# Patient Record
Sex: FEMALE | Race: BLACK OR AFRICAN AMERICAN | Employment: UNEMPLOYED | URBAN - METROPOLITAN AREA
[De-identification: names, ages, dates, MRNs, and addresses within clinical notes are randomized per-mention and may not be internally consistent; named-entity substitution may affect disease eponyms.]

---

## 2017-10-29 ENCOUNTER — HOSPITAL ENCOUNTER (EMERGENCY)
Facility: CLINIC | Age: 37
Discharge: HOME | End: 2017-10-29
Attending: EMERGENCY MEDICINE | Admitting: EMERGENCY MEDICINE
Payer: COMMERCIAL

## 2017-10-29 VITALS
OXYGEN SATURATION: 99 % | RESPIRATION RATE: 16 BRPM | SYSTOLIC BLOOD PRESSURE: 119 MMHG | TEMPERATURE: 97.6 F | DIASTOLIC BLOOD PRESSURE: 81 MMHG

## 2017-10-29 DIAGNOSIS — B34.9 ACUTE VIRAL SYNDROME: ICD-10-CM

## 2017-10-29 DIAGNOSIS — R10.9 ABDOMINAL CRAMPING: ICD-10-CM

## 2017-10-29 DIAGNOSIS — R11.0 NAUSEA: ICD-10-CM

## 2017-10-29 LAB
ALBUMIN UR-MCNC: NEGATIVE MG/DL
ANION GAP SERPL CALCULATED.3IONS-SCNC: 6 MMOL/L (ref 3–14)
APPEARANCE UR: CLEAR
BACTERIA #/AREA URNS HPF: ABNORMAL /HPF
BASOPHILS # BLD AUTO: 0 10E9/L (ref 0–0.2)
BASOPHILS NFR BLD AUTO: 0.3 %
BILIRUB UR QL STRIP: NEGATIVE
BUN SERPL-MCNC: 6 MG/DL (ref 7–30)
CALCIUM SERPL-MCNC: 8.6 MG/DL (ref 8.5–10.1)
CHLORIDE SERPL-SCNC: 107 MMOL/L (ref 94–109)
CO2 SERPL-SCNC: 26 MMOL/L (ref 20–32)
COLOR UR AUTO: YELLOW
CREAT SERPL-MCNC: 0.69 MG/DL (ref 0.52–1.04)
DEPRECATED S PYO AG THROAT QL EIA: NORMAL
DIFFERENTIAL METHOD BLD: NORMAL
EOSINOPHIL # BLD AUTO: 0.1 10E9/L (ref 0–0.7)
EOSINOPHIL NFR BLD AUTO: 1 %
ERYTHROCYTE [DISTWIDTH] IN BLOOD BY AUTOMATED COUNT: 13.2 % (ref 10–15)
GFR SERPL CREATININE-BSD FRML MDRD: >90 ML/MIN/1.7M2
GLUCOSE SERPL-MCNC: 91 MG/DL (ref 70–99)
GLUCOSE UR STRIP-MCNC: NEGATIVE MG/DL
HCG UR QL: NEGATIVE
HCT VFR BLD AUTO: 37.3 % (ref 35–47)
HGB BLD-MCNC: 12.1 G/DL (ref 11.7–15.7)
HGB UR QL STRIP: NEGATIVE
IMM GRANULOCYTES # BLD: 0 10E9/L (ref 0–0.4)
IMM GRANULOCYTES NFR BLD: 0.3 %
KETONES UR STRIP-MCNC: NEGATIVE MG/DL
LEUKOCYTE ESTERASE UR QL STRIP: NEGATIVE
LYMPHOCYTES # BLD AUTO: 2.2 10E9/L (ref 0.8–5.3)
LYMPHOCYTES NFR BLD AUTO: 32.1 %
MCH RBC QN AUTO: 28.6 PG (ref 26.5–33)
MCHC RBC AUTO-ENTMCNC: 32.4 G/DL (ref 31.5–36.5)
MCV RBC AUTO: 88 FL (ref 78–100)
MONOCYTES # BLD AUTO: 0.6 10E9/L (ref 0–1.3)
MONOCYTES NFR BLD AUTO: 8.2 %
MUCOUS THREADS #/AREA URNS LPF: PRESENT /LPF
NEUTROPHILS # BLD AUTO: 4 10E9/L (ref 1.6–8.3)
NEUTROPHILS NFR BLD AUTO: 58.1 %
NITRATE UR QL: NEGATIVE
NRBC # BLD AUTO: 0 10*3/UL
NRBC BLD AUTO-RTO: 0 /100
PH UR STRIP: 6 PH (ref 5–7)
PLATELET # BLD AUTO: 442 10E9/L (ref 150–450)
POTASSIUM SERPL-SCNC: 3.7 MMOL/L (ref 3.4–5.3)
RBC # BLD AUTO: 4.23 10E12/L (ref 3.8–5.2)
RBC #/AREA URNS AUTO: 1 /HPF (ref 0–2)
SODIUM SERPL-SCNC: 139 MMOL/L (ref 133–144)
SOURCE: ABNORMAL
SP GR UR STRIP: 1.01 (ref 1–1.03)
SPECIMEN SOURCE: NORMAL
SQUAMOUS #/AREA URNS AUTO: 1 /HPF (ref 0–1)
UROBILINOGEN UR STRIP-MCNC: 0 MG/DL (ref 0–2)
WBC # BLD AUTO: 6.9 10E9/L (ref 4–11)
WBC #/AREA URNS AUTO: <1 /HPF (ref 0–2)

## 2017-10-29 PROCEDURE — 87880 STREP A ASSAY W/OPTIC: CPT | Performed by: EMERGENCY MEDICINE

## 2017-10-29 PROCEDURE — 81025 URINE PREGNANCY TEST: CPT | Performed by: EMERGENCY MEDICINE

## 2017-10-29 PROCEDURE — 80048 BASIC METABOLIC PNL TOTAL CA: CPT | Performed by: EMERGENCY MEDICINE

## 2017-10-29 PROCEDURE — 81001 URINALYSIS AUTO W/SCOPE: CPT | Performed by: EMERGENCY MEDICINE

## 2017-10-29 PROCEDURE — 87081 CULTURE SCREEN ONLY: CPT | Performed by: EMERGENCY MEDICINE

## 2017-10-29 PROCEDURE — 85025 COMPLETE CBC W/AUTO DIFF WBC: CPT | Performed by: EMERGENCY MEDICINE

## 2017-10-29 PROCEDURE — 99283 EMERGENCY DEPT VISIT LOW MDM: CPT

## 2017-10-29 PROCEDURE — 25000132 ZZH RX MED GY IP 250 OP 250 PS 637: Performed by: EMERGENCY MEDICINE

## 2017-10-29 RX ORDER — ACETAMINOPHEN 500 MG
1000 TABLET ORAL ONCE
Status: COMPLETED | OUTPATIENT
Start: 2017-10-29 | End: 2017-10-29

## 2017-10-29 RX ORDER — ONDANSETRON 4 MG/1
4 TABLET, ORALLY DISINTEGRATING ORAL EVERY 8 HOURS PRN
Qty: 10 TABLET | Refills: 0 | Status: SHIPPED | OUTPATIENT
Start: 2017-10-29 | End: 2017-11-01

## 2017-10-29 RX ADMIN — ACETAMINOPHEN 1000 MG: 500 TABLET, FILM COATED ORAL at 11:59

## 2017-10-29 ASSESSMENT — ENCOUNTER SYMPTOMS
DYSURIA: 0
SORE THROAT: 1
CHILLS: 0
COUGH: 1
DIARRHEA: 0
HEMATURIA: 0
FREQUENCY: 0
ABDOMINAL PAIN: 1
FATIGUE: 0

## 2017-10-29 NOTE — DISCHARGE INSTRUCTIONS
Discharge Instructions  Abdominal Pain    Abdominal pain (belly pain) can be caused by many things. Your evaluation today does not show the exact cause for your pain. Your provider today has decided that it is unlikely your pain is due to a life threatening problem, or a problem requiring surgery or hospital admission. Sometimes those problems cannot be found right away, so it is very important that you follow up as directed.  Sometimes only the changes which occur over time allow the cause of your pain to be found.    Generally, every Emergency Department visit should have a follow-up clinic visit with either a primary or a specialty clinic/provider. Please follow-up as instructed by your emergency provider today. With abdominal pain, we often recommend very close follow-up, such as the following day.    ADULTS:  Return to the Emergency Department right away if:      You get an oral temperature above 102oF or as directed by your provider.    You have blood in your stools. This may be bright red or appear as black, tarry stools.      You keep vomiting (throwing up) or cannot drink liquids.    You see blood when you vomit.     You cannot have a bowel movement or you cannot pass gas.    Your stomach gets bloated or bigger.    Your skin or the whites of your eyes look yellow.    You faint.    You have bloody, frequent or painful urination (peeing).    You have new symptoms or anything that worries you.    CHILDREN:  Return to the Emergency Department right away if your child has any of the above-listed symptoms or the following:      Pushes your hand away or screams/cries when his/her belly is touched.    You notice your child is very fussy or weak.    Your child is very tired and is too tired to eat or drink.    Your child is dehydrated.  Signs of dehydration can be:  o Significant change in the amount of wet diapers/urine.  o Your infant or child starts to have dry mouth and lips, or no saliva (spit) or  tears.    PREGNANT WOMEN:  Return to the Emergency Department right away if you have any of the above-listed symptoms or the following:      You have bleeding, leaking fluid or passing tissue from the vagina.    You have worse pain or cramping, or pain in your shoulder or back.    You have vomiting that will not stop.    You have a temperature of 100oF or more.    Your baby is not moving as much as usual.    You faint.    You get a bad headache with or without eye problems and abdominal pain.    You have a seizure.    You have unusual discharge from your vagina and abdominal pain.    Abdominal pain is pretty common during pregnancy.  Your pain may or may not be related to your pregnancy. You should follow-up closely with your OB provider so they can evaluate you and your baby.  Until you follow-up with your regular provider, do the following:       Avoid sex and do not put anything in your vagina.    Drink clear fluids.    Only take medications approved by your provider.    MORE INFORMATION:    Appendicitis:  A possible cause of abdominal pain in any person who still has their appendix is acute appendicitis. Appendicitis is often hard to diagnose.  Testing does not always rule out early appendicitis or other causes of abdominal pain. Close follow-up with your provider and re-evaluations may be needed to figure out the reason for your abdominal pain.    Follow-up:  It is very important that you make an appointment with your clinic and go to the appointment.  If you do not follow-up with your primary provider, it may result in missing an important development which could result in permanent injury or disability and/or lasting pain.  If there is any problem keeping your appointment, call your provider or return to the Emergency Department.    Medications:  Take your medications as directed by your provider today.  Before using over-the-counter medications, ask your provider and make sure to take the medications as  "directed.  If you have any questions about medications, ask your provider.    Diet:  Resume your normal diet as much as possible, but do not eat fried, fatty or spicy foods while you have pain.  Do not drink alcohol or have caffeine.  Do not smoke tobacco.    Probiotics: If you have been given an antibiotic, you may want to also take a probiotic pill or eat yogurt with live cultures. Probiotics have \"good bacteria\" to help your intestines stay healthy. Studies have shown that probiotics help prevent diarrhea (loose stools) and other intestine problems (including C. diff infection) when you take antibiotics. You can buy these without a prescription in the pharmacy section of the store.     If you were given a prescription for medicine here today, be sure to read all of the information (including the package insert) that comes with your prescription.  This will include important information about the medicine, its side effects, and any warnings that you need to know about.  The pharmacist who fills the prescription can provide more information and answer questions you may have about the medicine.  If you have questions or concerns that the pharmacist cannot address, please call or return to the Emergency Department.       Remember that you can always come back to the Emergency Department if you are not able to see your regular provider in the amount of time listed above, if you get any new symptoms, or if there is anything that worries you.    Discharge Instructions  Sore Throat  You were seen today for a sore throat.  Most (>80%) sore throats are caused by a virus. Antibiotics do not help with viral infections, but you can fight off the virus on your own.  In this case, your sore throat would be treated with medications for your pain and fever.    Strep throat is a kind of sore throat caused by Group A streptococcus bacteria.  This type of sore throat is treated with antibiotics.  If you had a rapid test done today " for strep throat and it did not show infection, we may do a culture. If the culture shows you have strep throat, we will call you and get you a prescription for antibiotics.  Generally, every Emergency Department visit should have a follow-up clinic visit with either a primary or a specialty clinic/provider. Please follow-up as instructed by your emergency provider today.  Return to the Emergency Department if:    If you have difficulty breathing.    If you are drooling because you are unable to swallow.    You become dehydrated due to difficulty drinking. Signs of dehydration include weakness, dry mouth, and urinating less than 3 times per day.    If you develop swelling of the neck or tongue.    If you develop a high fever with either severe or unusual headache or stiff neck.    Treatment:      Pain relief -- Non-prescription pain medications, such as Tylenol  (acetaminophen) or Motrin , Advil  (ibuprofen) are usually recommended for pain.  Do not use a medicine that you are allergic to, or if your provider has told you not to use it.    Soft or liquid diet. Concentrate on liquids to keep yourself hydrated. Cold liquids (popsicles, ice cream, etc.) may feel good on your throat.  If you were given a prescription for medicine here today, be sure to read all of the information (including the package insert) that comes with your prescription.  This will include important information about the medicine, its side effects, and any warnings that you need to know about.  The pharmacist who fills the prescription can provide more information and answer questions you may have about the medicine.  If you have questions or concerns that the pharmacist cannot address, please call or return to the Emergency Department.   Remember that you can always come back to the Emergency Department if you are not able to see your regular provider in the amount of time listed above, if you get any new symptoms, or if there is anything that  worries you.

## 2017-10-29 NOTE — ED AVS SNAPSHOT
[facility]    [address] [address]    Phone:  [phone]    Fax:  [phone]                                       Simran Fish   MRN: [id number]    Department:  [facility]   Date of Visit:  10/29/2017           Patient Information     Date Of Birth          1980        Your diagnoses for this visit were:     Acute viral syndrome     Abdominal cramping     Nausea        You were seen by Kaylee Young MD.      Follow-up Information     Follow up with [facility], [facility].    Specialty:  Internal Medicine    Why:  3-5 days as needed    Contact information:    [address] [place] [address]  [phone]          Follow up with [facility].    Specialty:  EMERGENCY MEDICINE    Why:  As needed, If symptoms worsen    Contact information:    [address] [place] [address]  [phone]        Discharge Instructions       Discharge Instructions  Abdominal Pain    Abdominal pain (belly pain) can be caused by many things. Your evaluation today does not show the exact cause for your pain. Your provider today has decided that it is unlikely your pain is due to a life threatening problem, or a problem requiring surgery or hospital admission. Sometimes those problems cannot be found right away, so it is very important that you follow up as directed.  Sometimes only the changes which occur over time allow the cause of your pain to be found.    Generally, every Emergency Department visit should have a follow-up clinic visit with either a primary or a specialty clinic/provider. Please follow-up as instructed by your emergency provider today. With abdominal pain, we often recommend very close follow-up, such as the following day.    ADULTS:  Return to the Emergency Department right away if:      You get an oral temperature above 102oF or as  directed by your provider.    You have blood in your stools. This may be bright red or appear as black, tarry stools.      You keep vomiting (throwing up) or cannot drink liquids.    You see blood when you vomit.     You cannot have a bowel movement or you cannot pass gas.    Your stomach gets bloated or bigger.    Your skin or the whites of your eyes look yellow.    You faint.    You have bloody, frequent or painful urination (peeing).    You have new symptoms or anything that worries you.    CHILDREN:  Return to the Emergency Department right away if your child has any of the above-listed symptoms or the following:      Pushes your hand away or screams/cries when his/her belly is touched.    You notice your child is very fussy or weak.    Your child is very tired and is too tired to eat or drink.    Your child is dehydrated.  Signs of dehydration can be:  o Significant change in the amount of wet diapers/urine.  o Your infant or child starts to have dry mouth and lips, or no saliva (spit) or tears.    PREGNANT WOMEN:  Return to the Emergency Department right away if you have any of the above-listed symptoms or the following:      You have bleeding, leaking fluid or passing tissue from the vagina.    You have worse pain or cramping, or pain in your shoulder or back.    You have vomiting that will not stop.    You have a temperature of 100oF or more.    Your baby is not moving as much as usual.    You faint.    You get a bad headache with or without eye problems and abdominal pain.    You have a seizure.    You have unusual discharge from your vagina and abdominal pain.    Abdominal pain is pretty common during pregnancy.  Your pain may or may not be related to your pregnancy. You should follow-up closely with your OB provider so they can evaluate you and your baby.  Until you follow-up with your regular provider, do the following:       Avoid sex and do not put anything in your vagina.    Drink clear  "fluids.    Only take medications approved by your provider.    MORE INFORMATION:    Appendicitis:  A possible cause of abdominal pain in any person who still has their appendix is acute appendicitis. Appendicitis is often hard to diagnose.  Testing does not always rule out early appendicitis or other causes of abdominal pain. Close follow-up with your provider and re-evaluations may be needed to figure out the reason for your abdominal pain.    Follow-up:  It is very important that you make an appointment with your clinic and go to the appointment.  If you do not follow-up with your primary provider, it may result in missing an important development which could result in permanent injury or disability and/or lasting pain.  If there is any problem keeping your appointment, call your provider or return to the Emergency Department.    Medications:  Take your medications as directed by your provider today.  Before using over-the-counter medications, ask your provider and make sure to take the medications as directed.  If you have any questions about medications, ask your provider.    Diet:  Resume your normal diet as much as possible, but do not eat fried, fatty or spicy foods while you have pain.  Do not drink alcohol or have caffeine.  Do not smoke tobacco.    Probiotics: If you have been given an antibiotic, you may want to also take a probiotic pill or eat yogurt with live cultures. Probiotics have \"good bacteria\" to help your intestines stay healthy. Studies have shown that probiotics help prevent diarrhea (loose stools) and other intestine problems (including C. diff infection) when you take antibiotics. You can buy these without a prescription in the pharmacy section of the store.     If you were given a prescription for medicine here today, be sure to read all of the information (including the package insert) that comes with your prescription.  This will include important information about the medicine, its side " effects, and any warnings that you need to know about.  The pharmacist who fills the prescription can provide more information and answer questions you may have about the medicine.  If you have questions or concerns that the pharmacist cannot address, please call or return to the Emergency Department.       Remember that you can always come back to the Emergency Department if you are not able to see your regular provider in the amount of time listed above, if you get any new symptoms, or if there is anything that worries you.    Discharge Instructions  Sore Throat  You were seen today for a sore throat.  Most (>80%) sore throats are caused by a virus. Antibiotics do not help with viral infections, but you can fight off the virus on your own.  In this case, your sore throat would be treated with medications for your pain and fever.    Strep throat is a kind of sore throat caused by Group A streptococcus bacteria.  This type of sore throat is treated with antibiotics.  If you had a rapid test done today for strep throat and it did not show infection, we may do a culture. If the culture shows you have strep throat, we will call you and get you a prescription for antibiotics.  Generally, every Emergency Department visit should have a follow-up clinic visit with either a primary or a specialty clinic/provider. Please follow-up as instructed by your emergency provider today.  Return to the Emergency Department if:    If you have difficulty breathing.    If you are drooling because you are unable to swallow.    You become dehydrated due to difficulty drinking. Signs of dehydration include weakness, dry mouth, and urinating less than 3 times per day.    If you develop swelling of the neck or tongue.    If you develop a high fever with either severe or unusual headache or stiff neck.    Treatment:      Pain relief -- Non-prescription pain medications, such as Tylenol  (acetaminophen) or Motrin , Advil  (ibuprofen) are  usually recommended for pain.  Do not use a medicine that you are allergic to, or if your provider has told you not to use it.    Soft or liquid diet. Concentrate on liquids to keep yourself hydrated. Cold liquids (popsicles, ice cream, etc.) may feel good on your throat.  If you were given a prescription for medicine here today, be sure to read all of the information (including the package insert) that comes with your prescription.  This will include important information about the medicine, its side effects, and any warnings that you need to know about.  The pharmacist who fills the prescription can provide more information and answer questions you may have about the medicine.  If you have questions or concerns that the pharmacist cannot address, please call or return to the Emergency Department.   Remember that you can always come back to the Emergency Department if you are not able to see your regular provider in the amount of time listed above, if you get any new symptoms, or if there is anything that worries you.      24 Hour Appointment Hotline       To make an appointment at any [facility], call [phone] ([phone]). If you don't have a family doctor or clinic, we will help you find one. [facility] clinics are conveniently located to serve the needs of you and your family.             Review of your medicines      START taking        Dose / Directions Last dose taken    ondansetron 4 MG ODT tab   Commonly known as:  ZOFRAN ODT   Dose:  4 mg   Quantity:  10 tablet        Take 1 tablet (4 mg) by mouth every 8 hours as needed for nausea   Refills:  0          Our records show that you are taking the medicines listed below. If these are incorrect, please call your family doctor or clinic.        Dose / Directions Last dose taken    albuterol 108 (90 BASE) MCG/ACT Inhaler   Commonly known as:  PROAIR HFA   Dose:  1-2 puff   Quantity:  1 Inhaler        Inhale 1-2 puffs into the lungs every 4 hours as  needed for shortness of breath / dyspnea   Refills:  11        budesonide-formoterol 160-4.5 MCG/ACT Inhaler   Commonly known as:  SYMBICORT   Dose:  2 puff   Quantity:  1 Inhaler        Inhale 2 puffs into the lungs 2 times daily   Refills:  8        EXCEDRIN PO        Take by mouth as needed   Refills:  0        SUMAtriptan 100 MG tablet   Commonly known as:  IMITREX   Dose:  100 mg   Quantity:  9 tablet        Take 1 tablet (100 mg) by mouth at onset of headache for migraine May repeat in 2 hours if needed: max 2/day; average number of headaches monthly 2   Refills:  1                Prescriptions were sent or printed at these locations (1 Prescription)                   Other Prescriptions                Printed at Department/Unit printer (1 of 1)         ondansetron (ZOFRAN ODT) 4 MG ODT tab                Procedures and tests performed during your visit     Basic metabolic panel    Beta strep group A culture    CBC with platelets differential    HCG qualitative urine    Peripheral IV: Standard    Rapid strep screen    UA with Microscopic      Orders Needing Specimen Collection     None      Pending Results     Date and Time Order Name Status Description    10/29/2017 1141 Beta strep group A culture In process             Pending Culture Results     Date and Time Order Name Status Description    10/29/2017 1141 Beta strep group A culture In process             Pending Results Instructions     If you had any lab results that were not finalized at the time of your Discharge, you can call the ED Lab Result RN at [phone]. You will be contacted by this team for any positive Lab results or changes in treatment. The nurses are available 7 days a week from 10A to 6:30P.  You can leave a message 24 hours per day and they will return your call.        Test Results From Your Hospital Stay        10/29/2017 12:30 PM      Component Results     Component Value Ref Range & Units Status    Color Urine Yellow  Final     Appearance Urine Clear  Final    Glucose Urine Negative NEG^Negative mg/dL Final    Bilirubin Urine Negative NEG^Negative Final    Ketones Urine Negative NEG^Negative mg/dL Final    Specific Gravity Urine 1.015 1.003 - 1.035 Final    Blood Urine Negative NEG^Negative Final    pH Urine 6.0 5.0 - 7.0 pH Final    Protein Albumin Urine Negative NEG^Negative mg/dL Final    Urobilinogen mg/dL 0.0 0.0 - 2.0 mg/dL Final    Nitrite Urine Negative NEG^Negative Final    Leukocyte Esterase Urine Negative NEG^Negative Final    Source Midstream Urine  Final    WBC Urine <1 0 - 2 /HPF Final    RBC Urine 1 0 - 2 /HPF Final    Bacteria Urine Few (A) NEG^Negative /HPF Final    Squamous Epithelial /HPF Urine 1 0 - 1 /HPF Final    Mucous Urine Present (A) NEG^Negative /LPF Final         10/29/2017 12:28 PM      Component Results     Component Value Ref Range & Units Status    HCG Qual Urine Negative NEG^Negative Final    This test is for screening purposes.  Results should be interpreted along with   the clinical picture.  Confirmation testing is available if warranted by   ordering [id number], HCG Quantitative Pregnancy.           10/29/2017 12:21 PM      Component Results     Component    Specimen Description    Throat    Rapid Strep A Screen    NEGATIVE: No Group A streptococcal antigen detected by immunoassay, await culture report.         10/29/2017 12:12 PM      Component Results     Component Value Ref Range & Units Status    WBC 6.9 4.0 - 11.0 10e9/L Final    RBC Count 4.23 3.8 - 5.2 10e12/L Final    Hemoglobin 12.1 11.7 - 15.7 g/dL Final    Hematocrit 37.3 35.0 - 47.0 % Final    MCV 88 78 - 100 fl Final    MCH 28.6 26.5 - 33.0 pg Final    MCHC 32.4 31.5 - 36.5 g/dL Final    RDW 13.2 10.0 - 15.0 % Final    Platelet Count 442 150 - 450 10e9/L Final    Diff Method Automated Method  Final    % Neutrophils 58.1 % Final    % Lymphocytes 32.1 % Final    % Monocytes 8.2 % Final    % Eosinophils 1.0 % Final    % Basophils 0.3 % Final    %  Immature Granulocytes 0.3 % Final    Nucleated RBCs 0 0 /100 Final    Absolute Neutrophil 4.0 1.6 - 8.3 10e9/L Final    Absolute Lymphocytes 2.2 0.8 - 5.3 10e9/L Final    Absolute Monocytes 0.6 0.0 - 1.3 10e9/L Final    Absolute Eosinophils 0.1 0.0 - 0.7 10e9/L Final    Absolute Basophils 0.0 0.0 - 0.2 10e9/L Final    Abs Immature Granulocytes 0.0 0 - 0.4 10e9/L Final    Absolute Nucleated RBC 0.0  Final         10/29/2017 12:29 PM      Component Results     Component Value Ref Range & Units Status    Sodium 139 133 - 144 mmol/L Final    Potassium 3.7 3.4 - 5.3 mmol/L Final    Chloride 107 94 - 109 mmol/L Final    Carbon Dioxide 26 20 - 32 mmol/L Final    Anion Gap 6 3 - 14 mmol/L Final    Glucose 91 70 - 99 mg/dL Final    Urea Nitrogen 6 (L) 7 - 30 mg/dL Final    Creatinine 0.69 0.52 - 1.04 mg/dL Final    GFR Estimate >90 >60 mL/min/1.7m2 Final    Non  GFR Calc    GFR Estimate If Black >90 >60 mL/min/1.7m2 Final    African American GFR Calc    Calcium 8.6 8.5 - 10.1 mg/dL Final         10/29/2017 12:22 PM                Clinical Quality Measure: Blood Pressure Screening     Your blood pressure was checked while you were in the emergency department today. The last reading we obtained was  BP: (!) 138/95 . Please read the guidelines below about what these numbers mean and what you should do about them.  If your systolic blood pressure (the top number) is less than 120 and your diastolic blood pressure (the bottom number) is less than 80, then your blood pressure is normal. There is nothing more that you need to do about it.  If your systolic blood pressure (the top number) is 120-139 or your diastolic blood pressure (the bottom number) is 80-89, your blood pressure may be higher than it should be. You should have your blood pressure rechecked within a year by a primary care provider.  If your systolic blood pressure (the top number) is 140 or greater or your diastolic blood pressure (the bottom  "number) is 90 or greater, you may have high blood pressure. High blood pressure is treatable, but if left untreated over time it can put you at risk for heart attack, stroke, or kidney failure. You should have your blood pressure rechecked by a primary care provider within the next 4 weeks.  If your provider in the emergency department today gave you specific instructions to follow-up with your doctor or provider even sooner than that, you should follow that instruction and not wait for up to 4 weeks for your follow-up visit.        Thank you for choosing [facility]       Thank you for choosing [facility] for your care. Our goal is always to provide you with excellent care. Hearing back from our patients is one way we can continue to improve our services. Please take a few minutes to complete the written survey that you may receive in the mail after you visit with us. Thank you!        [employer] Information     [employer] lets you send messages to your doctor, view your test results, renew your prescriptions, schedule appointments and more. To sign up, go to [email or web address] . Click on \"Log in\" on the left side of the screen, which will take you to the Welcome page. Then click on \"Sign up Now\" on the right side of the page.     You will be asked to enter the access code listed below, as well as some personal information. Please follow the directions to create your username and password.     Your access code is: [email or web address]  Expires: 2018 12:48 PM     Your access code will  in 90 days. If you need help or a new code, please call your [facility] clinic or [phone].        Care EveryWhere ID     This is your Care EveryWhere ID. This could be used by other organizations to access your [facility] medical records  [id number]        Equal Access to Services     ELENA ACEVEDO : courtney Hernández qaybta kaalmada adeegyada, waxay idiin hayaan adeeg kharash la'aan ah. So juan m " [phone].    ATENCIÓN: Si habla español, tiene a quintanilla disposición servicios gratuitos de asistencia lingüística. Llame al [phone].    We comply with applicable federal civil rights laws and [place] laws. We do not discriminate on the basis of race, color, national origin, age, disability, sex, sexual orientation, or gender identity.            After Visit Summary       This is your record. Keep this with you and show to your community pharmacist(s) and doctor(s) at your next visit.

## 2017-10-29 NOTE — ED PROVIDER NOTES
History     Chief Complaint:  Pharyngitis and Abdominal Pain        HPI   Simran Fish is a 37 year old female who presents with pharyngitis and abdominal pain. The patient states that for the last week she has been experiencing intermittent lower abdominal pain which waxes and wanes in severity. She states that it feels similar to period cramps, however, she had normal period on 10/13. She also complains of a sore throat which has present for the last two days and accompanied by congestion and a cough. Patient denies fever, chills, diarrhea, ear pain, urinary symptoms, or known ill contacts. Patient denies all other complaints.     Allergies:  Asa [Aspirin]  Codeine      Medications:    Symbicort  Albuterol Inhaler  Imitrex    Past Medical History:    Abnormal Pap Smear (ASCUS)  Asthma  Heart Murmur  HSV 2 Infection  Migraines  Umbilical Hernia  Adjustment Disorder with Depressed Mood    Past Surgical History:    Marion Teeth   Herniorrhaphy Umbilical    Family History:    Diabetes  Alcohol/Drug     Social History:  Presents alone   Tobacco use: never  Alcohol use: No  PCP: [facility]    Marital Status:  Single      Review of Systems   Constitutional: Negative for chills and fatigue.   HENT: Positive for congestion and sore throat. Negative for ear pain.    Respiratory: Positive for cough.    Gastrointestinal: Positive for abdominal pain. Negative for diarrhea.   Genitourinary: Negative for dysuria, frequency, hematuria and urgency.   All other systems reviewed and are negative.    Physical Exam     Patient Vitals for the past 24 hrs:   BP Temp Temp src Heart Rate Resp SpO2   10/29/17 1253 - - - - - 99 %   10/29/17 1252 119/81 - - - - -   10/29/17 1129 (!) 138/95 97.6  F (36.4  C) Temporal 95 16 99 %      Physical Exam  General: Adult female sitting upright.  Eyes: PERRL, Conjunctive within normal limits  ENT: Moist mucous membranes, oropharynx clear.   CV: Normal S1S2, no murmur, rub or gallop.  Regular rate and rhythm  Resp: Clear to auscultation bilaterally, no wheezes, rales or rhonchi. Normal respiratory effort.  GI: Abdomen is soft, nontender and nondistended. No palpable masses. No rebound or guarding. No CVA tenderness to percussion.  MSK: No edema. Nontender. Normal active range of motion.  Skin: Warm and dry. No rashes or lesions or ecchymoses on visible skin.  Neuro: Alert and oriented. Responds appropriately to all questions and commands. No focal findings appreciated. Normal muscle tone.  Psych: Normal mood and affect. Pleasant.     Emergency Department Course   Laboratory:  CBC: WNL (WBC 6.9, HGB 12.1, )    BMP: BUN 6 (L) o/w  WNL (Creatinine 0.69)   Rapid Strep Screen: Negative   Beta Strep Culture: Pending    UA with micro: Bacteria Few (A), Mucous Present (A) o/w negative   HCG: Negative     Interventions:  1159: Tylenol 1,000 mg PO    Emergency Department Course:  Past medical records, nursing notes, and vitals reviewed.  1139: I performed an exam of the patient and obtained history, as documented above.  IV inserted and blood drawn.   Above interventions provided.   Patient reassessed. Denies any new concerns. Appears comfortable.  I personally reviewed the laboratory results with the Patient and answered all related questions prior to discharge.  1246: I rechecked the patient. Findings and plan explained to the Patient. Patient discharged home with instructions regarding supportive care, medications, and reasons to return. The importance of close follow-up was reviewed.        Impression & Plan    Medical Decision Making:  Simran Fish is a 37 year old female who presents to the ED for concerns of intermittent abdominal cramping for the last week with occasional nausea and vomiting as well as sore throat for two days. It is unlikely that these symptoms are linked, though I suppose they could be part of a viral syndrome. She has no associated diarrhea. She has no abdominal  tenderness making acute surgical pathology such as appendicitis, ovarian torsion, etc unlikely. She has not had any vaginal bleeding or ongoing vaginal discharge to suggest PID or other vaginal pathology. Urine pregnancy is negative and ectopic is not likely . Given lack of tenderness on examination, it seems unlikely that she would benefit from CT imaging, this could in fact cause her more harm than good due to the radiation. The throat was mildly erythematous and rapid strep was obtained, this was negative. I suspect viral process. She is recommended to follow up in 3-5 with ongoing symptoms for reassessment. Return to the ED with worsening symptoms. Supportive outpatient care seem reasonable. Ibuprofen/Tylenol as needed. All questions were answered prior to discharge.     Diagnosis:    ICD-10-CM   1. Acute viral syndrome B34.9   2. Abdominal cramping R10.9   3. Nausea R11.0       Disposition:  Discharged to home with plan as outlined.     Discharge Medications:  Discharge Medication List as of 10/29/2017 12:48 PM      START taking these medications    Details   ondansetron (ZOFRAN ODT) 4 MG ODT tab Take 1 tablet (4 mg) by mouth every 8 hours as needed for nausea, Disp-10 tablet, R-0, Local Print             10/29/2017   [facility] EMERGENCY DEPARTMENT  I, Griselda Trevino, juan serving as a scribe at 11:39 AM on 10/29/2017 to document services personally performed by Kaylee Young MD based on my observations and the provider's statements to me.         Kaylee Young MD  10/30/17 0936

## 2017-10-29 NOTE — ED AVS SNAPSHOT
[facility] Emergency Department    [address]    [facility] [address]    Phone:  [phone]    Fax:  [phone]                                       Simran Fish   MRN: [id number]    Department:  [facility] Emergency Department   Date of Visit:  10/29/2017           After Visit Summary Signature Page     I have received my discharge instructions, and my questions have been answered. I have discussed any challenges I see with this plan with the nurse or doctor.    ..........................................................................................................................................  Patient/Patient Representative Signature      ..........................................................................................................................................  Patient Representative Print Name and Relationship to Patient    ..................................................               ................................................  Date                                            Time    ..........................................................................................................................................  Reviewed by Signature/Title    ...................................................              ..............................................  Date                                                            Time

## 2017-10-31 LAB
BACTERIA SPEC CULT: NORMAL
Lab: NORMAL
SPECIMEN SOURCE: NORMAL

## 2019-04-26 ENCOUNTER — HOSPITAL ENCOUNTER (EMERGENCY)
Facility: CLINIC | Age: 39
Discharge: HOME | End: 2019-04-26
Attending: EMERGENCY MEDICINE | Admitting: EMERGENCY MEDICINE
Payer: MEDICAID

## 2019-04-26 VITALS
HEART RATE: 93 BPM | WEIGHT: 200 LBS | SYSTOLIC BLOOD PRESSURE: 128 MMHG | RESPIRATION RATE: 16 BRPM | BODY MASS INDEX: 34.33 KG/M2 | OXYGEN SATURATION: 99 % | DIASTOLIC BLOOD PRESSURE: 73 MMHG | TEMPERATURE: 98.4 F

## 2019-04-26 DIAGNOSIS — K08.89 PAIN, DENTAL: ICD-10-CM

## 2019-04-26 PROCEDURE — 99283 EMERGENCY DEPT VISIT LOW MDM: CPT

## 2019-04-26 RX ORDER — OXYCODONE HYDROCHLORIDE 5 MG/1
5 TABLET ORAL EVERY 6 HOURS PRN
Qty: 10 TABLET | Refills: 0 | Status: ON HOLD | OUTPATIENT
Start: 2019-04-26 | End: 2019-06-01

## 2019-04-26 RX ORDER — IBUPROFEN 600 MG/1
600 TABLET, FILM COATED ORAL ONCE
Status: DISCONTINUED | OUTPATIENT
Start: 2019-04-26 | End: 2019-04-26

## 2019-04-26 ASSESSMENT — ENCOUNTER SYMPTOMS: FEVER: 0

## 2019-04-26 NOTE — ED AVS SNAPSHOT
[facility] Emergency Department  [address]  [facility] [address]  Phone:  [phone]  Fax:  [phone]                                    Simran Fish   MRN: [id number]    Department:  [facility] Emergency Department   Date of Visit:  4/26/2019           After Visit Summary Signature Page    I have received my discharge instructions, and my questions have been answered. I have discussed any challenges I see with this plan with the nurse or doctor.    ..........................................................................................................................................  Patient/Patient Representative Signature      ..........................................................................................................................................  Patient Representative Print Name and Relationship to Patient    ..................................................               ................................................  Date                                   Time    ..........................................................................................................................................  Reviewed by Signature/Title    ...................................................              ..............................................  Date                                               Time          22EPIC Rev 08/18

## 2019-04-27 NOTE — ED TRIAGE NOTES
Pt in with C/O L lower tooth pain. No swelling noted on exam. Pt reports she has been using oragel and tylenol to no improvement. Pt reports seen at  earlier today and placed on penicillin, and had a dental block. Pain is worse now

## 2019-04-27 NOTE — ED PROVIDER NOTES
History     Chief Complaint:  Dental Pain    HPI   Simran Fish is a 32-weeks pregnant 38 year old female who presents to the emergency department for evaluation of dental pain. The patient reports she today has developed lower left-sided tooth pain. She used Oragel and Tylenol at home with no improvement earlier today. She indicates she presented to  with these symptoms, given a dental block, and discharged with Penicillin. She states the pain after returning home has since worsened, prompting her presentation to the ED. The patient denies any fever.    Allergies:  Asa [Aspirin]  Codeine     Medications:    Albuterol  Excedrin  Symbicort     Past Medical History:    Asthma  Heart murmur  Migraines  HSV    Past Surgical History:    [place] teeth removal  Herniorrhaphy umbilical    Family History:    Diabetes  Alcohol/drug use    Social History:  Presents with daughter.  Never smoker.  Negative for alcohol use.  Marital Status:  Single [1]     Review of Systems   Constitutional: Negative for fever.   HENT: Positive for dental problem.    All other systems reviewed and are negative.    Physical Exam     Patient Vitals for the past 24 hrs:   BP Temp Temp src Pulse Resp SpO2 Weight   04/26/19 1954 128/73 98.4  F (36.9  C) Temporal 93 16 99 % 90.7 kg (200 lb)     Physical Exam  Constitutional: Vital signs reviewed as above.   HEENT:               Head: No external signs of trauma. No lesions noted.              Eyes: PEERL, EOMI B/L, no pain or limitation of superior gaze              Nose: Noncongested, no exudates. No rhinorrhea. No FB noted              Mouth/Throat:                           Mucous membranes are moist and normal.                           No Oropharyngeal exudate. No oropharyngeal erythema noted.                          No tonsilar swelling noted.                           No uvular deviation noted.                          No swelling noted on the floor of the mouth                           There is a notable dental decay on the 2nd from the rear left lower molar. No abscess noted.  Neck: FROM. Neck is supple  Cardiovascular: Normal rate, regular rhythm and normal heart sounds.  No murmur heard. Equal B/L peripheral pulses.  Pulmonary/Chest: Effort normal and breath sounds normal. No respiratory distress. Patient has no wheezes. Patient has no rales.   Gastrointestinal: Soft. There is no tenderness.   Musculoskeletal/Extremities: No edema noted. Normal tone.  Neurological: Patient is alert and oriented to person, place, and time.   Skin: Skin is warm and dry. There is no diaphoresis noted.   Psychiatric: The patient appears calm.    Emergency Department Course     Emergency Department Course:  Nursing notes and vitals reviewed. 2016 I performed an exam of the patient as documented above. I discussed the results of her workup thus far.     Findings and plan explained to the Patient. Patient discharged home with instructions regarding supportive care, medications, and reasons to return. The importance of close follow-up was reviewed. The patient was prescribed Roxicodone.    Impression & Plan      Medical Decision Making:  This 30-year-old female patient presents the ED due to left jaw pain.  Please see the HPI and exam for specifics.  Patient remained well in the ED.  I offered dental block but the patient states that when she got at urgent care did not seem to work.  She has been taking Tylenol today and notes that she has, over the course of today, taken 9 x 500 mg tablets for a total of 4500 mg today.  Ideally she should be less than 3000 mg in a 24-hour period and certainly less than 4000.  I have encouraged her to not take any more Tylenol today.  I will elect to prescribe a small course of oxycodone for her for home.  She notes that she will follow with her dentist on Monday.  Anticipatory guidance given prior to discharge.    Diagnosis:    ICD-10-CM   1. Pain, dental K08.89        Disposition:  discharged to home    Discharge Medications:     Medication List      Started    oxyCODONE 5 MG tablet  Commonly known as:  ROXICODONE  5 mg, Oral, EVERY 6 HOURS PRN          I, Papito Rodriguez, am serving as a scribe on 4/26/2019 at 8:14 PM to personally document services performed by Rickie Guillen DO based on my observations and the provider's statements to me.     Papito Rodriguez  4/26/2019   [facility] EMERGENCY DEPARTMENT       Rickie Guillen DO  04/26/19 2396

## 2019-06-01 ENCOUNTER — HOSPITAL ENCOUNTER (OUTPATIENT)
Facility: CLINIC | Age: 39
Discharge: HOME | End: 2019-06-02
Attending: OBSTETRICS & GYNECOLOGY | Admitting: OBSTETRICS & GYNECOLOGY
Payer: COMMERCIAL

## 2019-06-01 ENCOUNTER — APPOINTMENT (OUTPATIENT)
Dept: ULTRASOUND IMAGING | Facility: CLINIC | Age: 39
End: 2019-06-01
Attending: OBSTETRICS & GYNECOLOGY
Payer: COMMERCIAL

## 2019-06-01 VITALS — RESPIRATION RATE: 16 BRPM | TEMPERATURE: 98.4 F | SYSTOLIC BLOOD PRESSURE: 119 MMHG | DIASTOLIC BLOOD PRESSURE: 77 MMHG

## 2019-06-01 PROBLEM — Z36.89 ENCOUNTER FOR TRIAGE IN PREGNANT PATIENT: Status: ACTIVE | Noted: 2019-06-01

## 2019-06-01 PROCEDURE — 59025 FETAL NON-STRESS TEST: CPT | Mod: XU

## 2019-06-01 PROCEDURE — 99201 ZZC OFFICE/OUTPT VISIT, NEW, LEVEL I: CPT | Mod: 25 | Performed by: OBSTETRICS & GYNECOLOGY

## 2019-06-01 PROCEDURE — 59025 FETAL NON-STRESS TEST: CPT | Mod: 26 | Performed by: OBSTETRICS & GYNECOLOGY

## 2019-06-01 PROCEDURE — G0463 HOSPITAL OUTPT CLINIC VISIT: HCPCS | Mod: 25

## 2019-06-01 PROCEDURE — 76819 FETAL BIOPHYS PROFIL W/O NST: CPT

## 2019-06-02 PROCEDURE — G0463 HOSPITAL OUTPT CLINIC VISIT: HCPCS | Mod: 25

## 2019-06-02 NOTE — DISCHARGE INSTRUCTIONS
Discharge Instruction for Undelivered Patients      You were seen for: Fetal Assessment  We Consulted: Dr. Johnson  You had (Test or Medicine): Non stress stress, biophysical profile, uterine and fetal monitoring.     Diet:   Drink 8 to 12 glasses of liquids (milk, juice, water) every day.  You may eat meals and snacks.     Activity:  Count fetal kicks everyday (see handout)  Call your doctor or nurse midwife if your baby is moving less than usual.     Call your provider if you notice:  Swelling in your face or increased swelling in your hands or legs.  Headaches that are not relieved by Tylenol (acetaminophen).  Changes in your vision (blurring: seeing spots or stars.)  Nausea (sick to your stomach) and vomiting (throwing up).   Weight gain of 5 pounds or more per week.  Heartburn that doesn't go away.  Signs of bladder infection: pain when you urinate (use the toilet), need to go more often and more urgently.  The bag of santana (rupture of membranes) breaks, or you notice leaking in your underwear.  Bright red blood in your underwear.  Abdominal (lower belly) or stomach pain.  Second (plus) baby: Contractions (tightening) less than 10 minutes apart and getting stronger.      Follow-up:  Pt instructed to go immediately to [facility] for induction of labor.

## 2019-06-02 NOTE — PROVIDER NOTIFICATION
06/02/19 0128   Provider Notification   Provider Name/Title Dr. Johnson   Method of Notification At Bedside   Request Evaluate in Person   Notification Reason Other (Comment)  (Risks of leaving AMA)     MD at bedside to further discuss risks of leaving AMA. Pt states she understands the associated risks with leaving and states she does wish to proceed with signing AMA paperwork. Pt states she will drive immediately to [facility].

## 2019-06-02 NOTE — PLAN OF CARE
Data: Patient presented to [facility]: 2019  9:19 PM.  Reason for maternal/fetal assessment is decreased fetal movement. Patient reports decreased movement starting at 1900 this evening. Fetus became active again at approximately 2100.  Patient is a .  Prenatal record reviewed. Pregnancy  has been complicated by  has been complicated by mild cholestasis.  Gestational Age 37w2d. VSS. Fetal movement present. Patient denies uterine contractions, leaking of vaginal fluid/rupture of membranes, vaginal bleeding, abdominal pain, pelvic pressure, nausea, vomiting, headache, visual disturbances, epigastric or URQ pain. Support person is present.   Action: Verbal consent for EFM. Triage assessment completed. Bill of rights reviewed.  Response: Patient verbalized agreement with plan. Will contact Dr Theresa Johnson with update and further orders.

## 2019-06-02 NOTE — PROVIDER NOTIFICATION
06/02/19 0103   Provider Notification   Provider Name/Title Dr. Johnson   Method of Notification Phone   Request Evaluate - Remote   Notification Reason Status Update     RN updated Pt with MD recommendation to admit and induce due to BPP results and decreased fetal movement. Pt states she would strongly prefer to go to [facility] where she is scheduled for induction at 0700. Pt has a car and could drive herself there immediately. MD states that if Pt chooses to leave the hospital it would be AMA. Will discuss MD recommendations with Pt and update MD once Pt has decided where she will continue her care.

## 2019-06-02 NOTE — PLAN OF CARE
Data: Patient assessed in the [facility] for decreased fetal movement.  Cervical exam not examined.  Membranes intact.  Contractions occasional.  Action:  Presumed adequate fetal oxygenation documented (see flow record) however BPP results of 2/8. Discharge instructions reviewed. Patient is leaving AMA and is instructed to proceed immediately to [facility] for further evaluation.  Response: Patient verbalized understanding of education and risks of leaving AMA. Discharged at 0131.

## 2019-06-02 NOTE — PROGRESS NOTES
Obstetrics Triage Note    HPI:  Simran Fish is a 38 year old  female at 37w2d, pregnancy complicated by cholestasis of pregnancy, here with complaints of decreased fetal movement.    She has IOL scheduled tomorrow at [facility] with an arrival time of 0700.  Chart reviewed via Care Everywhere. Bile acids 2019 were 12. On 2019 bile acids were 2.    She states that other than whole body pruritis that is worse at night, she has otherwise been feeling well. She takes Benadryl PO with improvement. She denies contractions, leakage of fluid, back pain, SOB, abdominal pain, vaginal bleeding.     She is here with her three children.     ROS:  Negative except as mentioned in HPI.    PMH:  Past Medical History:   Diagnosis Date     Abnormal Pap smear      ASCUS on Pap smear 7/16/10,  10/19/11    neg HPV,  Pos HPV     Asthma      heart murmur      Heart murmur      hsv 2015     HSV-2 infection 2015     Migraines        PSHx:  Past Surgical History:   Procedure Laterality Date     C NONSPECIFIC PROCEDURE  3/08    wisdom teeth     HERNIORRHAPHY UMBILICAL  2014    Procedure: HERNIORRHAPHY UMBILICAL;  Surgeon: Luz Kinney MD;  Location:  OR       Medications:    No current facility-administered medications on file prior to encounter.   Current Outpatient Medications on File Prior to Encounter:  albuterol (ALBUTEROL) 108 (90 BASE) MCG/ACT inhaler Inhale 1-2 puffs into the lungs every 4 hours as needed for shortness of breath / dyspnea   Aspirin-Acetaminophen-Caffeine (EXCEDRIN PO) Take by mouth as needed   budesonide-formoterol (SYMBICORT) 160-4.5 MCG/ACT inhaler Inhale 2 puffs into the lungs 2 times daily   SUMAtriptan (IMITREX) 100 MG tablet Take 1 tablet (100 mg) by mouth at onset of headache for migraine May repeat in 2 hours if needed: max 2/day; average number of headaches monthly 2        Allergies:     Allergies   Allergen Reactions     Asa [Aspirin] Hives      Codeine Hives       Physical Exam:   Vitals:    19 2133   BP: 119/77   Resp: 16   Temp: 98.4  F (36.9  C)   TempSrc: Oral      Gen: resting comfortably, in NAD  Cx: Not assessed    NST:  FHT: , mod reid, + accels, no decels  [place]: Q 8-13 min, not felt by patient     BPP:  HISTORY: Decreased fetal movement.     COMPARISON: None.     FINDINGS:      Fetal breathing movements:  0 out of 2.  Gross body movement:               0 out of 2.  Fetal tone:        0 out of 2.  Amniotic fluid volume:                2 out of 2.     Presentation: Cephalic.   Fetal heart rate: 133 bpm. Regular rhythm.   Placenta: Anterior.  LAM: 6.6 cm. Less than 5th percentile  Umbilical artery S/D ratio: Not measured.                                                                      IMPRESSION: Total biophysical profile score is 2 out of 8.  Oligohydramnios.    Labs/Imaging:  No results found for this or any previous visit (from the past 24 hour(s)).    Assessment/Plan: Simran Fish is a 38 year old female  at 37w2d, here for decreased fetal movement  -Reactive NST, BPP resulted 2/8, off for fetal breathing, fetal tone and gross body movement. LAM 6.6.   - Dispo: Medical advice to stay for IOL here at [facility]. Patient declines. States she wants to drive to [facility] where she is scheduled for induction this AM. Her  and three kids are with her at this time. Discussed poor results of her BPP and potential for fetal compromise or demise while not being monitored or in transit. Patient wishes to leave [facility] at this time. Simran has called [facility] who also recommended she follow medical advice while here at [facility].   I have had her sign paperwork that she chooses to leave against medical advice.   I also called [facility] labor and delivery to give them report on the patient. They do plan to accept her for admission upon her arrival.    Theresa Johnson DO  OB/GYN     2019 10:30 PM

## 2019-06-02 NOTE — PROVIDER NOTIFICATION
06/02/19 0122   Provider Notification   Provider Name/Title Dr. Johnson   Method of Notification Phone   Request Evaluate - Remote   Notification Reason Status Update     MD updated that Pt has decided to leave AMA. Pt called the nurse triage line and spoke with her doctor at [facility] who [facility]. MD states she will come further discuss risks of leaving AMA.

## 2019-06-02 NOTE — PROVIDER NOTIFICATION
06/01/19 2213   Provider Notification   Provider Name/Title Dr. Johnson   Method of Notification Phone   Request Evaluate - Remote   Notification Reason Patient Arrived     MD notified of Pt arrival and all complaints. Fetus now active and NST is reactive. Pt valarie occasionally, not feeling contractions. Pt is scheduled to be induced tomorrow at [place] due to mild cholestasis. For this reason MD ordered BPP. MD states she will come discuss plan of care with Pt.

## 2019-06-02 NOTE — PROVIDER NOTIFICATION
06/02/19 0048   Provider Notification   Provider Name/Title Dr. Johnson   Method of Notification Phone   Request Evaluate - Remote   Notification Reason Lab/Diagnostic Study;Status Update     Dr. Johnson updated with results of BPP, scored 2/8. Pt scored 2/2 for amniotic fluid volume. Juice was given to Pt without increased movement. FHR category 1. Pt states fetal movement is again decreased. MD states to admit Pt for induction, perform SVE, and call back with results to obtain orders. Will update Pt with plan of care.

## 2021-04-22 ENCOUNTER — HOSPITAL ENCOUNTER (EMERGENCY)
Facility: CLINIC | Age: 41
Discharge: LEFT BEFORE DISCHARGE | End: 2021-04-22
Admitting: EMERGENCY MEDICINE
Payer: COMMERCIAL

## 2021-04-22 VITALS
DIASTOLIC BLOOD PRESSURE: 96 MMHG | OXYGEN SATURATION: 98 % | HEART RATE: 103 BPM | SYSTOLIC BLOOD PRESSURE: 158 MMHG | RESPIRATION RATE: 18 BRPM | BODY MASS INDEX: 31.89 KG/M2 | WEIGHT: 180 LBS | TEMPERATURE: 97.8 F | HEIGHT: 63 IN

## 2021-04-22 LAB
FLUAV RNA RESP QL NAA+PROBE: NEGATIVE
FLUBV RNA RESP QL NAA+PROBE: NEGATIVE
LABORATORY COMMENT REPORT: NORMAL
RSV RNA SPEC QL NAA+PROBE: NORMAL
SARS-COV-2 RNA RESP QL NAA+PROBE: NEGATIVE
SPECIMEN SOURCE: NORMAL

## 2021-04-22 PROCEDURE — 999N000104 HC STATISTIC NO CHARGE

## 2021-04-22 PROCEDURE — 87636 SARSCOV2 & INF A&B AMP PRB: CPT | Performed by: EMERGENCY MEDICINE

## 2021-04-22 PROCEDURE — C9803 HOPD COVID-19 SPEC COLLECT: HCPCS

## 2021-04-22 ASSESSMENT — MIFFLIN-ST. JEOR: SCORE: 1455.6

## 2021-04-23 NOTE — ED TRIAGE NOTES
Here for concern of sob, congestion, coughing, nausea, chills, and sore throat. Is currently 12 weeks pregnant. Stated that her child was expose to Covid at  on 4/9/2021. ABCs intact.

## 2021-07-19 ENCOUNTER — HOSPITAL ENCOUNTER (EMERGENCY)
Facility: CLINIC | Age: 41
Discharge: HOME | End: 2021-07-19
Payer: COMMERCIAL

## 2021-07-19 VITALS
OXYGEN SATURATION: 99 % | HEART RATE: 92 BPM | TEMPERATURE: 98.1 F | RESPIRATION RATE: 20 BRPM | SYSTOLIC BLOOD PRESSURE: 129 MMHG | DIASTOLIC BLOOD PRESSURE: 81 MMHG
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
## 2021-12-28 NOTE — LETTER
To Whom it may concern:      Simran Fish was seen in our Emergency Department today, 10/29/17.  I expect her condition to improve over the next two days.  She may return to work when improved.    Sincerely,        Isabella PURDY RN         Nutrition Services Progress Note    Physician: Vicente Barbosa MD  Diagnosis: Breast Cancer  Treatment:  [facility]    Food and Nutrition Related History:  Met with Ericka on C4D1 of treatment. She has been doing pretty well. Weight up.    Dr. Barbosa said she can start detoxifying one week after treatment ends in February.     Anthropometric Measurements:  Estimated body mass index is 20.69 kg/m² as calculated from the following:    Height as of 10/26/21: 5' 1.22\" (1.555 m).    Weight as of this encounter: 50 kg (110 lb 4.8 oz).    Wt Readings from Last 5 Encounters:   12/28/21 50 kg (110 lb 4.8 oz)   12/07/21 48.1 kg (106 lb)   11/16/21 47.7 kg (105 lb 3.2 oz)   11/01/21 46.4 kg (102 lb 4.7 oz)   10/26/21 47.6 kg (105 lb)     Usual Weight:  105 lbs  Weight Change:  stable    Biochemical Data, Medical Tests, and Procedures:  --    Nutrition-Focused Physical Findings:  Overall appearance: Thin but well nourished    Comparative Standards:  Estimated energy needs -  Total energy estimated needs (CS-1.1.1):   Calculation Weight: 48 kg  Calorie needs: 1822-2135 (30 - 35 kcal/kg)  Protein needs: 58-72 g (1.2-1.5 g/kg)    Nutrition Diagnosis:  Increased nutrient needs  related to malignancy as evidenced by increased daily protein, calorie and micronutrient needs to prevent further weight loss and mitigate side effects of cancer treatment.      Intervention:  Modified diet:    Small, frequent meals/snacks  64 oz caffeine free fluids  Previously Provided:   Dirty Dozen/Clean 15 2021  Breast Cancer Nutrition Packet  Phytonutrients  Potassium Content of Foods    Monitoring and Evaluation:  Amount of food:  > 75% EEE with stable weight/gain (meeting)

## 2022-01-16 ENCOUNTER — HOSPITAL ENCOUNTER (EMERGENCY)
Facility: CLINIC | Age: 42
Discharge: HOME | End: 2022-01-16
Attending: EMERGENCY MEDICINE | Admitting: EMERGENCY MEDICINE
Payer: COMMERCIAL

## 2022-01-16 VITALS
TEMPERATURE: 98.2 F | SYSTOLIC BLOOD PRESSURE: 135 MMHG | OXYGEN SATURATION: 96 % | HEART RATE: 74 BPM | RESPIRATION RATE: 20 BRPM | DIASTOLIC BLOOD PRESSURE: 91 MMHG

## 2022-01-16 DIAGNOSIS — Z20.822 SUSPECTED 2019 NOVEL CORONAVIRUS INFECTION: ICD-10-CM

## 2022-01-16 DIAGNOSIS — R19.7 DIARRHEA, UNSPECIFIED TYPE: ICD-10-CM

## 2022-01-16 PROCEDURE — 87636 SARSCOV2 & INF A&B AMP PRB: CPT | Performed by: EMERGENCY MEDICINE

## 2022-01-16 PROCEDURE — C9803 HOPD COVID-19 SPEC COLLECT: HCPCS

## 2022-01-16 PROCEDURE — 99283 EMERGENCY DEPT VISIT LOW MDM: CPT

## 2022-01-16 ASSESSMENT — ENCOUNTER SYMPTOMS
FEVER: 0
DIARRHEA: 1
COUGH: 0

## 2022-01-17 ENCOUNTER — TELEPHONE (OUTPATIENT)
Dept: NURSING | Facility: CLINIC | Age: 42
End: 2022-01-17
Payer: COMMERCIAL

## 2022-01-17 LAB
FLUAV RNA SPEC QL NAA+PROBE: NEGATIVE
FLUBV RNA RESP QL NAA+PROBE: NEGATIVE
SARS-COV-2 RNA RESP QL NAA+PROBE: POSITIVE

## 2022-01-17 NOTE — ED TRIAGE NOTES
Pt states headache, myalgias and diarrhea today. Pt states recent COVID exposure and possible flu exposure. Pt has not been vaccinated for flu or COVID. ABCs intact GCS 15

## 2022-01-17 NOTE — DISCHARGE INSTRUCTIONS
Discharge Instructions  COVID-19    COVID-19 is the disease caused by a new coronavirus. The virus spreads from person-to-person primarily by droplets when an infected person coughs or sneezes and the droplets are then breathed in by another person.    Symptoms of COVID-19  Many people have no symptoms or mild symptoms.  Symptoms usually appear within a few days, but up to 14-days, after contact with a person with COVID-19.    A mild COVID-19 illness is like a cold and can have fever, cough, sneezing, sore throat, tiredness, headache, and muscle pain.    A moderate COVID-19 illness might include shortness of breath or pneumonia on a chest x-ray.    A severe COVID-19 illness causes significant breathing problems such as low oxygen levels or more serious pneumonia.  Some patients experience loss of taste or smell which is somewhat unique to COVID-19.      Isolation and Quarantine  Testing is recommended for any person with symptoms that could be COVID-19 and often for those exposed to COVID-19. The best way to stop the spread of the virus is to avoid contact with others.    A close contact exposure is being within 6 feet of someone with COVID for 15 minutes.    Isolation refers to sick people staying away from people who are not sick.    A person in quarantine is limiting activity because they were exposed and are waiting to see if they might become sick.    If you test positive for COVID and have no symptoms, you should stay home (isolation) for 5 full days after the day of the test. You should then wear a mask when around others for another 5 days.    If you test positive for COVID and have mild symptoms, you should stay home (isolation) for at least 5 days after your symptoms began. You can return to normal activities at that time, wearing a mask when around others, for another 5 days as long as your symptoms are improving/resolving and you have been without a fever for 24 hours (without using fever-reducing  medicine).    If you test positive for COVID and have more than mild symptoms, you should stay home (isolation) for at least 10 days after your symptoms began. You can return to normal activities at that time as long as your symptoms are improving and you have been without a fever for 24 hours (without using fever-reducing medicine).  For example, if you have a fever and cough for 6 days, you need to stay home 4 more days with no fever for a total of 10 days. Or, if you have a fever and cough for 10 days, you need to stay home one more day with no fever for a total of 11 days.    If you were exposed to COVID and are not vaccinated (or it has been more than six months from your Pfizer or Moderna vaccine or two months from J&J vaccine), you should stay home (quarantine) for 5 days and then wear a mask around others for 5 additional days. A COVID test at day 5 is recommended.    If you were exposed to COVID and are vaccinated (had a booster, had two shots of Pfizer or Moderna vaccine in the last five months, or had J&J vaccine within two months), you do not need to quarantine but should wear a mask around others for 10 days and get a COVID test on day 5.    If you have symptoms but a negative test, you should stay at home until you have mild/improving symptoms and are without fever for 24 hours, using the same judgment you would for when it is safe to return to work/school from strep throat, influenza, or the common cold. If you worsen, you should consider being re-evaluated.    If you are being tested for COVID because of symptoms and your test is pending, you should stay home until you know your test result.  More details on isolation and quarantine can be found on this website from the CDC:  https://www.cdc.gov/coronavirus/2019-ncov/your-health/quarantine-isolation.html    If I have COVID, how should I protect myself and others?    Do not go to work or school. Have a friend or relative do your shopping. Do not use  public transportation (bus, train) or ridesharing (Lyft, Uber).    Separate yourself from other people in your home. As much as possible, you should stay in one room and away from other people in your home. Also, use a separate bathroom, if possible. Avoid handling pets or other animals while sick.     Wear a facemask if you need to be around other people and cover your mouth and nose with a tissue when you cough or sneeze.     Avoid sharing personal household items. You should not share dishes, drinking glasses, forks/knives/spoons, towels, or bedding with other people in your home. After using these items, they should be washed with soap and water. Clean parts of your home that are touched often (doorknobs, faucets, countertops, etc.) daily.     Wash your hands often with soap and water for at least 20 seconds or use an alcohol-based hand  containing at least 60% alcohol.     Avoid touching your face.    Treat your symptoms. You can take Acetaminophen (Tylenol) to treat body aches and fever as needed for comfort. Ibuprofen (Advil or Motrin) can be used as well if you still have symptoms after taking Tylenol. Drink fluids. Rest.    Watch for worsening symptoms such as shortness of breath/difficulty breathing or very severe weakness.    Employers/workplaces are being asked by the Centers for Disease Control (CDC) to not request notes/documentation for you to return to work or prove that you were ill. You may choose to show your employer this paperwork. Also, repeat testing should not be required to return to work.    Exercise/Sports in rare cases, COVID could affect your heart in a way that makes exercise or participation in sports dangerous.  If you have a mild COVID illness (fever, cough, sore throat, and similar symptoms but no difficulty breathing or abnormalities of the lung): After your COVID symptoms have resolved, wait 14-days before returning to activity.  If you have more than a mild illness  (meaning that you have problems with your breathing or lungs) or if you participate in competitive or strenuous activity or have a history of heart disease: Please see your primary doctor/provider prior to return to activity/competition.    Antibody treatments are available for patients with mild to moderate COVID illness in order to prevent severe illness. In general, only patients with risk factors for severe illness are eligible for treatment. For more information, to see if you are eligible, and to find treatment, go to the [facility] Emergency Department of [facility]:    [email or web address]/[email or web address]/[email or web address]     Return to the Emergency Department if:    If you are developing worsening breathing, shortness of breath, or feel worse you should seek medical attention.  If you are uncertain, contact your health care provider/clinic. If you need emergency medical attention, call 911 and tell them you have been ill.    Discharge Instructions  Adult Diarrhea    You have been seen today for diarrhea (loose stools). This is usually caused by a virus, but some bacteria, parasites, medicines, or other medical conditions can cause similar symptoms. At this time your provider does not find that your diarrhea is a sign of anything dangerous or life-threatening. However, sometimes the signs of serious illness do not show up right away. If you have new or worse symptoms, you may need to be seen again in the Emergency Department or by your primary provider.     Generally, every Emergency Department visit should have a follow-up clinic visit with either a primary or a specialty clinic/provider. Please follow-up as instructed by your emergency provider today.    Return to the Emergency Department if:  You feel you are getting dehydrated, such as being very thirsty, not urinating (peeing) like usual, or feeling faint or lightheaded.   You develop a new fever.  You have abdominal (belly) pain that seems worse than cramps,  "is in one spot, or is getting worse over time.   You have blood in your stool or your stool becomes black.  (Remember that if you take Pepto-Bismol , this will turn your stool black).   You feel very weak.    What can I do to help myself?  The most important thing to do is to drink clear liquids.   It is best to have only small, frequent sips of liquids. Drinking too much at once may cause more diarrhea. You should also replace minerals, sodium and potassium lost with diarrhea. Pedialyte  and sports drinks can help you replace these minerals. You can also drink clear liquids such as water, weak tea, apple juice, and 7-Up . Avoid acidic liquids (orange juice), caffeine (coffee) or alcohol. Milk products will make the diarrhea worse.  Eat bland (plain) foods. Soda crackers, toast, plain noodles, gelatin, applesauce and bananas are good first choices. Avoid foods that have acid, are spicy, fatty or fibrous (such as meats, coarse grains, vegetables). You may start eating these foods again in about 3 days when you are better.   Sometimes treatment includes prescription medicine to prevent diarrhea. If your provider prescribes these for you, take them as directed.   Nonprescription medicine is available for the treatment of diarrhea and can be very effective. If you use it, make sure you use the dose recommended on the package. Check with your healthcare provider before you use any medicine for diarrhea.   Do not take ibuprofen, or other nonsteroidal anti-inflammatory medicines, without checking with your healthcare provider.   Probiotics: If you have been given an antibiotic, you may want to also take a probiotic pill or eat yogurt with live cultures. Probiotics have \"good bacteria\" to help your intestines stay healthy. Studies have shown that probiotics help prevent diarrhea and other intestine problems (including C. diff infection) when you take antibiotics. You can buy these without a prescription in the pharmacy " section of the store.   If you were given a prescription for medicine here today, be sure to read all of the information (including the package insert) that comes with your prescription.  This will include important information about the medicine, its side effects, and any warnings that you need to know about.  The pharmacist who fills the prescription can provide more information and answer questions you may have about the medicine.  If you have questions or concerns that the pharmacist cannot address, please call or return to the Emergency Department.  Remember that you can always come back to the Emergency Department if you are not able to see your regular provider in the amount of time listed above, if you get any new symptoms, or if there is anything that worries you.

## 2022-01-17 NOTE — TELEPHONE ENCOUNTER
Coronavirus (COVID-19) Notification    Reason for call  Notify of POSITIVE  COVID-19 lab result, assess symptoms,  review [facility] recommendations    Lab Result   Lab test for 2019-nCoV rRt-PCR or SARS-COV-2 PCR  Oropharyngeal AND/OR nasopharyngeal swabs were POSITIVE for 2019-nCoV RNA [OR] SARS-COV-2 RNA (COVID-19) RNA     We have been unable to reach Patient by phone at this time to notify of their Positive COVID-19 result.  Left voicemail message requesting a call back to [phone] [facility] for results.        POSITIVE COVID-19 Letter sent.    Viviane Sommers RN
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
## 2022-01-18 NOTE — TELEPHONE ENCOUNTER
"Coronavirus (COVID-19) Notification    Caller Name (Patient, parent, daughter/son, grandparent, etc)  Patient     Reason for call  Notify of Positive Coronavirus (COVID-19) lab results, assess symptoms,  review  [facility] recommendations    Lab Result    Lab test:  2019-nCoV rRt-PCR or SARS-CoV-2 PCR    Oropharyngeal AND/OR nasopharyngeal swabs is POSITIVE for 2019-nCoV RNA/SARS-COV-2 PCR (COVID-19 virus)    RN Recommendations/Instructions per [facility] Coronavirus COVID-19 recommendations    Brief introduction script  Introduce self then review script:  \"I am calling on behalf of [facility].  We were notified that your Coronavirus test (COVID-19) for was POSITIVE for the virus.  I have some information to relay to you but first I wanted to mention that the [place] Dept of Health will be contacting you shortly [it's possible MD already called Patient] to talk to you more about how you are feeling and other people you have had contact with who might now also have the virus.  Also,  [facility] is Partnering with the [facility] for Covid-19 research, you may be contacted directly by research staff.\"    Assessment (Inquire about Patient's current symptoms)   Assessment   Current Symptoms at time of phone call: (if no symptoms, document No symptoms] cough   Symptoms onset (if applicable) 1/10/22     If at time of call, Patients symptoms hare worsened, the Patient should contact 911 or have someone drive them to Emergency Dept promptly:      If Patient calling 911, inform 911 personal that you have tested positive for the Coronavirus (COVID-19).  Place mask on and await 911 to arrive.    If Emergency Dept, If possible, please have another adult drive you to the Emergency Dept but you need to wear mask when in contact with other people.      Monoclonal Antibody Administration    Is the patient symptomatic at the time of result notification? No.  Does the patient fit the criteria: No    Review " information with Patient    Your result was positive. This means you have COVID-19 (coronavirus).  We have sent you a letter that reviews the information that I'll be reviewing with you now.    How can I protect others?    If you have symptoms: stay home and away from others (self-isolate) until:    You've had no fever--and no medicine that reduces fever--for 1 full day (24 hours). And       Your other symptoms have gotten better. For example, your cough or breathing has improved. And     At least 10 days have passed since your symptoms started. (If you've been told by a doctor that you have a weak immune system, wait 20 days.)     If you don't have symptoms: Stay home and away from others (self-isolate) until at least 10 days have passed since your first positive COVID-19 test. (Date test collected)    During this time:    Stay in your own room, including for meals. Use your own bathroom if you can.    Stay away from others in your home. No hugging, kissing or shaking hands. No visitors.     Don't go to work, school or anywhere else.     Clean  high touch  surfaces often (doorknobs, counters, handles, etc.). Use a household cleaning spray or wipes. You'll find a full list on the EPA website at www.epa.gov/pesticide-registration/list-n-disinfectants-use-against-sars-cov-2.     Cover your mouth and nose with a mask, tissue or other face covering to avoid spreading germs.    Wash your hands and face often with soap and water.    Make a list of people you have been in close contact with recently, even if either of you wore a face covering.   - Start your list from 2 days before you became ill or had a positive test.  - Include anyone that was within 6 feet of you for a cumulative total of 15 minutes or more in 24 hours. (Example: if you sat next to Karl for 5 minutes in the morning and 10 minutes in the afternoon, then you were in close contact for 15 minutes total that day. Karl would be added to your list.)    A  public health worker will call or text you. It is important that you answer. They will ask you questions about possible exposures to COVID-19, such as people you have been in direct contact with and places you have visited.    Tell the people on your list that you have COVID-19; they should stay away from others for 14 days starting from the last time they were in contact with you (unless you are told something different from a public health worker).     Caregivers in these groups are at risk for severe illness due to COVID-19:  o People 65 years and older  o People who live in a nursing home or long-term care facility  o People with chronic disease (lung, heart, cancer, diabetes, kidney, liver, immunologic)  o People who have a weakened immune system, including those who:  - Are in cancer treatment  - Take medicine that weakens the immune system, such as corticosteroids  - Had a bone marrow or organ transplant  - Have an immune deficiency  - Have poorly controlled HIV or AIDS  - Are obese (body mass index of 40 or higher)  - Smoke regularly    Caregivers should wear gloves while washing dishes, handling laundry and cleaning bedrooms and bathrooms.    Wash and dry laundry with special caution. Don't shake dirty laundry, and use the warmest water setting you can.    If you have a weakened immune system, ask your doctor about other actions you should take.    For more tips, go to www.cdc.gov/coronavirus/2019-ncov/downloads/10Things.pdf.    You should not go back to work until you meet the guidelines above for ending your home isolation. You don't need to be retested for COVID-19 before going back to work--studies show that you won't spread the virus if it's been at least 10 days since your symptoms started (or 20 days, if you have a weak immune system).    Employers: This document serves as formal notice of your employee's medical guidelines for going back to work. They must meet the above guidelines before going back  to work in person.    How can I take care of myself?    1. Get lots of rest. Drink extra fluids (unless a doctor has told you not to).    2. Take Tylenol (acetaminophen) for fever or pain. If you have liver or kidney problems, ask your family doctor if it's okay to take Tylenol.     Take either:     650 mg (two 325 mg pills) every 4 to 6 hours, or     1,000 mg (two 500 mg pills) every 8 hours as needed.     Note: Don't take more than 3,000 mg in one day. Acetaminophen is found in many medicines (both prescribed and over-the-counter medicines). Read all labels to be sure you don't take too much.    For children, check the Tylenol bottle for the right dose (based on their age or weight).    3. If you have other health problems (like cancer, heart failure, an organ transplant or severe kidney disease): Call your specialty clinic if you don't feel better in the next 2 days.    4. Know when to call 911: Emergency warning signs include:    Trouble breathing or shortness of breath    Pain or pressure in the chest that doesn't go away    Feeling confused like you haven't felt before, or not being able to wake up    Bluish-colored lips or face    5. Sign up for [employer]. We know it's scary to hear that you have COVID-19. We want to track your symptoms to make sure you're okay over the next 2 weeks. Please look for an email from [employer]--this is a free, online program that we'll use to keep in touch. To sign up, follow the link in the email. Learn more at [email or web address].    Where can I get more information?    [facility]: [email or web address]    Coronavirus Basics: www.health.[facility].[place]/diseases/coronavirus/basics.html    What to Do If You're Sick: www.cdc.gov/coronavirus/2019-ncov/about/steps-when-sick.html    Ending Home Isolation: www.cdc.gov/coronavirus/2019-ncov/hcp/disposition-in-home-patients.html     Caring for Someone with COVID-19:  www.cdc.gov/coronavirus/2019-ncov/if-you-are-sick/care-for-someone.html     [facility] clinical trials (COVID-19 research studies): clinicalaffairs.[facility]-clinical-trials     A Positive COVID-19 letter will be sent via [email or web address] or the mail. (Exception, no letters sent to Presurgerical/Preprocedure Patients)    Rashmi Holloway LPN

## 2022-06-27 ENCOUNTER — APPOINTMENT (OUTPATIENT)
Dept: ULTRASOUND IMAGING | Facility: CLINIC | Age: 42
End: 2022-06-27
Attending: EMERGENCY MEDICINE
Payer: COMMERCIAL

## 2022-06-27 ENCOUNTER — APPOINTMENT (OUTPATIENT)
Dept: GENERAL RADIOLOGY | Facility: CLINIC | Age: 42
End: 2022-06-27
Attending: EMERGENCY MEDICINE
Payer: COMMERCIAL

## 2022-06-27 ENCOUNTER — HOSPITAL ENCOUNTER (EMERGENCY)
Facility: CLINIC | Age: 42
Discharge: HOME | End: 2022-06-27
Attending: EMERGENCY MEDICINE | Admitting: EMERGENCY MEDICINE
Payer: COMMERCIAL

## 2022-06-27 VITALS
RESPIRATION RATE: 16 BRPM | SYSTOLIC BLOOD PRESSURE: 109 MMHG | DIASTOLIC BLOOD PRESSURE: 60 MMHG | OXYGEN SATURATION: 100 % | HEART RATE: 65 BPM | TEMPERATURE: 98.5 F

## 2022-06-27 DIAGNOSIS — E87.6 HYPOKALEMIA: ICD-10-CM

## 2022-06-27 DIAGNOSIS — Z34.91 FIRST TRIMESTER PREGNANCY: ICD-10-CM

## 2022-06-27 DIAGNOSIS — S92.302A CLOSED DISPLACED FRACTURE OF METATARSAL BONE OF LEFT FOOT, UNSPECIFIED METATARSAL, INITIAL ENCOUNTER: ICD-10-CM

## 2022-06-27 DIAGNOSIS — R19.7 DIARRHEA, UNSPECIFIED TYPE: ICD-10-CM

## 2022-06-27 DIAGNOSIS — R55 VASOVAGAL SYNCOPE: ICD-10-CM

## 2022-06-27 LAB
ANION GAP SERPL CALCULATED.3IONS-SCNC: 6 MMOL/L (ref 3–14)
ATRIAL RATE - MUSE: 67 BPM
BASOPHILS # BLD AUTO: 0 10E3/UL (ref 0–0.2)
BASOPHILS NFR BLD AUTO: 0 %
BUN SERPL-MCNC: 14 MG/DL (ref 7–30)
CALCIUM SERPL-MCNC: 8.8 MG/DL (ref 8.5–10.1)
CHLORIDE BLD-SCNC: 106 MMOL/L (ref 94–109)
CO2 SERPL-SCNC: 23 MMOL/L (ref 20–32)
CREAT SERPL-MCNC: 0.64 MG/DL (ref 0.52–1.04)
DIASTOLIC BLOOD PRESSURE - MUSE: NORMAL MMHG
EOSINOPHIL # BLD AUTO: 0.1 10E3/UL (ref 0–0.7)
EOSINOPHIL NFR BLD AUTO: 1 %
ERYTHROCYTE [DISTWIDTH] IN BLOOD BY AUTOMATED COUNT: 15.2 % (ref 10–15)
GFR SERPL CREATININE-BSD FRML MDRD: >90 ML/MIN/1.73M2
GLUCOSE BLD-MCNC: 123 MG/DL (ref 70–99)
HCT VFR BLD AUTO: 31.5 % (ref 35–47)
HGB BLD-MCNC: 9.9 G/DL (ref 11.7–15.7)
IMM GRANULOCYTES # BLD: 0 10E3/UL
IMM GRANULOCYTES NFR BLD: 0 %
INTERPRETATION ECG - MUSE: NORMAL
LYMPHOCYTES # BLD AUTO: 2.1 10E3/UL (ref 0.8–5.3)
LYMPHOCYTES NFR BLD AUTO: 22 %
MCH RBC QN AUTO: 26.8 PG (ref 26.5–33)
MCHC RBC AUTO-ENTMCNC: 31.4 G/DL (ref 31.5–36.5)
MCV RBC AUTO: 85 FL (ref 78–100)
MONOCYTES # BLD AUTO: 0.8 10E3/UL (ref 0–1.3)
MONOCYTES NFR BLD AUTO: 9 %
NEUTROPHILS # BLD AUTO: 6.3 10E3/UL (ref 1.6–8.3)
NEUTROPHILS NFR BLD AUTO: 68 %
NRBC # BLD AUTO: 0 10E3/UL
NRBC BLD AUTO-RTO: 0 /100
P AXIS - MUSE: 32 DEGREES
PLATELET # BLD AUTO: 360 10E3/UL (ref 150–450)
POTASSIUM BLD-SCNC: 3.2 MMOL/L (ref 3.4–5.3)
PR INTERVAL - MUSE: 152 MS
QRS DURATION - MUSE: 94 MS
QT - MUSE: 422 MS
QTC - MUSE: 445 MS
R AXIS - MUSE: 37 DEGREES
RBC # BLD AUTO: 3.7 10E6/UL (ref 3.8–5.2)
SODIUM SERPL-SCNC: 135 MMOL/L (ref 133–144)
SYSTOLIC BLOOD PRESSURE - MUSE: NORMAL MMHG
T AXIS - MUSE: 27 DEGREES
VENTRICULAR RATE- MUSE: 67 BPM
WBC # BLD AUTO: 9.4 10E3/UL (ref 4–11)

## 2022-06-27 PROCEDURE — 82310 ASSAY OF CALCIUM: CPT | Performed by: EMERGENCY MEDICINE

## 2022-06-27 PROCEDURE — 93005 ELECTROCARDIOGRAM TRACING: CPT | Mod: 59

## 2022-06-27 PROCEDURE — 76801 OB US < 14 WKS SINGLE FETUS: CPT

## 2022-06-27 PROCEDURE — 85025 COMPLETE CBC W/AUTO DIFF WBC: CPT | Performed by: EMERGENCY MEDICINE

## 2022-06-27 PROCEDURE — 73610 X-RAY EXAM OF ANKLE: CPT | Mod: LT

## 2022-06-27 PROCEDURE — 28470 CLTX METATARSAL FX WO MNP EA: CPT | Mod: T2

## 2022-06-27 PROCEDURE — 73630 X-RAY EXAM OF FOOT: CPT | Mod: LT

## 2022-06-27 PROCEDURE — 96360 HYDRATION IV INFUSION INIT: CPT | Mod: 59

## 2022-06-27 PROCEDURE — 250N000013 HC RX MED GY IP 250 OP 250 PS 637: Performed by: EMERGENCY MEDICINE

## 2022-06-27 PROCEDURE — 99285 EMERGENCY DEPT VISIT HI MDM: CPT | Mod: 25

## 2022-06-27 PROCEDURE — 258N000003 HC RX IP 258 OP 636: Performed by: EMERGENCY MEDICINE

## 2022-06-27 PROCEDURE — 36415 COLL VENOUS BLD VENIPUNCTURE: CPT | Performed by: EMERGENCY MEDICINE

## 2022-06-27 RX ORDER — ACETAMINOPHEN 325 MG/1
650 TABLET ORAL ONCE
Status: COMPLETED | OUTPATIENT
Start: 2022-06-27 | End: 2022-06-27

## 2022-06-27 RX ORDER — HYDROCODONE BITARTRATE AND ACETAMINOPHEN 5; 325 MG/1; MG/1
1 TABLET ORAL EVERY 6 HOURS PRN
Qty: 15 TABLET | Refills: 0 | Status: SHIPPED | OUTPATIENT
Start: 2022-06-27 | End: 2022-07-03

## 2022-06-27 RX ORDER — SODIUM CHLORIDE 9 MG/ML
INJECTION, SOLUTION INTRAVENOUS CONTINUOUS
Status: DISCONTINUED | OUTPATIENT
Start: 2022-06-27 | End: 2022-06-27 | Stop reason: HOSPADM

## 2022-06-27 RX ORDER — POTASSIUM CHLORIDE 1.5 G/1.58G
40 POWDER, FOR SOLUTION ORAL ONCE
Status: COMPLETED | OUTPATIENT
Start: 2022-06-27 | End: 2022-06-27

## 2022-06-27 RX ORDER — HYDROCODONE BITARTRATE AND ACETAMINOPHEN 5; 325 MG/1; MG/1
1 TABLET ORAL EVERY 6 HOURS PRN
Qty: 15 TABLET | Refills: 0 | Status: SHIPPED | OUTPATIENT
Start: 2022-06-27 | End: 2022-06-27

## 2022-06-27 RX ADMIN — ACETAMINOPHEN 650 MG: 325 TABLET, FILM COATED ORAL at 07:09

## 2022-06-27 RX ADMIN — SODIUM CHLORIDE 1000 ML: 9 INJECTION, SOLUTION INTRAVENOUS at 06:56

## 2022-06-27 RX ADMIN — POTASSIUM CHLORIDE 40 MEQ: 1.5 POWDER, FOR SOLUTION ORAL at 08:08

## 2022-06-27 ASSESSMENT — ENCOUNTER SYMPTOMS
VOMITING: 0
BACK PAIN: 0
ARTHRALGIAS: 1
ABDOMINAL PAIN: 0
NECK PAIN: 0
LIGHT-HEADEDNESS: 1

## 2022-06-27 NOTE — Clinical Note
Simran Fish was seen and treated in our emergency department on 6/27/2022.  She may return to work on 06/30/2022.       If you have any questions or concerns, please don't hesitate to call.      Roland Mayo MD

## 2022-06-27 NOTE — DISCHARGE INSTRUCTIONS
Do not bear weight on the left foot.  Please leave your boot in place and use crutches when walking. Keep your foot elevated is much as possible. Return to the ER for worsening pain or swelling, numbness or discoloration of the toes, or for any new concerns.    Do not bear weight on the left foot.    Please drink plenty fluids to stay hydrated.  Your loss of consciousness was likely in part related to being dehydrated.  Your potassium level slightly low today.  Please drink juice or eat fruits and vegetables.  Return to the ER if you have any recurrent dizziness.  When going from a sitting or laying position to a standing position please get up slowly.    Follow-up with your primary care physician in 2 to 3 days for recheck of the diarrhea.  If the diarrhea persists then a stool sample will need to be collected.    Do not drive, use machinery, use alcohol, use other sedating medications, or use any medication that also contains acetaminophen (Tylenol) while taking Norco.  Please use Norco sparingly due to your pregnancy.

## 2022-06-27 NOTE — ED TRIAGE NOTES
Pt reports having a syncopal episode while going to the bathroom. She woke up on the floor with left ankle pain. Of note she is 2-3 months pregnant and having diarrhea

## 2022-06-27 NOTE — ED PROVIDER NOTES
History   Chief Complaint:  Syncope     The history is provided by the patient.      Simran Fish is a 42 year old 2-3 month pregnant female  with history of sepsis who presents via EMS with a syncopal episode. The patient states she developed diarrhea this morning and when she was going to have a bowel movement, she reports feeling hot and lightheaded and had a syncopal episode. She woke up after the syncopal episode with ankle pain. She denies head pain, neck pain, back pain, and no extremity pain other than her left ankle. She notes that she is pregnant and that her last menstrual cycle was in April. She adds that she had an ultrasound last month. She denies abdominal pain or vaginal bleeding. She denies vomiting or sick exposures.    Review of Systems   Gastrointestinal: Negative for abdominal pain and vomiting.   Genitourinary: Negative for vaginal bleeding.   Musculoskeletal: Positive for arthralgias. Negative for back pain and neck pain.   Neurological: Positive for syncope and light-headedness.   All other systems reviewed and are negative.    Allergies:  Asa [Aspirin]  Codeine    Medications:  Albuterol    Past Medical History:     Migraine headache  Asthma  ASCUS on pap smear  Adjustment disorder with depressed mood  Umbilical hernia  HSV-2  Anemia in complicated pregnancy   Sepsis   Heart murmur  Anxiety    Past Surgical History:    [place] tooth extraction  Herniorrhaphy umbilical    Family History:    Diabetes- Father  Alcohol/drugs-mother    Social History:  Arrives to ED via EMS  Presents to ED alone      Physical Exam     Patient Vitals for the past 24 hrs:   BP Temp Temp src Pulse Resp SpO2   22 0632 109/60 98.5  F (36.9  C) Temporal 65 16 100 %       Physical Exam  Constitutional:       General: She is not in acute distress.     Appearance: She is not diaphoretic.   HENT:      Head: Atraumatic.      Mouth/Throat:      Pharynx: No oropharyngeal exudate.   Eyes:      General: No  scleral icterus.     Pupils: Pupils are equal, round, and reactive to light.   Cardiovascular:      Rate and Rhythm: Normal rate and regular rhythm.      Heart sounds: Normal heart sounds.   Pulmonary:      Effort: No respiratory distress.      Breath sounds: Normal breath sounds.   Abdominal:      General: Bowel sounds are normal.      Palpations: Abdomen is soft.      Tenderness: There is no abdominal tenderness (Suprapubic).   Musculoskeletal:         General: Tenderness (Medial malleolus & lateral 5th metatarsal) present.   Skin:     General: Skin is warm.      Findings: No rash.       Emergency Department Course   ECG  ECG taken at 0655, ECG read at 0700  Normal sinus rhythm    No significant change as compared to prior, dated 05/13/16.  Rate 67 bpm. MI interval 152 ms. QRS duration 94 ms. QT/QTc 422/445 ms. P-R-T axes 32 37 27.     Imaging:  XR Foot Left 3 Views   Final Result   IMPRESSION:   1.  Transverse fracture of the left second metatarsal proximal   metaphysis. Mild lateral displacement and comminution. Probable   intra-articular extension.   2.  Transverse fracture of the third metatarsal proximal metaphysis.   Mild medial displacement.   3.  Possible subtle impaction fracture of the distal cuboid.   4.  No joint malalignment.   5.  Mild first MTP degenerative arthrosis.      VICTORINA CARR MD            SYSTEM ID:  [id number]      XR Ankle Left 3 Views   Final Result   IMPRESSION:   1.  Transverse fracture of the left second metatarsal proximal   metaphysis. Mild lateral displacement and comminution. Probable   intra-articular extension.   2.  Transverse fracture of the third metatarsal proximal metaphysis.   Mild medial displacement.   3.  Possible subtle impaction fracture of the distal cuboid.   4.  No joint malalignment.   5.  Mild first MTP degenerative arthrosis.      VICTORINA CARR MD            SYSTEM ID:  [id number]      US OB <14 Weeks W Transvaginal   Final Result   IMPRESSION:    1.   Single living intrauterine gestation at 13 weeks and 0 days, EDC   1/2/2023.   2.  Small amount of hemorrhage along the right lateral and inferior   aspect of the gestational sac.   3.  Neither ovary is visualized, likely obscured by bowel gas. No   adnexal masses evident on transabdominal ultrasound. No free fluid.         AMA PROCTOR MD            SYSTEM ID:  [id number]        Report per radiology    Laboratory:  Labs Ordered and Resulted from Time of ED Arrival to Time of ED Departure   BASIC METABOLIC PANEL - Abnormal       Result Value    Sodium 135      Potassium 3.2 (*)     Chloride 106      Carbon Dioxide (CO2) 23      Anion Gap 6      Urea Nitrogen 14      Creatinine 0.64      Calcium 8.8      Glucose 123 (*)     GFR Estimate >90     CBC WITH PLATELETS AND DIFFERENTIAL - Abnormal    WBC Count 9.4      RBC Count 3.70 (*)     Hemoglobin 9.9 (*)     Hematocrit 31.5 (*)     MCV 85      MCH 26.8      MCHC 31.4 (*)     RDW 15.2 (*)     Platelet Count 360      % Neutrophils 68      % Lymphocytes 22      % Monocytes 9      % Eosinophils 1      % Basophils 0      % Immature Granulocytes 0      NRBCs per 100 WBC 0      Absolute Neutrophils 6.3      Absolute Lymphocytes 2.1      Absolute Monocytes 0.8      Absolute Eosinophils 0.1      Absolute Basophils 0.0      Absolute Immature Granulocytes 0.0      Absolute NRBCs 0.0     ENTERIC BACTERIA AND VIRUS PANEL BY RAJESH STOOL   CLOSTRIDIUM DIFFICILE TOXIN B        Procedures    Emergency Department Course:       Reviewed:  I reviewed nursing notes, vitals, past medical history and Care Everywhere    Assessments:  0647 I obtained history and examined the patient as noted above.   0821 I rechecked the patient and explained findings.    Consults:  0822 I spoke with IMKE Kline with [facility], regarding the patient's foot fracture.    Interventions:  0656 NS, 1L, IV  0709 Acetaminophen,650 mg, oral  0808 Potassium chloride, 40 mEq, oral    Disposition:  The patient was  discharged to home.     Impression & Plan     Medical Decision Making:  Potassium slightly low which is likely related to diarrhea.  This will be replaced orally.  The patient is also noted to be anemic.  However, in comparison to lab work in care everywhere from last November the patient's anemia appears to be at baseline if not slightly improved.    Most likely this was a vasovagal episode.  The patient went to have a bowel movement and felt flushed and lightheaded and then had syncope.  EKG does not show prolonged QT, WPW, Brugada syndrome, or left ventricular hypertrophy.  The patient remains hemodynamically stable here without any dizziness.    We were not able to collect a stool sample.  The patient is tolerating oral fluids though it is appropriate for outpatient management.  She was advised to take supportive cares and to follow-up closely with her primary care physician in 2 to 3 days.  She may need to collect a stool sample if she has persistent diarrhea.    The patient has a fracture of multiple metatarsals.  This was reviewed with on-call orthopedics.  No immediate surgical intervention is indicated.  The advice received is to place the patient in a walking boot and give crutches and keep her nonweightbearing.  This was conveyed to the patient and she was scheduled orthopedic follow-up in the outpatient setting.  We discussed return precautions.    With respect to the pregnancy, the patient has had no vaginal bleeding or loss of fluids.  Ultrasound shows a small subchorionic hemorrhage which was seen last month as well.  She will continue to follow with her obstetrician.    Addendum: At discharge the patient requested pain medication stronger than acetaminophen.  She does have broken bones and so certainly this is reasonable.  However, given her stage of pregnancy I discussed with her that she should start with acetaminophen and use Norco only for breakthrough pain.  She agrees to this.    Diagnosis:     ICD-10-CM    1. Vasovagal syncope  R55    2. Diarrhea, unspecified type  R19.7    3. Hypokalemia  E87.6    4. Closed displaced fracture of metatarsal bone of left foot, unspecified metatarsal, initial encounter  S92.302A    5. First trimester pregnancy  Z34.91        Discharge Medications:  New Prescriptions    No medications on file       Scribe Disclosure:  I, Donnell Pa, am serving as a scribe at 6:33 AM on 6/27/2022 to document services personally performed by Roland Mayo MD based on my observations and the provider's statements to me.            Roland Mayo MD  06/27/22 0837       Roland Mayo MD  06/27/22 0855

## 2022-07-03 ENCOUNTER — APPOINTMENT (OUTPATIENT)
Dept: GENERAL RADIOLOGY | Facility: CLINIC | Age: 42
End: 2022-07-03
Attending: EMERGENCY MEDICINE
Payer: COMMERCIAL

## 2022-07-03 ENCOUNTER — HOSPITAL ENCOUNTER (EMERGENCY)
Facility: CLINIC | Age: 42
Discharge: HOME | End: 2022-07-03
Attending: PHYSICIAN ASSISTANT | Admitting: PHYSICIAN ASSISTANT
Payer: COMMERCIAL

## 2022-07-03 ENCOUNTER — APPOINTMENT (OUTPATIENT)
Dept: ULTRASOUND IMAGING | Facility: CLINIC | Age: 42
End: 2022-07-03
Attending: EMERGENCY MEDICINE
Payer: COMMERCIAL

## 2022-07-03 VITALS
HEART RATE: 89 BPM | RESPIRATION RATE: 16 BRPM | SYSTOLIC BLOOD PRESSURE: 149 MMHG | OXYGEN SATURATION: 99 % | DIASTOLIC BLOOD PRESSURE: 89 MMHG | TEMPERATURE: 97.9 F
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
## 2022-07-03 DIAGNOSIS — S92.902S: ICD-10-CM

## 2022-07-03 PROCEDURE — 73630 X-RAY EXAM OF FOOT: CPT | Mod: LT

## 2022-07-03 PROCEDURE — 93971 EXTREMITY STUDY: CPT | Mod: LT

## 2022-07-03 PROCEDURE — 99284 EMERGENCY DEPT VISIT MOD MDM: CPT | Mod: 25

## 2022-07-03 RX ORDER — HYDROCODONE BITARTRATE AND ACETAMINOPHEN 5; 325 MG/1; MG/1
1 TABLET ORAL EVERY 6 HOURS PRN
Qty: 8 TABLET | Refills: 0 | Status: SHIPPED | OUTPATIENT
Start: 2022-07-03 | End: 2022-08-07

## 2022-07-04 NOTE — ED TRIAGE NOTES
Pt has known fracture of Lt foot diagnosed after being seen in this ER 6/27.  Pt reports 3 days ago she tripped and fell again.  Since her second fall, pt reports calf pain and Lt leg swelling.  Pt presents in walking boot.
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
## 2022-07-04 NOTE — DISCHARGE INSTRUCTIONS

## 2022-07-06 ENCOUNTER — TRANSFERRED RECORDS (OUTPATIENT)
Dept: MULTI SPECIALTY CLINIC | Facility: CLINIC | Age: 42
End: 2022-07-06

## 2022-07-06 LAB
CREATININE (EXTERNAL): 0.6 MG/DL (ref 0.55–1.02)
GFR ESTIMATED (EXTERNAL): >60 ML/MIN/1.73M2
GLUCOSE (EXTERNAL): 71 MG/DL (ref 70–100)
POTASSIUM (EXTERNAL): 4.4 MMOL/L (ref 3.5–5.1)

## 2022-07-12 RX ORDER — ACETAMINOPHEN 500 MG
500-1000 TABLET ORAL EVERY 6 HOURS PRN
Status: ON HOLD | COMMUNITY
End: 2022-08-10

## 2022-07-13 ENCOUNTER — ANESTHESIA EVENT (OUTPATIENT)
Dept: SURGERY | Facility: CLINIC | Age: 42
End: 2022-07-13
Payer: COMMERCIAL

## 2022-07-13 ENCOUNTER — HOSPITAL ENCOUNTER (OUTPATIENT)
Facility: CLINIC | Age: 42
Discharge: HOME | End: 2022-07-14
Admitting: ORTHOPAEDIC SURGERY
Payer: COMMERCIAL

## 2022-07-13 ENCOUNTER — APPOINTMENT (OUTPATIENT)
Dept: GENERAL RADIOLOGY | Facility: CLINIC | Age: 42
End: 2022-07-13
Attending: ORTHOPAEDIC SURGERY
Payer: COMMERCIAL

## 2022-07-13 ENCOUNTER — ANESTHESIA (OUTPATIENT)
Dept: SURGERY | Facility: CLINIC | Age: 42
End: 2022-07-13
Payer: COMMERCIAL

## 2022-07-13 DIAGNOSIS — S92.322A DISPLACED FRACTURE OF SECOND METATARSAL BONE, LEFT FOOT, INITIAL ENCOUNTER FOR CLOSED FRACTURE: Primary | ICD-10-CM

## 2022-07-13 LAB — SARS-COV-2 RNA RESP QL NAA+PROBE: NEGATIVE

## 2022-07-13 PROCEDURE — 272N000002 HC OR SUPPLY OTHER OPNP: Performed by: ORTHOPAEDIC SURGERY

## 2022-07-13 PROCEDURE — 370N000017 HC ANESTHESIA TECHNICAL FEE, PER MIN: Performed by: ORTHOPAEDIC SURGERY

## 2022-07-13 PROCEDURE — 250N000009 HC RX 250: Performed by: NURSE ANESTHETIST, CERTIFIED REGISTERED

## 2022-07-13 PROCEDURE — C1762 CONN TISS, HUMAN(INC FASCIA): HCPCS | Performed by: ORTHOPAEDIC SURGERY

## 2022-07-13 PROCEDURE — C1713 ANCHOR/SCREW BN/BN,TIS/BN: HCPCS | Performed by: ORTHOPAEDIC SURGERY

## 2022-07-13 PROCEDURE — 999N000179 XR SURGERY CARM FLUORO LESS THAN 5 MIN W STILLS: Mod: TC

## 2022-07-13 PROCEDURE — 360N000083 HC SURGERY LEVEL 3 W/ FLUORO, PER MIN: Performed by: ORTHOPAEDIC SURGERY

## 2022-07-13 PROCEDURE — 999N000141 HC STATISTIC PRE-PROCEDURE NURSING ASSESSMENT: Performed by: ORTHOPAEDIC SURGERY

## 2022-07-13 PROCEDURE — 250N000013 HC RX MED GY IP 250 OP 250 PS 637: Performed by: ANESTHESIOLOGY

## 2022-07-13 PROCEDURE — 250N000009 HC RX 250: Performed by: ORTHOPAEDIC SURGERY

## 2022-07-13 PROCEDURE — 710N000009 HC RECOVERY PHASE 1, LEVEL 1, PER MIN: Performed by: ORTHOPAEDIC SURGERY

## 2022-07-13 PROCEDURE — 250N000009 HC RX 250: Performed by: ANESTHESIOLOGY

## 2022-07-13 PROCEDURE — 272N000001 HC OR GENERAL SUPPLY STERILE: Performed by: ORTHOPAEDIC SURGERY

## 2022-07-13 PROCEDURE — 250N000011 HC RX IP 250 OP 636: Performed by: PHYSICIAN ASSISTANT

## 2022-07-13 PROCEDURE — U0005 INFEC AGEN DETEC AMPLI PROBE: HCPCS | Performed by: ANESTHESIOLOGY

## 2022-07-13 PROCEDURE — 93010 ELECTROCARDIOGRAM REPORT: CPT | Performed by: INTERNAL MEDICINE

## 2022-07-13 PROCEDURE — 258N000003 HC RX IP 258 OP 636: Performed by: NURSE ANESTHETIST, CERTIFIED REGISTERED

## 2022-07-13 PROCEDURE — 250N000011 HC RX IP 250 OP 636: Performed by: NURSE ANESTHETIST, CERTIFIED REGISTERED

## 2022-07-13 PROCEDURE — 250N000013 HC RX MED GY IP 250 OP 250 PS 637: Performed by: PHYSICIAN ASSISTANT

## 2022-07-13 PROCEDURE — 250N000011 HC RX IP 250 OP 636: Performed by: ANESTHESIOLOGY

## 2022-07-13 DEVICE — GRAFT BONE CRUSH CANC 15ML 400075: Type: IMPLANTABLE DEVICE | Site: ANKLE | Status: FUNCTIONAL

## 2022-07-13 DEVICE — IMPLANTABLE DEVICE: Type: IMPLANTABLE DEVICE | Site: ANKLE | Status: FUNCTIONAL

## 2022-07-13 RX ORDER — ONDANSETRON 2 MG/ML
4 INJECTION INTRAMUSCULAR; INTRAVENOUS EVERY 30 MIN PRN
Status: DISCONTINUED | OUTPATIENT
Start: 2022-07-13 | End: 2022-07-13

## 2022-07-13 RX ORDER — CEFAZOLIN SODIUM/WATER 2 G/20 ML
2 SYRINGE (ML) INTRAVENOUS SEE ADMIN INSTRUCTIONS
Status: DISCONTINUED | OUTPATIENT
Start: 2022-07-13 | End: 2022-07-13 | Stop reason: HOSPADM

## 2022-07-13 RX ORDER — HYDROMORPHONE HYDROCHLORIDE 1 MG/ML
0.5 INJECTION, SOLUTION INTRAMUSCULAR; INTRAVENOUS; SUBCUTANEOUS EVERY 5 MIN PRN
Status: DISCONTINUED | OUTPATIENT
Start: 2022-07-13 | End: 2022-07-13

## 2022-07-13 RX ORDER — ACETAMINOPHEN 325 MG/1
650 TABLET ORAL EVERY 6 HOURS PRN
Status: DISCONTINUED | OUTPATIENT
Start: 2022-07-13 | End: 2022-07-14 | Stop reason: HOSPADM

## 2022-07-13 RX ORDER — BUPIVACAINE HYDROCHLORIDE 5 MG/ML
INJECTION, SOLUTION EPIDURAL; INTRACAUDAL PRN
Status: DISCONTINUED | OUTPATIENT
Start: 2022-07-13 | End: 2022-07-13 | Stop reason: HOSPADM

## 2022-07-13 RX ORDER — SODIUM CHLORIDE, SODIUM LACTATE, POTASSIUM CHLORIDE, CALCIUM CHLORIDE 600; 310; 30; 20 MG/100ML; MG/100ML; MG/100ML; MG/100ML
INJECTION, SOLUTION INTRAVENOUS CONTINUOUS
Status: DISCONTINUED | OUTPATIENT
Start: 2022-07-13 | End: 2022-07-13 | Stop reason: HOSPADM

## 2022-07-13 RX ORDER — LIDOCAINE 40 MG/G
CREAM TOPICAL
Status: DISCONTINUED | OUTPATIENT
Start: 2022-07-13 | End: 2022-07-14 | Stop reason: HOSPADM

## 2022-07-13 RX ORDER — HYDROCODONE BITARTRATE AND ACETAMINOPHEN 5; 325 MG/1; MG/1
1 TABLET ORAL
Status: DISCONTINUED | OUTPATIENT
Start: 2022-07-13 | End: 2022-07-13

## 2022-07-13 RX ORDER — ACETAMINOPHEN 325 MG/1
650 TABLET ORAL
Status: DISCONTINUED | OUTPATIENT
Start: 2022-07-13 | End: 2022-07-14

## 2022-07-13 RX ORDER — BUPIVACAINE HYDROCHLORIDE AND EPINEPHRINE 5; 5 MG/ML; UG/ML
INJECTION, SOLUTION PERINEURAL PRN
Status: DISCONTINUED | OUTPATIENT
Start: 2022-07-13 | End: 2022-07-13

## 2022-07-13 RX ORDER — PRENATAL VIT/IRON FUM/FOLIC AC 27MG-0.8MG
1 TABLET ORAL DAILY
Status: DISCONTINUED | OUTPATIENT
Start: 2022-07-13 | End: 2022-07-14 | Stop reason: HOSPADM

## 2022-07-13 RX ORDER — OXYCODONE AND ACETAMINOPHEN 5; 325 MG/1; MG/1
1-2 TABLET ORAL EVERY 4 HOURS PRN
Status: DISCONTINUED | OUTPATIENT
Start: 2022-07-13 | End: 2022-07-13

## 2022-07-13 RX ORDER — ONDANSETRON 4 MG/1
4 TABLET, ORALLY DISINTEGRATING ORAL EVERY 30 MIN PRN
Status: DISCONTINUED | OUTPATIENT
Start: 2022-07-13 | End: 2022-07-13

## 2022-07-13 RX ORDER — LIDOCAINE 40 MG/G
CREAM TOPICAL
Status: DISCONTINUED | OUTPATIENT
Start: 2022-07-13 | End: 2022-07-13 | Stop reason: HOSPADM

## 2022-07-13 RX ORDER — HYDROCODONE BITARTRATE AND ACETAMINOPHEN 5; 325 MG/1; MG/1
1-2 TABLET ORAL EVERY 4 HOURS PRN
Status: DISCONTINUED | OUTPATIENT
Start: 2022-07-13 | End: 2022-07-14 | Stop reason: HOSPADM

## 2022-07-13 RX ORDER — PROCHLORPERAZINE MALEATE 5 MG
10 TABLET ORAL EVERY 6 HOURS PRN
Status: DISCONTINUED | OUTPATIENT
Start: 2022-07-13 | End: 2022-07-14 | Stop reason: HOSPADM

## 2022-07-13 RX ORDER — FENTANYL CITRATE 50 UG/ML
25 INJECTION, SOLUTION INTRAMUSCULAR; INTRAVENOUS
Status: DISCONTINUED | OUTPATIENT
Start: 2022-07-13 | End: 2022-07-13 | Stop reason: HOSPADM

## 2022-07-13 RX ORDER — OXYCODONE HYDROCHLORIDE 5 MG/1
5 TABLET ORAL EVERY 4 HOURS PRN
Status: DISCONTINUED | OUTPATIENT
Start: 2022-07-13 | End: 2022-07-13

## 2022-07-13 RX ORDER — AMOXICILLIN 250 MG
1-2 CAPSULE ORAL 2 TIMES DAILY PRN
Status: DISCONTINUED | OUTPATIENT
Start: 2022-07-13 | End: 2022-07-14 | Stop reason: HOSPADM

## 2022-07-13 RX ORDER — HYDROCODONE BITARTRATE AND ACETAMINOPHEN 5; 325 MG/1; MG/1
1-2 TABLET ORAL EVERY 4 HOURS PRN
Qty: 20 TABLET | Refills: 0 | Status: SHIPPED | OUTPATIENT
Start: 2022-07-13 | End: 2022-08-07

## 2022-07-13 RX ORDER — ALBUTEROL SULFATE 0.83 MG/ML
2.5 SOLUTION RESPIRATORY (INHALATION) EVERY 4 HOURS PRN
Status: DISCONTINUED | OUTPATIENT
Start: 2022-07-13 | End: 2022-07-13 | Stop reason: HOSPADM

## 2022-07-13 RX ORDER — MAGNESIUM HYDROXIDE 1200 MG/15ML
LIQUID ORAL PRN
Status: DISCONTINUED | OUTPATIENT
Start: 2022-07-13 | End: 2022-07-13 | Stop reason: HOSPADM

## 2022-07-13 RX ORDER — HYDROXYZINE HYDROCHLORIDE 25 MG/1
25-50 TABLET, FILM COATED ORAL EVERY 4 HOURS PRN
Status: DISCONTINUED | OUTPATIENT
Start: 2022-07-13 | End: 2022-07-14 | Stop reason: HOSPADM

## 2022-07-13 RX ORDER — ONDANSETRON 2 MG/ML
4 INJECTION INTRAMUSCULAR; INTRAVENOUS EVERY 6 HOURS PRN
Status: DISCONTINUED | OUTPATIENT
Start: 2022-07-13 | End: 2022-07-14 | Stop reason: HOSPADM

## 2022-07-13 RX ORDER — ONDANSETRON 2 MG/ML
INJECTION INTRAMUSCULAR; INTRAVENOUS PRN
Status: DISCONTINUED | OUTPATIENT
Start: 2022-07-13 | End: 2022-07-13

## 2022-07-13 RX ORDER — ONDANSETRON 4 MG/1
4 TABLET, ORALLY DISINTEGRATING ORAL EVERY 6 HOURS PRN
Status: DISCONTINUED | OUTPATIENT
Start: 2022-07-13 | End: 2022-07-14 | Stop reason: HOSPADM

## 2022-07-13 RX ORDER — FENTANYL CITRATE 50 UG/ML
50 INJECTION, SOLUTION INTRAMUSCULAR; INTRAVENOUS EVERY 5 MIN PRN
Status: DISCONTINUED | OUTPATIENT
Start: 2022-07-13 | End: 2022-07-13 | Stop reason: HOSPADM

## 2022-07-13 RX ORDER — LORAZEPAM 2 MG/ML
.5-1 INJECTION INTRAMUSCULAR
Status: DISCONTINUED | OUTPATIENT
Start: 2022-07-13 | End: 2022-07-13

## 2022-07-13 RX ORDER — DEXAMETHASONE SODIUM PHOSPHATE 4 MG/ML
INJECTION, SOLUTION INTRA-ARTICULAR; INTRALESIONAL; INTRAMUSCULAR; INTRAVENOUS; SOFT TISSUE PRN
Status: DISCONTINUED | OUTPATIENT
Start: 2022-07-13 | End: 2022-07-13

## 2022-07-13 RX ORDER — HYDRALAZINE HYDROCHLORIDE 20 MG/ML
5-10 INJECTION INTRAMUSCULAR; INTRAVENOUS EVERY 10 MIN PRN
Status: DISCONTINUED | OUTPATIENT
Start: 2022-07-13 | End: 2022-07-13 | Stop reason: HOSPADM

## 2022-07-13 RX ORDER — DOCUSATE SODIUM 100 MG/1
100 CAPSULE, LIQUID FILLED ORAL 2 TIMES DAILY
Status: DISCONTINUED | OUTPATIENT
Start: 2022-07-13 | End: 2022-07-14 | Stop reason: HOSPADM

## 2022-07-13 RX ORDER — SODIUM CHLORIDE, SODIUM LACTATE, POTASSIUM CHLORIDE, CALCIUM CHLORIDE 600; 310; 30; 20 MG/100ML; MG/100ML; MG/100ML; MG/100ML
INJECTION, SOLUTION INTRAVENOUS CONTINUOUS PRN
Status: DISCONTINUED | OUTPATIENT
Start: 2022-07-13 | End: 2022-07-13

## 2022-07-13 RX ORDER — IPRATROPIUM BROMIDE AND ALBUTEROL SULFATE 2.5; .5 MG/3ML; MG/3ML
3 SOLUTION RESPIRATORY (INHALATION)
Status: DISCONTINUED | OUTPATIENT
Start: 2022-07-13 | End: 2022-07-14 | Stop reason: HOSPADM

## 2022-07-13 RX ORDER — MEPERIDINE HYDROCHLORIDE 25 MG/ML
12.5 INJECTION INTRAMUSCULAR; INTRAVENOUS; SUBCUTANEOUS
Status: DISCONTINUED | OUTPATIENT
Start: 2022-07-13 | End: 2022-07-13

## 2022-07-13 RX ORDER — CEFAZOLIN SODIUM/WATER 2 G/20 ML
2 SYRINGE (ML) INTRAVENOUS
Status: DISCONTINUED | OUTPATIENT
Start: 2022-07-13 | End: 2022-07-13 | Stop reason: HOSPADM

## 2022-07-13 RX ORDER — PROPOFOL 10 MG/ML
INJECTION, EMULSION INTRAVENOUS PRN
Status: DISCONTINUED | OUTPATIENT
Start: 2022-07-13 | End: 2022-07-13

## 2022-07-13 RX ORDER — FENTANYL CITRATE 50 UG/ML
INJECTION, SOLUTION INTRAMUSCULAR; INTRAVENOUS PRN
Status: DISCONTINUED | OUTPATIENT
Start: 2022-07-13 | End: 2022-07-13

## 2022-07-13 RX ORDER — LIDOCAINE HYDROCHLORIDE 10 MG/ML
INJECTION, SOLUTION INFILTRATION; PERINEURAL PRN
Status: DISCONTINUED | OUTPATIENT
Start: 2022-07-13 | End: 2022-07-13

## 2022-07-13 RX ADMIN — FENTANYL CITRATE 100 MCG: 50 INJECTION, SOLUTION INTRAMUSCULAR; INTRAVENOUS at 15:21

## 2022-07-13 RX ADMIN — PHENYLEPHRINE HYDROCHLORIDE 100 MCG: 10 INJECTION INTRAVENOUS at 16:11

## 2022-07-13 RX ADMIN — PHENYLEPHRINE HYDROCHLORIDE 100 MCG: 10 INJECTION INTRAVENOUS at 15:35

## 2022-07-13 RX ADMIN — DOCUSATE SODIUM 100 MG: 100 CAPSULE, LIQUID FILLED ORAL at 21:37

## 2022-07-13 RX ADMIN — SODIUM CHLORIDE, POTASSIUM CHLORIDE, SODIUM LACTATE AND CALCIUM CHLORIDE: 600; 310; 30; 20 INJECTION, SOLUTION INTRAVENOUS at 16:11

## 2022-07-13 RX ADMIN — OXYCODONE HYDROCHLORIDE 5 MG: 5 TABLET ORAL at 18:57

## 2022-07-13 RX ADMIN — PHENYLEPHRINE HYDROCHLORIDE 50 MCG: 10 INJECTION INTRAVENOUS at 15:27

## 2022-07-13 RX ADMIN — ONDANSETRON HYDROCHLORIDE 4 MG: 2 INJECTION, SOLUTION INTRAVENOUS at 16:32

## 2022-07-13 RX ADMIN — PHENYLEPHRINE HYDROCHLORIDE 50 MCG: 10 INJECTION INTRAVENOUS at 15:32

## 2022-07-13 RX ADMIN — FENTANYL CITRATE 50 MCG: 50 INJECTION, SOLUTION INTRAMUSCULAR; INTRAVENOUS at 17:19

## 2022-07-13 RX ADMIN — BUPIVACAINE HYDROCHLORIDE AND EPINEPHRINE BITARTRATE 30 ML: 5; .005 INJECTION, SOLUTION EPIDURAL; INTRACAUDAL; PERINEURAL at 14:45

## 2022-07-13 RX ADMIN — PHENYLEPHRINE HYDROCHLORIDE 100 MCG: 10 INJECTION INTRAVENOUS at 15:50

## 2022-07-13 RX ADMIN — PHENYLEPHRINE HYDROCHLORIDE 150 MCG: 10 INJECTION INTRAVENOUS at 15:39

## 2022-07-13 RX ADMIN — PROPOFOL 50 MG: 10 INJECTION, EMULSION INTRAVENOUS at 15:25

## 2022-07-13 RX ADMIN — SODIUM CHLORIDE, POTASSIUM CHLORIDE, SODIUM LACTATE AND CALCIUM CHLORIDE: 600; 310; 30; 20 INJECTION, SOLUTION INTRAVENOUS at 14:15

## 2022-07-13 RX ADMIN — HYDROMORPHONE HYDROCHLORIDE 1 MG: 1 INJECTION, SOLUTION INTRAMUSCULAR; INTRAVENOUS; SUBCUTANEOUS at 15:31

## 2022-07-13 RX ADMIN — PROPOFOL 150 MG: 10 INJECTION, EMULSION INTRAVENOUS at 15:21

## 2022-07-13 RX ADMIN — DEXAMETHASONE SODIUM PHOSPHATE 4 MG: 4 INJECTION, SOLUTION INTRA-ARTICULAR; INTRALESIONAL; INTRAMUSCULAR; INTRAVENOUS; SOFT TISSUE at 15:21

## 2022-07-13 RX ADMIN — PHENYLEPHRINE HYDROCHLORIDE 100 MCG: 10 INJECTION INTRAVENOUS at 16:23

## 2022-07-13 RX ADMIN — LIDOCAINE HYDROCHLORIDE 50 MG: 10 INJECTION, SOLUTION INFILTRATION; PERINEURAL at 15:21

## 2022-07-13 RX ADMIN — PRENATAL VITAMINS-IRON FUMARATE 27 MG IRON-FOLIC ACID 0.8 MG TABLET 1 TABLET: at 21:37

## 2022-07-13 RX ADMIN — FENTANYL CITRATE 50 MCG: 50 INJECTION, SOLUTION INTRAMUSCULAR; INTRAVENOUS at 17:05

## 2022-07-13 RX ADMIN — Medication 2 G: at 15:18

## 2022-07-13 NOTE — CARE PLAN
FHT by kristie were obtained preop on a 14 week patient & were in the 140's.  An open healing wound was noted on the patient's left lower arm & scars were apparent.  Patient did admit a history of cutting.

## 2022-07-13 NOTE — DISCHARGE INSTRUCTIONS
GENERAL ANESTHESIA OR SEDATION ADULT DISCHARGE INSTRUCTIONS   SPECIAL PRECAUTIONS FOR 24 HOURS AFTER SURGERY    IT IS NOT UNUSUAL TO FEEL LIGHT-HEADED OR FAINT, UP TO 24 HOURS AFTER SURGERY OR WHILE TAKING PAIN MEDICATION.  IF YOU HAVE THESE SYMPTOMS; SIT FOR A FEW MINUTES BEFORE STANDING AND HAVE SOMEONE ASSIST YOU WHEN YOU GET UP TO WALK OR USE THE BATHROOM.    YOU SHOULD REST AND RELAX FOR THE NEXT 24 HOURS AND YOU MUST MAKE ARRANGEMENTS TO HAVE SOMEONE STAY WITH YOU FOR AT LEAST 24 HOURS AFTER YOUR DISCHARGE.  AVOID HAZARDOUS AND STRENUOUS ACTIVITIES.  DO NOT MAKE IMPORTANT DECISIONS FOR 24 HOURS.    DO NOT DRIVE ANY VEHICLE OR OPERATE MECHANICAL EQUIPMENT FOR 24 HOURS FOLLOWING THE END OF YOUR SURGERY.  EVEN THOUGH YOU MAY FEEL NORMAL, YOUR REACTIONS MAY BE AFFECTED BY THE MEDICATION YOU HAVE RECEIVED.    DO NOT DRINK ALCOHOLIC BEVERAGES FOR 24 HOURS FOLLOWING YOUR SURGERY.    DRINK CLEAR LIQUIDS (APPLE JUICE, GINGER ALE, 7-UP, BROTH, ETC.).  PROGRESS TO YOUR REGULAR DIET AS YOU FEEL ABLE.    YOU MAY HAVE A DRY MOUTH, A SORE THROAT, MUSCLES ACHES OR TROUBLE SLEEPING.  THESE SHOULD GO AWAY AFTER 24 HOURS.    CALL YOUR DOCTOR FOR ANY OF THE FOLLOWING:  SIGNS OF INFECTION (FEVER, GROWING TENDERNESS AT THE SURGERY SITE, A LARGE AMOUNT OF DRAINAGE OR BLEEDING, SEVERE PAIN, FOUL-SMELLING DRAINAGE, REDNESS OR SWELLING.    IT HAS BEEN OVER 8 TO 10 HOURS SINCE SURGERY AND YOU ARE STILL NOT ABLE TO URINATE (PASS WATER).          Your home care referral was [facility] to [facility]  If you haven't heard from them within the next 24-48 hours,  Please call them at [phone].

## 2022-07-13 NOTE — ANESTHESIA POSTPROCEDURE EVALUATION
Patient: Simran Fish    Procedure: Procedure(s):  Second and third tarsometatarsal arthrodesis       Anesthesia Type:  General    Note:  Disposition: Outpatient   Postop Pain Control: Uneventful            Sign Out: Well controlled pain   PONV: No   Neuro/Psych: Uneventful            Sign Out: Acceptable/Baseline neuro status   Airway/Respiratory: Uneventful            Sign Out: Acceptable/Baseline resp. status   CV/Hemodynamics: Uneventful            Sign Out: Acceptable CV status   Other NRE: NONE   DID A NON-ROUTINE EVENT OCCUR? No           Last vitals:  Vitals Value Taken Time   /72 07/13/22 1805   Temp 96.7  F (35.9  C) 07/13/22 1800   Pulse 74 07/13/22 1816   Resp 11 07/13/22 1816   SpO2 100 % 07/13/22 1816   Vitals shown include unvalidated device data.    Electronically Signed By: Syl Saez MD  July 13, 2022  6:18 PM

## 2022-07-13 NOTE — ANESTHESIA CARE TRANSFER NOTE
Patient: Simran Fish    Procedure: Procedure(s):  Second and third tarsometatarsal arthrodesis       Diagnosis: Lisfranc dislocation [S93.326A]  Diagnosis Additional Information: No value filed.    Anesthesia Type:   General     Note:    Oropharynx: oropharynx clear of all foreign objects  Level of Consciousness: drowsy  Oxygen Supplementation: face mask  Level of Supplemental Oxygen (L/min / FiO2): 10  Independent Airway: airway patency satisfactory and stable  Dentition: dentition unchanged  Vital Signs Stable: post-procedure vital signs reviewed and stable  Report to RN Given: handoff report given  Patient transferred to: PACU    Handoff Report: Identifed the Patient, Identified the Reponsible Provider, Reviewed the pertinent medical history, Discussed the surgical course, Reviewed Intra-OP anesthesia mangement and issues during anesthesia, Set expectations for post-procedure period and Allowed opportunity for questions and acknowledgement of understanding      Vitals:  Vitals Value Taken Time   /73 07/13/22 1647   Temp     Pulse 92 07/13/22 1649   Resp 15 07/13/22 1649   SpO2 100 % 07/13/22 1649   Vitals shown include unvalidated device data.    Electronically Signed By: PEACE Winter CRNA  July 13, 2022  4:50 PM

## 2022-07-13 NOTE — PROVIDER NOTIFICATION
Pt noted to have cut herself, states she doesn't want to die. Has no suicidal ideation at this time.  Dr Chacon was notified and interviewed pt.

## 2022-07-13 NOTE — OR NURSING
Patient complains of chest pain.  She displays a flat affect but rates pain 7/10 that she describes as pressure. Vital signs are 125/76, HR 82, RR 18, 96% RA. No SOB or visible signs of distress.  MDA notified.  12 lead EKG ordered and reads NSR.

## 2022-07-13 NOTE — ANESTHESIA PREPROCEDURE EVALUATION
Anesthesia Pre-Procedure Evaluation    Patient: Simran Fish   MRN: [id number] : 1980        Procedure : Procedure(s):  Second tarsometatarsal arthrodesis with possible first and third tarsometatarsal arthrodesis          Past Medical History:   Diagnosis Date     Abnormal Pap smear      ASCUS on Pap smear 7/16/10,  10/19/11    neg HPV,  Pos HPV     Asthma      heart murmur      Heart murmur      hsv 2015     HSV-2 infection 2015     Migraines       Past Surgical History:   Procedure Laterality Date     HERNIORRHAPHY UMBILICAL  2014    Procedure: HERNIORRHAPHY UMBILICAL;  Surgeon: Luz Kinney MD;  Location: [facility] OR     ORTHOPEDIC SURGERY Right 2019    ORIF hand     ZZC NONSPECIFIC PROCEDURE  2008    wisdom teeth      Allergies   Allergen Reactions     Asa [Aspirin] Hives     Codeine Hives      Social History     Tobacco Use     Smoking status: Never Smoker     Smokeless tobacco: Never Used   Substance Use Topics     Alcohol use: No      Wt Readings from Last 1 Encounters:   22 81.6 kg (180 lb)        Anesthesia Evaluation            ROS/MED HX  ENT/Pulmonary:     (+) Mild Persistent, asthma     Neurologic:     (+) migraines,     Cardiovascular:     (+) -----valvular problems/murmurs     METS/Exercise Tolerance:     Hematologic:  - neg hematologic  ROS     Musculoskeletal:   (+) fracture, Fracture location: LLE,     GI/Hepatic:  - neg GI/hepatic ROS     Renal/Genitourinary:  - neg Renal ROS     Endo:     (+) Obesity,     Psychiatric/Substance Use:     (+) psychiatric history depression     Infectious Disease:  - neg infectious disease ROS     Malignancy:       Other:      (+) Possibly pregnant, LMP: 14 weeks, ,         Physical Exam    Airway        Mallampati: II   TM distance: > 3 FB   Neck ROM: full   Mouth opening: > 3 cm    Respiratory Devices and Support         Dental  no notable dental history         Cardiovascular   cardiovascular exam normal           Pulmonary   pulmonary exam normal                OUTSIDE LABS:  CBC:   Lab Results   Component Value Date    WBC 9.4 06/27/2022    WBC 6.9 10/29/2017    HGB 9.9 (L) 06/27/2022    HGB 12.1 10/29/2017    HCT 31.5 (L) 06/27/2022    HCT 37.3 10/29/2017     06/27/2022     10/29/2017     BMP:   Lab Results   Component Value Date     06/27/2022     10/29/2017    POTASSIUM 3.2 (L) 06/27/2022    POTASSIUM 3.7 10/29/2017    CHLORIDE 106 06/27/2022    CHLORIDE 107 10/29/2017    CO2 23 06/27/2022    CO2 26 10/29/2017    BUN 14 06/27/2022    BUN 6 (L) 10/29/2017    CR 0.64 06/27/2022    CR 0.69 10/29/2017     (H) 06/27/2022    GLC 91 10/29/2017     COAGS: No results found for: PTT, INR, FIBR  POC:   Lab Results   Component Value Date    HCG Negative 10/29/2017    HCGS Negative 05/05/2014     HEPATIC:   Lab Results   Component Value Date    ALBUMIN 3.9 05/05/2014    PROTTOTAL 7.2 05/05/2014    ALT 33 05/05/2014    AST 20 05/05/2014    ALKPHOS 52 05/05/2014    BILITOTAL 0.5 05/05/2014     OTHER:   Lab Results   Component Value Date    СВЕЛТАНА 8.8 06/27/2022    LIPASE 191 05/05/2014    TSH 1.05 04/28/2015       Anesthesia Plan    ASA Status:  2   NPO Status:  NPO Appropriate    Anesthesia Type: General.     - Airway: LMA   Induction: Intravenous.   Maintenance: Balanced.        Consents    Anesthesia Plan(s) and associated risks, benefits, and realistic alternatives discussed. Questions answered and patient/representative(s) expressed understanding.    - Discussed:     - Discussed with:  Patient      - Extended Intubation/Ventilatory Support Discussed: No.      - Patient is DNR/DNI Status: No    Use of blood products discussed: No .     Postoperative Care    Pain management: IV analgesics, Oral pain medications, Multi-modal analgesia, Peripheral nerve block (Single Shot).   PONV prophylaxis: Ondansetron (or other 5HT-3), Dexamethasone or Solumedrol     Comments:    Other Comments: Fetal doppler pre  and post op.      The surgeon has given a verbal order transferring care of this patient to me for the performance of a regional analgesia block for post-op pain control. It is requested of me because I am uniquely trained and qualified to perform this block and the surgeon is neither trained nor qualified to perform this procedure.    Ultrasound guided peripheral nerve block(s) discussed. The patient was informed that undergoing the block is not required for surgery to proceed and is optional, but they can be beneficial in reducing post operative pain medication requirements for a period of time (several hours to a few days). Block rationale, procedure, expected results, and block specific side effects reviewed with the patient. Risks, including but not limited to bleeding, infection, nerve damage/damage to surrounding structures, medication adverse/allergic reactions, and block failure discussed.  Questions encouraged and answered.  The patient wishes to proceed.               Sly Saez MD

## 2022-07-13 NOTE — PROGRESS NOTES
Ortho Progress Note  In the preoperative area one of the nurses noticed a laceration along the radial and volar aspect of the left wrist of the patient.  This was healing well and there were no signs of infection..  The nurse informed me of this laceration as it looked like a wound that was self-inflicted.  I did address this with Simran.  She states that she did inflict this on herself approximately 2 weeks prior.  I did ask her about suicidal ideation.  She has no suicidal ideation at this time.  At the time when she made that self-inflicted wound she did not have suicidal ideation.  We did talk about depression and potential resources that may be available to her.  I did mention to her that I would reach out to her OB/GYN doctor to discuss antepartum and postpartum depression resources.  Because she currently has no suicidal ideation and has no desire to hurt herself at this point I think she is safe to return home without being admitted to the hospital.  I also discussed this with her sister following the surgery that she needs to support in this immediate postoperative period we will follow up with her to ensure that she is in a safe place and that she has no desire to harm herself.  We will follow her very closely in the postoperative period and provide resources as necessary and we may even need to admit her to the hospital if she does develop suicidal ideation.

## 2022-07-13 NOTE — ANESTHESIA PROCEDURE NOTES
Sciatic Procedure Note    Pre-Procedure   Staff -        Anesthesiologist:  Sly Saez MD       Performed By: anesthesiologist       Referred By: sherine       Location: pre-op       Pre-Anesthestic Checklist: patient identified, IV checked, site marked, risks and benefits discussed, informed consent, monitors and equipment checked, pre-op evaluation, at physician/surgeon's request and post-op pain management  Timeout:       Correct Patient: Yes        Correct Procedure: Yes        Correct Site: Yes        Correct Position: Yes        Correct Laterality: Yes        Site Marked: Yes  Procedure Documentation  Procedure: Sciatic       Diagnosis: FRACTURE       Laterality: left       Patient Position: RLD       Patient Prep/Sterile Barriers: sterile gloves, mask       Skin prep: Chloraprep       Local skin infiltrated with 3 mL of 2% lidocaine.  (popliteal approach).       Needle Type: insulated and short bevel       Needle Gauge: 21.        Needle Length (Inches): 4        Ultrasound guided       1. Ultrasound was used to identify targeted nerve, plexus, vascular marker, or fascial plane and place a needle adjacent to it in real-time.       2. Ultrasound was used to visualize the spread of anesthetic in close proximity to the above referenced structure.       4. The visualized anatomic structures appeared normal.       5. There were no apparent abnormal pathologic findings.    Assessment/Narrative         The placement was negative for: blood aspirated, painful injection and site bleeding       Paresthesias: No.       Bolus given via needle. no blood aspirated via catheter.        Secured via.        Insertion/Infusion Method: Single Shot       Complications: none       Injection made incrementally with aspirations every 5 mL.     Comments:  Good LA spread around nerve just proximal to nerve bifurcation

## 2022-07-14 ENCOUNTER — APPOINTMENT (OUTPATIENT)
Dept: PHYSICAL THERAPY | Facility: CLINIC | Age: 42
End: 2022-07-14
Attending: PHYSICIAN ASSISTANT
Payer: COMMERCIAL

## 2022-07-14 ENCOUNTER — APPOINTMENT (OUTPATIENT)
Dept: PHYSICAL THERAPY | Facility: CLINIC | Age: 42
End: 2022-07-14
Payer: COMMERCIAL

## 2022-07-14 VITALS
SYSTOLIC BLOOD PRESSURE: 130 MMHG | DIASTOLIC BLOOD PRESSURE: 79 MMHG | RESPIRATION RATE: 17 BRPM | HEART RATE: 101 BPM | HEIGHT: 63 IN | WEIGHT: 180 LBS | OXYGEN SATURATION: 98 % | BODY MASS INDEX: 31.89 KG/M2 | TEMPERATURE: 97.9 F

## 2022-07-14 LAB
ATRIAL RATE - MUSE: 79 BPM
DIASTOLIC BLOOD PRESSURE - MUSE: NORMAL MMHG
INTERPRETATION ECG - MUSE: NORMAL
P AXIS - MUSE: 52 DEGREES
PR INTERVAL - MUSE: 158 MS
QRS DURATION - MUSE: 84 MS
QT - MUSE: 388 MS
QTC - MUSE: 444 MS
R AXIS - MUSE: 19 DEGREES
SYSTOLIC BLOOD PRESSURE - MUSE: NORMAL MMHG
T AXIS - MUSE: 31 DEGREES
VENTRICULAR RATE- MUSE: 79 BPM

## 2022-07-14 PROCEDURE — 97116 GAIT TRAINING THERAPY: CPT | Mod: GP | Performed by: PHYSICAL THERAPIST

## 2022-07-14 PROCEDURE — 97530 THERAPEUTIC ACTIVITIES: CPT | Mod: GP | Performed by: PHYSICAL THERAPIST

## 2022-07-14 PROCEDURE — 97110 THERAPEUTIC EXERCISES: CPT | Mod: GP | Performed by: PHYSICAL THERAPIST

## 2022-07-14 PROCEDURE — 250N000013 HC RX MED GY IP 250 OP 250 PS 637: Performed by: PHYSICIAN ASSISTANT

## 2022-07-14 PROCEDURE — 97161 PT EVAL LOW COMPLEX 20 MIN: CPT | Mod: GP | Performed by: PHYSICAL THERAPIST

## 2022-07-14 RX ADMIN — HYDROCODONE BITARTRATE AND ACETAMINOPHEN 1 TABLET: 5; 325 TABLET ORAL at 05:06

## 2022-07-14 RX ADMIN — DOCUSATE SODIUM 100 MG: 100 CAPSULE, LIQUID FILLED ORAL at 08:29

## 2022-07-14 RX ADMIN — HYDROCODONE BITARTRATE AND ACETAMINOPHEN 2 TABLET: 5; 325 TABLET ORAL at 14:28

## 2022-07-14 RX ADMIN — ACETAMINOPHEN 650 MG: 325 TABLET, FILM COATED ORAL at 03:22

## 2022-07-14 RX ADMIN — PRENATAL VITAMINS-IRON FUMARATE 27 MG IRON-FOLIC ACID 0.8 MG TABLET 1 TABLET: at 08:29

## 2022-07-14 NOTE — PLAN OF CARE
PRIMARY DIAGNOSIS: Open reduction internal fixation of left Lisfranc injury, Second and third tarsometatarsal arthrodesis  OUTPATIENT/OBSERVATION GOALS TO BE MET BEFORE DISCHARGE:  1. Stable vital signs Yes  2. Tolerating diet:Yes  3. Pain controlled with oral pain medications:  Yes  4. Positive bowel sounds:  Yes  5. Voiding without difficulty:  Yes  6. Able to ambulate:  Yes  7. Provider specific discharge goals met:  Yes    Discharge Planner Nurse   Safe discharge environment identified: Yes  Barriers to discharge: No       Entered by: Skylar Walker RN 07/14/2022 12:17 PM     Please review provider order for any additional goals.   Nurse to notify provider when observation goals have been met and patient is ready for discharge.    PT has seen, pain controlled - declining intervention, LLE elevated on wedge, ride can  after 6PM today, SW following.

## 2022-07-14 NOTE — PLAN OF CARE
PRIMARY DIAGNOSIS: Open reduction internal fixation of left Lisfranc injury, Second and third tarsometatarsal arthrodesis  OUTPATIENT/OBSERVATION GOALS TO BE MET BEFORE DISCHARGE:  1. Stable vital signs Yes  2. Tolerating diet:Yes  3. Pain controlled with oral pain medications:  Yes  4. Positive bowel sounds:  Yes  5. Voiding without difficulty:  Yes  6. Able to ambulate:  Yes  7. Provider specific discharge goals met:  Yes    Discharge Planner Nurse   Safe discharge environment identified: Yes  Barriers to discharge: PT to assess       Entered by: Skylar Walker RN 07/14/2022 8:00AM     Please review provider order for any additional goals.   Nurse to notify provider when observation goals have been met and patient is ready for discharge.       A&Ox4, PT to see, pain tolerable - declining intervention, LLE elevated, CMS intact, denies SI, flat affect, plan of care reviewed with pt, will continue to provide supportive cares.

## 2022-07-14 NOTE — PROGRESS NOTES
[facility]    Orthopedics  Daily Post-Op Note    Assessment & Plan   Procedure(s):  Second and third tarsometatarsal arthrodesis   -1 Day Post-Op  Doing well.  No immediate surgical complications identified.  No excessive bleeding  Pain well-controlled.    Plan:  -Continue supportive and symptomatic treatment  -Start or continue physical therapy  -Pain control measures  -Advance diet as tolerated  -Nonweightbearing left lower extremity  -Keep splint clean dry and intact  -Social work consulted for recent history of self-inflicted laceration to the left wrist.  -She is okay to discharge from an orthopedic standpoint but we will need to ensure that she is safe to return home.  If she is cleared by the social work team and the physical therapy team she is okay to be discharged.      Aramis Chacon MD, MD    Interval History   I saw Simran this morning and she is doing well from a pain control perspective.  Last night I was contacted following surgery and the patient was concerned with discharge  and likely did not have a ride home.   She also had some chest pain which an EKG was done and was normal.  The MDA cleared her to return home but with her recent history of self-inflicted laceration to the left wrist and some substantial concern for not having a safe environment to return to when she gets home we decided to admit her to the hospital.  We will have our social work team see her today.  We will also have her physical therapy team see her today to ensure that she is able to get around with nonweightbearing on the left lower extremity.    Physical Exam   Temp: 97.8  F (36.6  C) Temp src: Oral BP: 106/59 Pulse: 90   Resp: 16 SpO2: 98 % O2 Device: None (Room air) Oxygen Delivery: 2 LPM  Vitals:    07/13/22 1354   Weight: 81.6 kg (180 lb)     Vital Signs with Ranges  Temp:  [96.7  F (35.9  C)-98.3  F (36.8  C)] 97.8  F (36.6  C)  Pulse:  [] 90  Resp:  [10-18] 16  BP: (106-162)/(59-89)  106/59  SpO2:  [92 %-100 %] 98 %  I/O last 3 completed shifts:  In: 1100 [I.V.:1100]  Out: -     Wound clean and dry with minimal or no drainage.  Surrounding skin with minimal erythema.  Dressing dry and intact.  Block has worn off with sensation on dorsal and plantar aspects of the toes.  Splint is clean dry and intact.  She is able to flex and extend at the toes.    Medications        docusate sodium  100 mg Oral BID     prenatal multivitamin w/iron  1 tablet Oral Daily     sodium chloride (PF)  3 mL Intracatheter Q8H       Data   Results for orders placed or performed during the hospital encounter of 07/13/22 (from the past 24 hour(s))   Asymptomatic COVID-19 Virus (Coronavirus) by PCR Nasopharyngeal    Specimen: Nasopharyngeal; Swab   Result Value Ref Range    SARS CoV2 PCR Negative Negative    Narrative    Testing was performed using the Xpert Xpress SARS-CoV-2 Assay on the   Cepheid Gene-Xpert Instrument Systems. Additional information about   this Emergency Use Authorization (EUA) assay can be found via the Lab   Guide. This test should be ordered for the detection of SARS-CoV-2 in   individuals who meet SARS-CoV-2 clinical and/or epidemiological   criteria. Test performance is unknown in asymptomatic patients. This   test is for in vitro diagnostic use under the FDA EUA for   laboratories certified under CLIA to perform high complexity testing.   This test has not been FDA cleared or approved. A negative result   does not rule out the presence of PCR inhibitors in the specimen or   target RNA in concentration below the limit of detection for the   assay. The possibility of a false negative should be considered if   the patient's recent exposure or clinical presentation suggests   COVID-19. This test was validated by the [facility]. This laboratory is certified under the Clinical Laboratory Improvement Amendments of 1988 (CLIA-88) as qualified to perform high complexity  laboratory testing.     XR Surgery BIANKA L/T 5 Min Fluoro w Stills    Narrative    This exam was marked as non-reportable because it will not be read by a   radiologist or a [place] non-radiologist provider.         EKG 12-lead, tracing only   Result Value Ref Range    Systolic Blood Pressure  mmHg    Diastolic Blood Pressure  mmHg    Ventricular Rate 79 BPM    Atrial Rate 79 BPM    VT Interval 158 ms    QRS Duration 84 ms     ms    QTc 444 ms    P Axis 52 degrees    R AXIS 19 degrees    T Axis 31 degrees    Interpretation ECG Sinus rhythm  Normal ECG

## 2022-07-14 NOTE — PHARMACY-ADMISSION MEDICATION HISTORY
Admission medication history interview status for this patient is complete. See [facility] admission navigator for allergy information, prior to admission medications and immunization status.    PTA meds completed by pre-admitting nurse Phuong Forbes and reviewed by pharmacy     Prior to Admission medications    Medication Sig Last Dose Taking? Auth Provider Long Term End Date   acetaminophen (TYLENOL) 500 MG tablet Take 500-1,000 mg by mouth every 6 hours as needed Past Week at Unknown time Yes Reported, Patient     albuterol (ALBUTEROL) 108 (90 BASE) MCG/ACT inhaler Inhale 1-2 puffs into the lungs every 4 hours as needed for shortness of breath / dyspnea  Yes Juliana Reilly PA-C Yes    Aspirin-Acetaminophen-Caffeine (EXCEDRIN PO) Take by mouth as needed Past Week at Unknown time Yes Reported, Patient     budesonide-formoterol (SYMBICORT) 160-4.5 MCG/ACT inhaler Inhale 2 puffs into the lungs 2 times daily More than a month at Unknown time Yes Juliana Reilly PA-C Yes    HYDROcodone-acetaminophen (NORCO) 5-325 MG tablet Take 1-2 tablets by mouth every 4 hours as needed for moderate to severe pain  Yes Shu Diamond PA-C     HYDROcodone-acetaminophen (NORCO) 5-325 MG tablet Take 1 tablet by mouth every 6 hours as needed for moderate to severe pain Past Week at Unknown time Yes Cosmo Valadez PA-C     PRENATAL VIT-DOCUSATE-IRON-FA PO Take by mouth daily  Yes Reported, Patient

## 2022-07-14 NOTE — PLAN OF CARE
[facility]      OUTPATIENT PHYSICAL THERAPY EVALUATION  PLAN OF TREATMENT FOR OUTPATIENT REHABILITATION  (COMPLETE FOR INITIAL CLAIMS ONLY)  Patient's Last Name, First Name, M.I.  YOB: 1980  Simran Fish                        Provider's Name  [facility] Medical Record No.  [id number]                               Onset Date:  07/13/22   Start of Care Date:  07/13/22      Type:     _X_PT   ___OT   ___SLP Medical Diagnosis:  Ankle fracture                        PT Diagnosis:  Ankle fracture   Visits from SOC:  1   _________________________________________________________________________________  Plan of Treatment/Functional Goals    Planned Interventions: bed mobility training, gait training, stair training, strengthening     Goals: See Physical Therapy Goals on Care Plan in "[employer]" electronic health record.    Therapy Frequency: 2x/day  Predicted Duration of Therapy Intervention: 07/15/22  _________________________________________________________________________________    I CERTIFY THE NEED FOR THESE SERVICES FURNISHED UNDER        THIS PLAN OF TREATMENT AND WHILE UNDER MY CARE     (Physician co-signature of this document indicates review and certification of the therapy plan).              Certification date from: 07/14/22, Certification date to: 07/15/22    Referring Physician: Kaelyn Saez PA            Initial Assessment        See Physical Therapy evaluation dated 07/13/22 in Epic electronic health record.

## 2022-07-14 NOTE — PROGRESS NOTES
07/14/22 1000   Quick Adds   Type of Visit Initial PT Evaluation   Living Environment   People in Home child(naty), dependent   Current Living Arrangements house   Home Accessibility stairs within home   Number of Stairs, Within Home, Primary seven   Stair Railings, Within Home, Primary railings safe and in good condition   Transportation Anticipated family or friend will provide   Self-Care   Usual Activity Tolerance good   Current Activity Tolerance fair   Equipment Currently Used at Home none   Fall history within last six months yes   Number of times patient has fallen within last six months 1   Activity/Exercise/Self-Care Comment Crutches issued by ortho   General Information   Onset of Illness/Injury or Date of Surgery 07/13/22   Referring Physician Kaelyn Saez PA   Pertinent History of Current Problem (include personal factors and/or comorbidities that impact the POC) Open reduction internal fixation of left Lisfranc injury, Second and third tarsometatarsal arthrodesis   Existing Precautions/Restrictions fall   Weight-Bearing Status - LLE nonweight-bearing   Cognition   Affect/Mental Status (Cognition) WNL   Orientation Status (Cognition) oriented x 3   Follows Commands (Cognition) WNL   Safety Deficit (Cognition) at risk behavior observed   Range of Motion (ROM)   Range of Motion ROM is WFL   ROM Comment except L ankle   Strength (Manual Muscle Testing)   Strength (Manual Muscle Testing) strength is WNL   Bed Mobility   Comment, (Bed Mobility) SBA   Transfers   Comment, (Transfers) CGA   Gait/Stairs (Locomotion)   Comment, (Gait/Stairs) min A w crutches, L NWB step to pattern, single pt gait   Balance   Balance Comments impaired L NWB   Clinical Impression   Criteria for Skilled Therapeutic Intervention Yes, treatment indicated   PT Diagnosis (PT) Ankle fracture   Influenced by the following impairments impaired gait, dec indep transfers   Functional limitations due to impairments impaired  mobility   Clinical Presentation (PT Evaluation Complexity) Evolving/Changing   Clinical Presentation Rationale clinical assessment   Clinical Decision Making (Complexity) low complexity   Planned Therapy Interventions (PT) bed mobility training;gait training;stair training;strengthening   Anticipated Equipment Needs at Discharge (PT) shower chair   Risk & Benefits of therapy have been explained evaluation/treatment results reviewed;care plan/treatment goals reviewed;risks/benefits reviewed   PT Discharge Planning   PT Discharge Recommendation (DC Rec) home with assist;home with home care physical therapy   PT Rationale for DC Rec PT below baseline for mobility with NWB restrictions, min A for gait and stair navigation, will benefit from 2nd session with PT for stair instruction and from OT eval for ADLs, pt will need person to SBA for going up stairs, SW to assist with inc home support, pt with laundry on lower level, 4 children to care for etc. DC home with home PT   Therapy Certification   Start of care date 07/13/22   Certification date from 07/14/22   Certification date to 07/15/22   Medical Diagnosis Ankle fracture   Total Evaluation Time   Total Evaluation Time (Minutes) 10   Physical Therapy Goals   PT Frequency 2x/day   PT Predicted Duration/Target Date for Goal Attainment 07/15/22   PT Goals Bed Mobility;Transfers;Gait;Stairs   PT: Bed Mobility Independent   PT: Transfers Modified independent;Sit to/from stand;Assistive device;Within precautions   PT: Gait Modified independent;Crutches;25 feet;Within precautions   PT: Stairs Minimal assist;7 stairs;Rail on left

## 2022-07-14 NOTE — CONSULTS
Care Management Initial Consult    General Information  Assessment completed with: Patient, Simran  Type of CM/SW Visit: Offer D/C Planning    Primary Care Provider verified and updated as needed: Yes   Readmission within the last 30 days:     Reason for Consult: discharge planning  Advance Care Planning:          Communication Assessment  Patient's communication style: spoken language (English or Bilingual)    Hearing Difficulty or Deaf: no      Cognitive  Cognitive/Neuro/Behavioral: WDL  Level of Consciousness: alert  Arousal Level: opens eyes spontaneously  Orientation: oriented x 4             Living Environment:   People in home: child(naty), dependent, child(naty), adult     Current living Arrangements: apartment      Able to return to prior arrangements: yes     Family/Social Support:  Care provided by: self  Provides care for: no one  Marital Status: Single  Parent(s)          Description of Support System: Supportive, Involved    Support Assessment: Adequate family and caregiver support, Adequate social supports    Current Resources:   Patient receiving home care services: No  Equipment currently used at home: none    Employment/Financial:  Employment Status: employed part-time     Financial Concerns:     Referral to Financial Worker: No    Lifestyle & Psychosocial Needs:  Social Determinants of Health     Tobacco Use: Low Risk      Smoking Tobacco Use: Never Smoker     Smokeless Tobacco Use: Never Used   Alcohol Use: Not on file   Financial Resource Strain: Not on file   Food Insecurity: Not on file   Transportation Needs: Not on file   Physical Activity: Not on file   Stress: Not on file   Social Connections: Not on file   Intimate Partner Violence: Not on file   Depression: Not on file   Housing Stability: Not on file     Functional Status:  Prior to admission patient needed assistance: Patient was admitted 7/13. OR 7/13.     SW completed assessment with patient. She lives in [place] with her 5  "children ages 20, 16, 3, 2, and 8 months old. Her mother and sister live nearby. She reports that her mother assists as able but needs knee surgery and isn't able to provide a lot of physical assistance to her and her children. Pt was working prior to admission. She reports limited support and limited help from her adult children that live inside the home.     PT evaluated patient and recommend home therapy. Pt agreeable and feels she would benefit from home PT/OT. Confirmed address, PCP and insurance. Pt does not have a HC agency preference. Initial HC referral sent to [facility]--they are unable to accept.     TERRIE placed call to [facility] [phone], [phone]. They are able to accept referral for Home PT/OT. AVS updated.     Patient discussed that she is overwhelmed and depressed about her current situation and being \"laid up\" for 6 weeks. She had previously seen a therapist through [facility] and found it helpful. She would be interested in seeing a therapist again soon. TERRIE provided OP mental health resource sheet. Patient plans to call and arrange an appointment.     Patient requesting assistance with getting a PCA. SW provided [facility] assessment to call to request an assessment for determination of eligibility.     Pt also requesting assistance and resources for housing. TERRIE provided patient with [facility] Community Resource Guide, which includes housing resources.     Mental Health Status:  Mental Health Status: Current Concern       Chemical Dependency Status:  Chemical Dependency Status: No Current Concerns           Values/Beliefs:  Spiritual, Cultural Beliefs, Confucianist Practices, Values that affect care: Yes             Additional Information:  Plan: Home with [facility] PT/OT. Orders to be faxed to (f) [phone]. Pt reports that she will arrange a ride home at discharge through friends or family. SW will remain available for any " further needs.     MARGY Addison, [facility]   Inpatient Care Coordination  [facility]   [phone]

## 2022-07-14 NOTE — PROGRESS NOTES
ROOM # 207    Living Situation (if not independent, order SW consult): Home independently  Facility name:  : significant other    Activity level at baseline: Ind  Activity level on admit: A1    Who will be transporting you at discharge: Family    Patient registered to observation; given Patient Bill of Rights; given the opportunity to ask questions about observation status and their plan of care.  Patient has been oriented to the observation room, bathroom and call light is in place.    Discussed discharge goals and expectations with patient/family.

## 2022-07-14 NOTE — PLAN OF CARE
PRIMARY DIAGNOSIS: POST OP-METATARSAL FX  OUTPATIENT/OBSERVATION GOALS TO BE MET BEFORE DISCHARGE:  ADLs back to baseline: No    Activity and level of assistance: Assist of 1 with crutches     Pain status: Pain free.    Return to near baseline physical activity: No    Vitals are Temp: 97.6  F (36.4  C) Temp src: Oral BP: 137/82 Pulse: 99   Resp: 16 SpO2: 98 %.  Patient is Alert and Oriented x4. Pt is 1 person Assist with crutches.  Pt is a Regular diet.  Pt is denying pain.  Patient is Saline locked. Pt was tearful in their room while on phone. Pt stated that they feel depressed.  Crutches in the room but the patient still unable to use them.      Discharge Planner Nurse   Safe discharge environment identified: Yes  Barriers to discharge: Yes       Entered by: Luz Hernandez RN 07/14/2022 1:08 AM     Please review provider order for any additional goals.   Nurse to notify provider when observation goals have been met and patient is ready for discharge.

## 2022-07-14 NOTE — PLAN OF CARE
Patient's After Visit Summary was reviewed with patient.   Patient verbalized understanding of After Visit Summary, recommended follow up and was given an opportunity to ask questions.   Discharge medications sent home with patient/family: norco  Discharged with: family    OBSERVATION patient END time: 1900

## 2022-07-14 NOTE — PLAN OF CARE
PRIMARY DIAGNOSIS: POST OP-METATARSAL FX  OUTPATIENT/OBSERVATION GOALS TO BE MET BEFORE DISCHARGE:  1. ADLs back to baseline: No    2. Activity and level of assistance: Assist of 1 with crutches     3. Pain status: Pain free.    4. Return to near baseline physical activity: No    Vitals are Temp: 98.3  F (36.8  C) Temp src: Oral BP: 127/77 Pulse: 89   Resp: 16 SpO2: 97 %.  Patient is Alert and Oriented x4. Pt is 1 person Assist with crutches.  Pt is a Regular diet.  Pt is reporting pain of 7/10 on left foot- norco given. Headache resolved with tylenol.  Patient is Saline locked. Pt was tearful in their room while on phone. Pt stated that they feel depressed.  Crutches in the room but the patient still unable to use them.   Will cont to monitor.     Discharge Planner Nurse   Safe discharge environment identified: Yes  Barriers to discharge: Yes       Entered by: Luz Hernandez RN 07/14/2022      Please review provider order for any additional goals.   Nurse to notify provider when observation goals have been met and patient is ready for discharge.

## 2022-07-14 NOTE — PLAN OF CARE
"PRIMARY DIAGNOSIS: Post Surgical  OUTPATIENT/OBSERVATION GOALS TO BE MET BEFORE DISCHARGE:  ADLs back to baseline: No    Activity and level of assistance: Up with standby assistance.    Pain status: Improved-controlled with oral pain medications.    Return to near baseline physical activity: No     Discharge Planner Nurse   Safe discharge environment identified: Yes  Barriers to discharge: Yes       Entered by: Yaneth Tran RN 07/13/2022 10:40 PM  Pt AO x4. VSS on RA, LS clear, BS active. Pt up with A1 with crutches and gait belt, NWB to LLE. Pt tolerating clear liquid diet. IV SL. CMS intact. Currently 18 weeks pregnant. Plan: SW and PT consult.   /82 (BP Location: Left arm)   Pulse 99   Temp 97.6  F (36.4  C) (Oral)   Resp 16   Ht 1.6 m (5' 3\")   Wt 81.6 kg (180 lb)   LMP 04/14/2022 (Approximate)   SpO2 98%   BMI 31.89 kg/m    Please review provider order for any additional goals.   Nurse to notify provider when observation goals have been met and patient is ready for discharge.  "

## 2022-08-07 ENCOUNTER — APPOINTMENT (OUTPATIENT)
Dept: CT IMAGING | Facility: CLINIC | Age: 42
DRG: 818 | End: 2022-08-07
Attending: INTERNAL MEDICINE
Payer: COMMERCIAL

## 2022-08-07 ENCOUNTER — HOSPITAL ENCOUNTER (INPATIENT)
Facility: CLINIC | Age: 42
LOS: 5 days | Discharge: HOME HEALTH | DRG: 818 | End: 2022-08-12
Attending: INTERNAL MEDICINE | Admitting: INTERNAL MEDICINE
Payer: COMMERCIAL

## 2022-08-07 DIAGNOSIS — M86.072 ACUTE HEMATOGENOUS OSTEOMYELITIS OF LEFT FOOT (H): ICD-10-CM

## 2022-08-07 DIAGNOSIS — S92.322A DISPLACED FRACTURE OF SECOND METATARSAL BONE, LEFT FOOT, INITIAL ENCOUNTER FOR CLOSED FRACTURE: ICD-10-CM

## 2022-08-07 DIAGNOSIS — T81.42XA INFECTION OF DEEP INCISIONAL SURGICAL SITE AFTER PROCEDURE, INITIAL ENCOUNTER: Primary | ICD-10-CM

## 2022-08-07 DIAGNOSIS — B37.31 CANDIDIASIS OF VAGINA: ICD-10-CM

## 2022-08-07 LAB
ALBUMIN SERPL BCG-MCNC: 3.4 G/DL (ref 3.5–5.2)
ALBUMIN UR-MCNC: 20 MG/DL
ALP SERPL-CCNC: 68 U/L (ref 35–104)
ALT SERPL W P-5'-P-CCNC: 9 U/L (ref 10–35)
ANION GAP SERPL CALCULATED.3IONS-SCNC: 8 MMOL/L (ref 7–15)
APPEARANCE UR: ABNORMAL
AST SERPL W P-5'-P-CCNC: 13 U/L (ref 10–35)
BACTERIA #/AREA URNS HPF: ABNORMAL /HPF
BASOPHILS # BLD AUTO: 0 10E3/UL (ref 0–0.2)
BASOPHILS NFR BLD AUTO: 0 %
BILIRUB SERPL-MCNC: 0.2 MG/DL
BILIRUB UR QL STRIP: NEGATIVE
BUN SERPL-MCNC: 6.7 MG/DL (ref 6–20)
CALCIUM SERPL-MCNC: 8.4 MG/DL (ref 8.6–10)
CHLORIDE SERPL-SCNC: 103 MMOL/L (ref 98–107)
COLOR UR AUTO: YELLOW
CREAT SERPL-MCNC: 0.6 MG/DL (ref 0.51–0.95)
DEPRECATED HCO3 PLAS-SCNC: 23 MMOL/L (ref 22–29)
EOSINOPHIL # BLD AUTO: 0.4 10E3/UL (ref 0–0.7)
EOSINOPHIL NFR BLD AUTO: 4 %
ERYTHROCYTE [DISTWIDTH] IN BLOOD BY AUTOMATED COUNT: 15.5 % (ref 10–15)
GFR SERPL CREATININE-BSD FRML MDRD: >90 ML/MIN/1.73M2
GLUCOSE SERPL-MCNC: 84 MG/DL (ref 70–99)
GLUCOSE UR STRIP-MCNC: NEGATIVE MG/DL
HCT VFR BLD AUTO: 29.7 % (ref 35–47)
HGB BLD-MCNC: 9.1 G/DL (ref 11.7–15.7)
HGB UR QL STRIP: NEGATIVE
IMM GRANULOCYTES # BLD: 0.1 10E3/UL
IMM GRANULOCYTES NFR BLD: 1 %
KETONES UR STRIP-MCNC: ABNORMAL MG/DL
LACTATE SERPL-SCNC: 1 MMOL/L (ref 0.7–2)
LEUKOCYTE ESTERASE UR QL STRIP: ABNORMAL
LYMPHOCYTES # BLD AUTO: 2.2 10E3/UL (ref 0.8–5.3)
LYMPHOCYTES NFR BLD AUTO: 22 %
MCH RBC QN AUTO: 26.8 PG (ref 26.5–33)
MCHC RBC AUTO-ENTMCNC: 30.6 G/DL (ref 31.5–36.5)
MCV RBC AUTO: 87 FL (ref 78–100)
MONOCYTES # BLD AUTO: 0.8 10E3/UL (ref 0–1.3)
MONOCYTES NFR BLD AUTO: 8 %
MUCOUS THREADS #/AREA URNS LPF: PRESENT /LPF
NEUTROPHILS # BLD AUTO: 6.8 10E3/UL (ref 1.6–8.3)
NEUTROPHILS NFR BLD AUTO: 65 %
NITRATE UR QL: POSITIVE
NRBC # BLD AUTO: 0 10E3/UL
NRBC BLD AUTO-RTO: 0 /100
PH UR STRIP: 7 [PH] (ref 5–7)
PLATELET # BLD AUTO: 342 10E3/UL (ref 150–450)
POTASSIUM SERPL-SCNC: 3.7 MMOL/L (ref 3.4–5.3)
PROT SERPL-MCNC: 6.8 G/DL (ref 6.4–8.3)
RBC # BLD AUTO: 3.4 10E6/UL (ref 3.8–5.2)
RBC URINE: 6 /HPF
SODIUM SERPL-SCNC: 134 MMOL/L (ref 136–145)
SP GR UR STRIP: 1.03 (ref 1–1.03)
SQUAMOUS EPITHELIAL: 2 /HPF
UROBILINOGEN UR STRIP-MCNC: NORMAL MG/DL
WBC # BLD AUTO: 10.3 10E3/UL (ref 4–11)
WBC URINE: 16 /HPF

## 2022-08-07 PROCEDURE — 99285 EMERGENCY DEPT VISIT HI MDM: CPT | Mod: 25

## 2022-08-07 PROCEDURE — 83605 ASSAY OF LACTIC ACID: CPT | Performed by: INTERNAL MEDICINE

## 2022-08-07 PROCEDURE — 99223 1ST HOSP IP/OBS HIGH 75: CPT | Mod: AI | Performed by: INTERNAL MEDICINE

## 2022-08-07 PROCEDURE — 85025 COMPLETE CBC W/AUTO DIFF WBC: CPT | Performed by: INTERNAL MEDICINE

## 2022-08-07 PROCEDURE — 96365 THER/PROPH/DIAG IV INF INIT: CPT | Mod: 59

## 2022-08-07 PROCEDURE — 250N000011 HC RX IP 250 OP 636: Performed by: INTERNAL MEDICINE

## 2022-08-07 PROCEDURE — 86140 C-REACTIVE PROTEIN: CPT | Performed by: INTERNAL MEDICINE

## 2022-08-07 PROCEDURE — 250N000009 HC RX 250: Performed by: INTERNAL MEDICINE

## 2022-08-07 PROCEDURE — 81001 URINALYSIS AUTO W/SCOPE: CPT | Performed by: INTERNAL MEDICINE

## 2022-08-07 PROCEDURE — 93005 ELECTROCARDIOGRAM TRACING: CPT

## 2022-08-07 PROCEDURE — 87086 URINE CULTURE/COLONY COUNT: CPT | Performed by: INTERNAL MEDICINE

## 2022-08-07 PROCEDURE — 120N000001 HC R&B MED SURG/OB

## 2022-08-07 PROCEDURE — C9803 HOPD COVID-19 SPEC COLLECT: HCPCS

## 2022-08-07 PROCEDURE — 80053 COMPREHEN METABOLIC PANEL: CPT | Performed by: INTERNAL MEDICINE

## 2022-08-07 PROCEDURE — 36415 COLL VENOUS BLD VENIPUNCTURE: CPT | Performed by: INTERNAL MEDICINE

## 2022-08-07 PROCEDURE — 87040 BLOOD CULTURE FOR BACTERIA: CPT | Performed by: INTERNAL MEDICINE

## 2022-08-07 PROCEDURE — U0003 INFECTIOUS AGENT DETECTION BY NUCLEIC ACID (DNA OR RNA); SEVERE ACUTE RESPIRATORY SYNDROME CORONAVIRUS 2 (SARS-COV-2) (CORONAVIRUS DISEASE [COVID-19]), AMPLIFIED PROBE TECHNIQUE, MAKING USE OF HIGH THROUGHPUT TECHNOLOGIES AS DESCRIBED BY CMS-2020-01-R: HCPCS | Performed by: INTERNAL MEDICINE

## 2022-08-07 PROCEDURE — 73701 CT LOWER EXTREMITY W/DYE: CPT | Mod: LT

## 2022-08-07 RX ORDER — IOPAMIDOL 755 MG/ML
500 INJECTION, SOLUTION INTRAVASCULAR ONCE
Status: COMPLETED | OUTPATIENT
Start: 2022-08-07 | End: 2022-08-07

## 2022-08-07 RX ADMIN — IOPAMIDOL 100 ML: 755 INJECTION, SOLUTION INTRAVENOUS at 22:43

## 2022-08-07 RX ADMIN — TAZOBACTAM SODIUM AND PIPERACILLIN SODIUM 4.5 G: 500; 4 INJECTION, SOLUTION INTRAVENOUS at 21:33

## 2022-08-07 RX ADMIN — SODIUM CHLORIDE 100 ML: 9 INJECTION, SOLUTION INTRAVENOUS at 22:43

## 2022-08-07 NOTE — LETTER
Referral for resumption of homecare PT/OT/RN. Patient will likely discharge 8/11/2022.       MARGY Atkinson, [facility]  Inpatient Care Coordination  Ortho/Spine Unit  [phone]

## 2022-08-08 ENCOUNTER — ANESTHESIA (OUTPATIENT)
Dept: SURGERY | Facility: CLINIC | Age: 42
DRG: 818 | End: 2022-08-08
Payer: COMMERCIAL

## 2022-08-08 ENCOUNTER — ANESTHESIA EVENT (OUTPATIENT)
Dept: SURGERY | Facility: CLINIC | Age: 42
DRG: 818 | End: 2022-08-08
Payer: COMMERCIAL

## 2022-08-08 LAB
ANION GAP SERPL CALCULATED.3IONS-SCNC: 7 MMOL/L (ref 7–15)
BUN SERPL-MCNC: 5.5 MG/DL (ref 6–20)
CALCIUM SERPL-MCNC: 7.9 MG/DL (ref 8.6–10)
CHLORIDE SERPL-SCNC: 108 MMOL/L (ref 98–107)
CREAT SERPL-MCNC: 0.58 MG/DL (ref 0.51–0.95)
CRP SERPL-MCNC: 18.89 MG/L
DEPRECATED HCO3 PLAS-SCNC: 21 MMOL/L (ref 22–29)
ERYTHROCYTE [DISTWIDTH] IN BLOOD BY AUTOMATED COUNT: 15.4 % (ref 10–15)
GFR SERPL CREATININE-BSD FRML MDRD: >90 ML/MIN/1.73M2
GLUCOSE SERPL-MCNC: 88 MG/DL (ref 70–99)
HCT VFR BLD AUTO: 29 % (ref 35–47)
HGB BLD-MCNC: 8.8 G/DL (ref 11.7–15.7)
MCH RBC QN AUTO: 26.7 PG (ref 26.5–33)
MCHC RBC AUTO-ENTMCNC: 30.3 G/DL (ref 31.5–36.5)
MCV RBC AUTO: 88 FL (ref 78–100)
PLATELET # BLD AUTO: 315 10E3/UL (ref 150–450)
POTASSIUM SERPL-SCNC: 4 MMOL/L (ref 3.4–5.3)
RBC # BLD AUTO: 3.3 10E6/UL (ref 3.8–5.2)
SARS-COV-2 RNA RESP QL NAA+PROBE: NEGATIVE
SODIUM SERPL-SCNC: 136 MMOL/L (ref 136–145)
WBC # BLD AUTO: 7.9 10E3/UL (ref 4–11)

## 2022-08-08 PROCEDURE — 250N000011 HC RX IP 250 OP 636: Performed by: INTERNAL MEDICINE

## 2022-08-08 PROCEDURE — 258N000003 HC RX IP 258 OP 636: Performed by: INTERNAL MEDICINE

## 2022-08-08 PROCEDURE — 250N000013 HC RX MED GY IP 250 OP 250 PS 637: Performed by: HOSPITALIST

## 2022-08-08 PROCEDURE — 258N000003 HC RX IP 258 OP 636: Performed by: NURSE ANESTHETIST, CERTIFIED REGISTERED

## 2022-08-08 PROCEDURE — 250N000009 HC RX 250: Performed by: ORTHOPAEDIC SURGERY

## 2022-08-08 PROCEDURE — 258N000003 HC RX IP 258 OP 636: Performed by: ANESTHESIOLOGY

## 2022-08-08 PROCEDURE — 250N000011 HC RX IP 250 OP 636

## 2022-08-08 PROCEDURE — 250N000013 HC RX MED GY IP 250 OP 250 PS 637

## 2022-08-08 PROCEDURE — 36415 COLL VENOUS BLD VENIPUNCTURE: CPT | Performed by: INTERNAL MEDICINE

## 2022-08-08 PROCEDURE — 250N000013 HC RX MED GY IP 250 OP 250 PS 637: Performed by: ORTHOPAEDIC SURGERY

## 2022-08-08 PROCEDURE — 0JBR0ZZ EXCISION OF LEFT FOOT SUBCUTANEOUS TISSUE AND FASCIA, OPEN APPROACH: ICD-10-PCS | Performed by: ORTHOPAEDIC SURGERY

## 2022-08-08 PROCEDURE — 87077 CULTURE AEROBIC IDENTIFY: CPT | Performed by: ORTHOPAEDIC SURGERY

## 2022-08-08 PROCEDURE — 120N000001 HC R&B MED SURG/OB

## 2022-08-08 PROCEDURE — 360N000076 HC SURGERY LEVEL 3, PER MIN: Performed by: ORTHOPAEDIC SURGERY

## 2022-08-08 PROCEDURE — 250N000011 HC RX IP 250 OP 636: Performed by: ANESTHESIOLOGY

## 2022-08-08 PROCEDURE — 99232 SBSQ HOSP IP/OBS MODERATE 35: CPT | Performed by: INTERNAL MEDICINE

## 2022-08-08 PROCEDURE — 85027 COMPLETE CBC AUTOMATED: CPT | Performed by: INTERNAL MEDICINE

## 2022-08-08 PROCEDURE — 82310 ASSAY OF CALCIUM: CPT | Performed by: INTERNAL MEDICINE

## 2022-08-08 PROCEDURE — 250N000013 HC RX MED GY IP 250 OP 250 PS 637: Performed by: ANESTHESIOLOGY

## 2022-08-08 PROCEDURE — 999N000141 HC STATISTIC PRE-PROCEDURE NURSING ASSESSMENT: Performed by: ORTHOPAEDIC SURGERY

## 2022-08-08 PROCEDURE — 370N000017 HC ANESTHESIA TECHNICAL FEE, PER MIN: Performed by: ORTHOPAEDIC SURGERY

## 2022-08-08 PROCEDURE — 250N000009 HC RX 250: Performed by: NURSE ANESTHETIST, CERTIFIED REGISTERED

## 2022-08-08 PROCEDURE — 710N000009 HC RECOVERY PHASE 1, LEVEL 1, PER MIN: Performed by: ORTHOPAEDIC SURGERY

## 2022-08-08 PROCEDURE — 99222 1ST HOSP IP/OBS MODERATE 55: CPT | Performed by: INTERNAL MEDICINE

## 2022-08-08 PROCEDURE — 272N000001 HC OR GENERAL SUPPLY STERILE: Performed by: ORTHOPAEDIC SURGERY

## 2022-08-08 PROCEDURE — 258N000003 HC RX IP 258 OP 636

## 2022-08-08 PROCEDURE — 250N000011 HC RX IP 250 OP 636: Performed by: NURSE ANESTHETIST, CERTIFIED REGISTERED

## 2022-08-08 RX ORDER — LIDOCAINE 40 MG/G
CREAM TOPICAL
Status: DISCONTINUED | OUTPATIENT
Start: 2022-08-08 | End: 2022-08-12 | Stop reason: HOSPADM

## 2022-08-08 RX ORDER — LIDOCAINE 40 MG/G
CREAM TOPICAL
Status: DISCONTINUED | OUTPATIENT
Start: 2022-08-08 | End: 2022-08-08 | Stop reason: HOSPADM

## 2022-08-08 RX ORDER — HYDROMORPHONE HCL IN WATER/PF 6 MG/30 ML
0.2 PATIENT CONTROLLED ANALGESIA SYRINGE INTRAVENOUS
Status: DISCONTINUED | OUTPATIENT
Start: 2022-08-08 | End: 2022-08-12 | Stop reason: HOSPADM

## 2022-08-08 RX ORDER — SODIUM CHLORIDE, SODIUM LACTATE, POTASSIUM CHLORIDE, CALCIUM CHLORIDE 600; 310; 30; 20 MG/100ML; MG/100ML; MG/100ML; MG/100ML
INJECTION, SOLUTION INTRAVENOUS CONTINUOUS
Status: DISCONTINUED | OUTPATIENT
Start: 2022-08-08 | End: 2022-08-08 | Stop reason: HOSPADM

## 2022-08-08 RX ORDER — FENTANYL CITRATE 50 UG/ML
50 INJECTION, SOLUTION INTRAMUSCULAR; INTRAVENOUS
Status: DISCONTINUED | OUTPATIENT
Start: 2022-08-08 | End: 2022-08-08 | Stop reason: HOSPADM

## 2022-08-08 RX ORDER — ACETAMINOPHEN 325 MG/1
975 TABLET ORAL EVERY 8 HOURS
Status: COMPLETED | OUTPATIENT
Start: 2022-08-08 | End: 2022-08-11

## 2022-08-08 RX ORDER — ACETAMINOPHEN 325 MG/1
975 TABLET ORAL ONCE
Status: COMPLETED | OUTPATIENT
Start: 2022-08-08 | End: 2022-08-08

## 2022-08-08 RX ORDER — ONDANSETRON 4 MG/1
4 TABLET, ORALLY DISINTEGRATING ORAL EVERY 30 MIN PRN
Status: DISCONTINUED | OUTPATIENT
Start: 2022-08-08 | End: 2022-08-08 | Stop reason: HOSPADM

## 2022-08-08 RX ORDER — HYDROMORPHONE HYDROCHLORIDE 1 MG/ML
0.3 INJECTION, SOLUTION INTRAMUSCULAR; INTRAVENOUS; SUBCUTANEOUS
Status: DISCONTINUED | OUTPATIENT
Start: 2022-08-08 | End: 2022-08-08

## 2022-08-08 RX ORDER — FENTANYL CITRATE 50 UG/ML
50 INJECTION, SOLUTION INTRAMUSCULAR; INTRAVENOUS EVERY 5 MIN PRN
Status: DISCONTINUED | OUTPATIENT
Start: 2022-08-08 | End: 2022-08-08 | Stop reason: HOSPADM

## 2022-08-08 RX ORDER — MEPERIDINE HYDROCHLORIDE 25 MG/ML
12.5 INJECTION INTRAMUSCULAR; INTRAVENOUS; SUBCUTANEOUS
Status: DISCONTINUED | OUTPATIENT
Start: 2022-08-08 | End: 2022-08-08 | Stop reason: HOSPADM

## 2022-08-08 RX ORDER — ACETAMINOPHEN 500 MG
500-1000 TABLET ORAL EVERY 6 HOURS PRN
Status: DISCONTINUED | OUTPATIENT
Start: 2022-08-08 | End: 2022-08-09

## 2022-08-08 RX ORDER — ONDANSETRON 2 MG/ML
4 INJECTION INTRAMUSCULAR; INTRAVENOUS EVERY 6 HOURS PRN
Status: DISCONTINUED | OUTPATIENT
Start: 2022-08-08 | End: 2022-08-12 | Stop reason: HOSPADM

## 2022-08-08 RX ORDER — LIDOCAINE 40 MG/G
CREAM TOPICAL
Status: DISCONTINUED | OUTPATIENT
Start: 2022-08-08 | End: 2022-08-08

## 2022-08-08 RX ORDER — OXYCODONE HYDROCHLORIDE 5 MG/1
10 TABLET ORAL EVERY 4 HOURS PRN
Status: DISCONTINUED | OUTPATIENT
Start: 2022-08-08 | End: 2022-08-12 | Stop reason: HOSPADM

## 2022-08-08 RX ORDER — HYDRALAZINE HYDROCHLORIDE 20 MG/ML
2.5-5 INJECTION INTRAMUSCULAR; INTRAVENOUS EVERY 10 MIN PRN
Status: CANCELLED | OUTPATIENT
Start: 2022-08-08

## 2022-08-08 RX ORDER — POLYETHYLENE GLYCOL 3350 17 G/17G
17 POWDER, FOR SOLUTION ORAL DAILY
Status: DISCONTINUED | OUTPATIENT
Start: 2022-08-09 | End: 2022-08-12 | Stop reason: HOSPADM

## 2022-08-08 RX ORDER — PROCHLORPERAZINE MALEATE 10 MG
10 TABLET ORAL EVERY 6 HOURS PRN
Status: DISCONTINUED | OUTPATIENT
Start: 2022-08-08 | End: 2022-08-08

## 2022-08-08 RX ORDER — HYDROMORPHONE HCL IN WATER/PF 6 MG/30 ML
0.4 PATIENT CONTROLLED ANALGESIA SYRINGE INTRAVENOUS
Status: DISCONTINUED | OUTPATIENT
Start: 2022-08-08 | End: 2022-08-12 | Stop reason: HOSPADM

## 2022-08-08 RX ORDER — BISACODYL 10 MG
10 SUPPOSITORY, RECTAL RECTAL DAILY PRN
Status: DISCONTINUED | OUTPATIENT
Start: 2022-08-08 | End: 2022-08-12 | Stop reason: HOSPADM

## 2022-08-08 RX ORDER — CEFAZOLIN SODIUM/WATER 2 G/20 ML
2 SYRINGE (ML) INTRAVENOUS
Status: DISCONTINUED | OUTPATIENT
Start: 2022-08-08 | End: 2022-08-08 | Stop reason: HOSPADM

## 2022-08-08 RX ORDER — SODIUM CHLORIDE, SODIUM LACTATE, POTASSIUM CHLORIDE, CALCIUM CHLORIDE 600; 310; 30; 20 MG/100ML; MG/100ML; MG/100ML; MG/100ML
INJECTION, SOLUTION INTRAVENOUS CONTINUOUS
Status: DISCONTINUED | OUTPATIENT
Start: 2022-08-08 | End: 2022-08-09

## 2022-08-08 RX ORDER — MAGNESIUM HYDROXIDE 1200 MG/15ML
LIQUID ORAL PRN
Status: DISCONTINUED | OUTPATIENT
Start: 2022-08-08 | End: 2022-08-08 | Stop reason: HOSPADM

## 2022-08-08 RX ORDER — ALBUTEROL SULFATE 90 UG/1
1-2 AEROSOL, METERED RESPIRATORY (INHALATION) EVERY 4 HOURS PRN
Status: DISCONTINUED | OUTPATIENT
Start: 2022-08-08 | End: 2022-08-12 | Stop reason: HOSPADM

## 2022-08-08 RX ORDER — CEFAZOLIN SODIUM/WATER 2 G/20 ML
2 SYRINGE (ML) INTRAVENOUS SEE ADMIN INSTRUCTIONS
Status: DISCONTINUED | OUTPATIENT
Start: 2022-08-08 | End: 2022-08-08 | Stop reason: HOSPADM

## 2022-08-08 RX ORDER — DIPHENHYDRAMINE HCL 25 MG
25 CAPSULE ORAL
Status: COMPLETED | OUTPATIENT
Start: 2022-08-08 | End: 2022-08-08

## 2022-08-08 RX ORDER — LIDOCAINE HYDROCHLORIDE 10 MG/ML
INJECTION, SOLUTION INFILTRATION; PERINEURAL PRN
Status: DISCONTINUED | OUTPATIENT
Start: 2022-08-08 | End: 2022-08-08

## 2022-08-08 RX ORDER — NALOXONE HYDROCHLORIDE 0.4 MG/ML
0.4 INJECTION, SOLUTION INTRAMUSCULAR; INTRAVENOUS; SUBCUTANEOUS
Status: DISCONTINUED | OUTPATIENT
Start: 2022-08-08 | End: 2022-08-12 | Stop reason: HOSPADM

## 2022-08-08 RX ORDER — OXYCODONE HYDROCHLORIDE 5 MG/1
5 TABLET ORAL EVERY 4 HOURS PRN
Status: DISCONTINUED | OUTPATIENT
Start: 2022-08-08 | End: 2022-08-12 | Stop reason: HOSPADM

## 2022-08-08 RX ORDER — ACETAMINOPHEN 325 MG/1
650 TABLET ORAL EVERY 4 HOURS PRN
Status: DISCONTINUED | OUTPATIENT
Start: 2022-08-11 | End: 2022-08-12 | Stop reason: HOSPADM

## 2022-08-08 RX ORDER — OXYCODONE HYDROCHLORIDE 5 MG/1
5 TABLET ORAL EVERY 4 HOURS PRN
Status: DISCONTINUED | OUTPATIENT
Start: 2022-08-08 | End: 2022-08-08 | Stop reason: HOSPADM

## 2022-08-08 RX ORDER — HYDROMORPHONE HCL IN WATER/PF 6 MG/30 ML
0.4 PATIENT CONTROLLED ANALGESIA SYRINGE INTRAVENOUS EVERY 5 MIN PRN
Status: CANCELLED | OUTPATIENT
Start: 2022-08-08

## 2022-08-08 RX ORDER — ONDANSETRON 2 MG/ML
INJECTION INTRAMUSCULAR; INTRAVENOUS PRN
Status: DISCONTINUED | OUTPATIENT
Start: 2022-08-08 | End: 2022-08-08

## 2022-08-08 RX ORDER — NALOXONE HYDROCHLORIDE 0.4 MG/ML
0.2 INJECTION, SOLUTION INTRAMUSCULAR; INTRAVENOUS; SUBCUTANEOUS
Status: DISCONTINUED | OUTPATIENT
Start: 2022-08-08 | End: 2022-08-12 | Stop reason: HOSPADM

## 2022-08-08 RX ORDER — FENTANYL CITRATE 50 UG/ML
INJECTION, SOLUTION INTRAMUSCULAR; INTRAVENOUS PRN
Status: DISCONTINUED | OUTPATIENT
Start: 2022-08-08 | End: 2022-08-08

## 2022-08-08 RX ORDER — PROCHLORPERAZINE MALEATE 5 MG
10 TABLET ORAL EVERY 6 HOURS PRN
Status: DISCONTINUED | OUTPATIENT
Start: 2022-08-08 | End: 2022-08-12 | Stop reason: HOSPADM

## 2022-08-08 RX ORDER — FLUCONAZOLE 50 MG/1
50 TABLET ORAL DAILY
Status: DISCONTINUED | OUTPATIENT
Start: 2022-08-08 | End: 2022-08-09

## 2022-08-08 RX ORDER — AMOXICILLIN 250 MG
1 CAPSULE ORAL 2 TIMES DAILY
Status: DISCONTINUED | OUTPATIENT
Start: 2022-08-08 | End: 2022-08-12 | Stop reason: HOSPADM

## 2022-08-08 RX ORDER — PROCHLORPERAZINE 25 MG
25 SUPPOSITORY, RECTAL RECTAL EVERY 12 HOURS PRN
Status: DISCONTINUED | OUTPATIENT
Start: 2022-08-08 | End: 2022-08-12 | Stop reason: HOSPADM

## 2022-08-08 RX ORDER — ONDANSETRON 4 MG/1
4 TABLET, ORALLY DISINTEGRATING ORAL EVERY 6 HOURS PRN
Status: DISCONTINUED | OUTPATIENT
Start: 2022-08-08 | End: 2022-08-12 | Stop reason: HOSPADM

## 2022-08-08 RX ORDER — ONDANSETRON 2 MG/ML
4 INJECTION INTRAMUSCULAR; INTRAVENOUS EVERY 30 MIN PRN
Status: DISCONTINUED | OUTPATIENT
Start: 2022-08-08 | End: 2022-08-08 | Stop reason: HOSPADM

## 2022-08-08 RX ORDER — DEXTROSE MONOHYDRATE, SODIUM CHLORIDE, AND POTASSIUM CHLORIDE 50; 1.49; 4.5 G/1000ML; G/1000ML; G/1000ML
INJECTION, SOLUTION INTRAVENOUS CONTINUOUS
Status: DISCONTINUED | OUTPATIENT
Start: 2022-08-08 | End: 2022-08-09

## 2022-08-08 RX ORDER — METOPROLOL TARTRATE 1 MG/ML
1-2 INJECTION, SOLUTION INTRAVENOUS EVERY 5 MIN PRN
Status: CANCELLED | OUTPATIENT
Start: 2022-08-08

## 2022-08-08 RX ORDER — PROPOFOL 10 MG/ML
INJECTION, EMULSION INTRAVENOUS PRN
Status: DISCONTINUED | OUTPATIENT
Start: 2022-08-08 | End: 2022-08-08

## 2022-08-08 RX ADMIN — FENTANYL CITRATE 100 MCG: 50 INJECTION, SOLUTION INTRAMUSCULAR; INTRAVENOUS at 09:14

## 2022-08-08 RX ADMIN — SODIUM CHLORIDE, POTASSIUM CHLORIDE, SODIUM LACTATE AND CALCIUM CHLORIDE: 600; 310; 30; 20 INJECTION, SOLUTION INTRAVENOUS at 08:16

## 2022-08-08 RX ADMIN — TAZOBACTAM SODIUM AND PIPERACILLIN SODIUM 3.38 G: 375; 3 INJECTION, SOLUTION INTRAVENOUS at 06:29

## 2022-08-08 RX ADMIN — ACETAMINOPHEN 975 MG: 325 TABLET, FILM COATED ORAL at 08:17

## 2022-08-08 RX ADMIN — TAZOBACTAM SODIUM AND PIPERACILLIN SODIUM 3.38 G: 375; 3 INJECTION, SOLUTION INTRAVENOUS at 18:30

## 2022-08-08 RX ADMIN — PHENYLEPHRINE HYDROCHLORIDE 100 MCG: 10 INJECTION INTRAVENOUS at 09:32

## 2022-08-08 RX ADMIN — POTASSIUM CHLORIDE, DEXTROSE MONOHYDRATE AND SODIUM CHLORIDE: 150; 5; 450 INJECTION, SOLUTION INTRAVENOUS at 07:20

## 2022-08-08 RX ADMIN — DIPHENHYDRAMINE HYDROCHLORIDE 25 MG: 25 CAPSULE ORAL at 04:51

## 2022-08-08 RX ADMIN — LIDOCAINE HYDROCHLORIDE 50 MG: 10 INJECTION, SOLUTION INFILTRATION; PERINEURAL at 09:14

## 2022-08-08 RX ADMIN — TAZOBACTAM SODIUM AND PIPERACILLIN SODIUM 3.38 G: 375; 3 INJECTION, SOLUTION INTRAVENOUS at 13:36

## 2022-08-08 RX ADMIN — VANCOMYCIN HYDROCHLORIDE 1250 MG: 10 INJECTION, POWDER, LYOPHILIZED, FOR SOLUTION INTRAVENOUS at 15:16

## 2022-08-08 RX ADMIN — HYDROMORPHONE HYDROCHLORIDE 0.2 MG: 0.2 INJECTION, SOLUTION INTRAMUSCULAR; INTRAVENOUS; SUBCUTANEOUS at 11:44

## 2022-08-08 RX ADMIN — FENTANYL CITRATE 50 MCG: 50 INJECTION, SOLUTION INTRAMUSCULAR; INTRAVENOUS at 10:19

## 2022-08-08 RX ADMIN — PROPOFOL 200 MG: 10 INJECTION, EMULSION INTRAVENOUS at 09:14

## 2022-08-08 RX ADMIN — Medication 100 MG: at 09:14

## 2022-08-08 RX ADMIN — ONDANSETRON HYDROCHLORIDE 4 MG: 2 INJECTION, SOLUTION INTRAVENOUS at 09:55

## 2022-08-08 RX ADMIN — OXYCODONE HYDROCHLORIDE 5 MG: 5 TABLET ORAL at 10:37

## 2022-08-08 RX ADMIN — PHENYLEPHRINE HYDROCHLORIDE 100 MCG: 10 INJECTION INTRAVENOUS at 09:26

## 2022-08-08 RX ADMIN — FENTANYL CITRATE 100 MCG: 50 INJECTION, SOLUTION INTRAMUSCULAR; INTRAVENOUS at 09:40

## 2022-08-08 RX ADMIN — SENNOSIDES AND DOCUSATE SODIUM 1 TABLET: 50; 8.6 TABLET ORAL at 11:44

## 2022-08-08 RX ADMIN — VANCOMYCIN HYDROCHLORIDE 1750 MG: 5 INJECTION, POWDER, LYOPHILIZED, FOR SOLUTION INTRAVENOUS at 02:59

## 2022-08-08 RX ADMIN — FENTANYL CITRATE 50 MCG: 50 INJECTION, SOLUTION INTRAMUSCULAR; INTRAVENOUS at 10:46

## 2022-08-08 RX ADMIN — OXYCODONE HYDROCHLORIDE 10 MG: 5 TABLET ORAL at 14:37

## 2022-08-08 RX ADMIN — FENTANYL CITRATE 50 MCG: 50 INJECTION, SOLUTION INTRAMUSCULAR; INTRAVENOUS at 10:23

## 2022-08-08 RX ADMIN — OXYCODONE HYDROCHLORIDE 10 MG: 5 TABLET ORAL at 19:58

## 2022-08-08 RX ADMIN — ACETAMINOPHEN 975 MG: 325 TABLET ORAL at 16:37

## 2022-08-08 RX ADMIN — SENNOSIDES AND DOCUSATE SODIUM 1 TABLET: 50; 8.6 TABLET ORAL at 19:58

## 2022-08-08 ASSESSMENT — ACTIVITIES OF DAILY LIVING (ADL)
ADLS_ACUITY_SCORE: 30
TRANSFERRING: 0-->ASSISTANCE NEEDED (DEVELOPMENTALLY APPROPRIATE)
DRESSING/BATHING: BATHING DIFFICULTY, ASSISTANCE 1 PERSON
DIFFICULTY_EATING/SWALLOWING: NO
CONCENTRATING,_REMEMBERING_OR_MAKING_DECISIONS_DIFFICULTY: NO
ADLS_ACUITY_SCORE: 28
WALKING_OR_CLIMBING_STAIRS: AMBULATION DIFFICULTY, ASSISTANCE 1 PERSON
ADLS_ACUITY_SCORE: 26
NUMBER_OF_TIMES_PATIENT_HAS_FALLEN_WITHIN_LAST_SIX_MONTHS: 2
WEAR_GLASSES_OR_BLIND: YES
TRANSFERRING: 1-->ASSISTANCE (EQUIPMENT/PERSON) NEEDED
DRESS: 0-->INDEPENDENT
DOING_ERRANDS_INDEPENDENTLY_DIFFICULTY: NO
EQUIPMENT_CURRENTLY_USED_AT_HOME: WHEELCHAIR, POWER
CHANGE_IN_FUNCTIONAL_STATUS_SINCE_ONSET_OF_CURRENT_ILLNESS/INJURY: YES
VISION_MANAGEMENT: GLASSES
TOILETING_ISSUES: NO
DRESSING/BATHING_DIFFICULTY: YES
ADLS_ACUITY_SCORE: 28
ADLS_ACUITY_SCORE: 26
ADLS_ACUITY_SCORE: 35
ADLS_ACUITY_SCORE: 28
WALKING_OR_CLIMBING_STAIRS_DIFFICULTY: YES
DRESS: 0-->INDEPENDENT
ADLS_ACUITY_SCORE: 28
FALL_HISTORY_WITHIN_LAST_SIX_MONTHS: YES
BATHING: 1-->ASSISTANCE NEEDED
ADLS_ACUITY_SCORE: 26
ADLS_ACUITY_SCORE: 30

## 2022-08-08 NOTE — ANESTHESIA PREPROCEDURE EVALUATION
Anesthesia Pre-Procedure Evaluation    Patient: Simran Fish   MRN: [id number] : 1980        Procedure : Procedure(s):  Left foot wound incision and drainage          Past Medical History:   Diagnosis Date     Abnormal Pap smear      ASCUS on Pap smear 7/16/10,  10/19/11    neg HPV,  Pos HPV     Asthma      heart murmur      Heart murmur      hsv 2015     HSV-2 infection 2015     Migraines       Past Surgical History:   Procedure Laterality Date     ARTHRODESIS FOOT Left 2022    Procedure: 1.  Open reduction internal fixation of left Lisfranc injury 2.  Left second tarsometatarsal joint arthrodesis 3.  Left third tarsometatarsal joint arthrodesis;  Surgeon: Aramis Chacon MD;  Location: [facility] OR     HERNIORRHAPHY UMBILICAL  2014    Procedure: HERNIORRHAPHY UMBILICAL;  Surgeon: Luz Kinney MD;  Location: [facility] OR     ORTHOPEDIC SURGERY Right 2019    ORIF hand     ZZC NONSPECIFIC PROCEDURE  2008    wisdom teeth      Allergies   Allergen Reactions     Asa [Aspirin] Hives     Codeine Hives      Social History     Tobacco Use     Smoking status: Never Smoker     Smokeless tobacco: Never Used   Substance Use Topics     Alcohol use: No      Wt Readings from Last 1 Encounters:   22 89.6 kg (197 lb 9.6 oz)        Anesthesia Evaluation   Pt has had prior anesthetic.         ROS/MED HX  ENT/Pulmonary:     (+) asthma     Neurologic:     (+) migraines,     Cardiovascular:  - neg cardiovascular ROS     METS/Exercise Tolerance:     Hematologic:       Musculoskeletal:       GI/Hepatic:    (-) GERD   Renal/Genitourinary:       Endo:       Psychiatric/Substance Use:       Infectious Disease: Comment: Left foot infection      Malignancy:       Other:      (+) Possibly pregnant, ,         Physical Exam    Airway        Mallampati: II   TM distance: > 3 FB   Neck ROM: full     Respiratory Devices and Support         Dental           Cardiovascular          Rhythm and rate: regular  and normal     Pulmonary           breath sounds clear to auscultation           OUTSIDE LABS:  CBC:   Lab Results   Component Value Date    WBC 7.9 08/08/2022    WBC 10.3 08/07/2022    HGB 8.8 (L) 08/08/2022    HGB 9.1 (L) 08/07/2022    HCT 29.0 (L) 08/08/2022    HCT 29.7 (L) 08/07/2022     08/08/2022     08/07/2022     BMP:   Lab Results   Component Value Date     (L) 08/07/2022     06/27/2022    POTASSIUM 3.7 08/07/2022    POTASSIUM 3.2 (L) 06/27/2022    CHLORIDE 103 08/07/2022    CHLORIDE 106 06/27/2022    CO2 23 08/07/2022    CO2 23 06/27/2022    BUN 6.7 08/07/2022    BUN 14 06/27/2022    CR 0.60 08/07/2022    CR 0.64 06/27/2022    GLC 84 08/07/2022     (H) 06/27/2022     COAGS: No results found for: PTT, INR, FIBR  POC:   Lab Results   Component Value Date    HCG Negative 10/29/2017    HCGS Negative 05/05/2014     HEPATIC:   Lab Results   Component Value Date    ALBUMIN 3.4 (L) 08/07/2022    PROTTOTAL 6.8 08/07/2022    ALT 9 (L) 08/07/2022    AST 13 08/07/2022    ALKPHOS 68 08/07/2022    BILITOTAL 0.2 08/07/2022     OTHER:   Lab Results   Component Value Date    LACT 1.0 08/07/2022    СВЕТЛАНА 8.4 (L) 08/07/2022    LIPASE 191 05/05/2014    TSH 1.05 04/28/2015    CRP 18.89 (H) 08/07/2022       Anesthesia Plan    ASA Status:  2   NPO Status:  NPO Appropriate    Anesthesia Type: General.     - Airway: ETT   Induction: Intravenous, RSI.   Maintenance: Balanced.        Consents    Anesthesia Plan(s) and associated risks, benefits, and realistic alternatives discussed. Questions answered and patient/representative(s) expressed understanding.    - Discussed:     - Discussed with:  Patient         Postoperative Care    Pain management: IV analgesics, Oral pain medications, Multi-modal analgesia.   PONV prophylaxis: Ondansetron (or other 5HT-3)     Comments:                Jasper Barkley MD

## 2022-08-08 NOTE — ANESTHESIA CARE TRANSFER NOTE
Patient: Simran Fish    Procedure: Procedure(s):  Left foot wound incision and drainage       Diagnosis: Displaced fracture of second metatarsal bone, left foot, initial encounter for closed fracture [S92.322A]  Diagnosis Additional Information: No value filed.    Anesthesia Type:   General     Note:    Oropharynx: oropharynx clear of all foreign objects and spontaneously breathing  Level of Consciousness: awake  Oxygen Supplementation: face mask  Level of Supplemental Oxygen (L/min / FiO2): 6  Independent Airway: airway patency satisfactory and stable  Dentition: dentition unchanged  Vital Signs Stable: post-procedure vital signs reviewed and stable  Report to RN Given: handoff report given  Patient transferred to: PACU    Handoff Report: Identifed the Patient, Identified the Reponsible Provider, Reviewed the pertinent medical history, Discussed the surgical course, Reviewed Intra-OP anesthesia mangement and issues during anesthesia, Set expectations for post-procedure period and Allowed opportunity for questions and acknowledgement of understanding      Vitals:  Vitals Value Taken Time   /85 08/08/22 1000   Temp 97.2  F (36.2  C) 08/08/22 1000   Pulse 95 08/08/22 1003   Resp 15 08/08/22 1003   SpO2 100 % 08/08/22 1003   Vitals shown include unvalidated device data.    Electronically Signed By: PEACE Puri CRNA  August 8, 2022  10:04 AM

## 2022-08-08 NOTE — UTILIZATION REVIEW
Admission Status; Secondary Review Determination    Under the authority of the Utilization Management Committee, the utilization review process indicated a secondary review on the above patient. The review outcome is based on review of the medical records, discussions with staff, and applying clinical experience noted on the date of the review.    (x) Inpatient Status Appropriate - This patient's medical care is consistent with medical management for inpatient care and reasonable inpatient medical practice.    RATIONALE FOR DETERMINATION: 42-year-old -American female with significant history of left foot fracture dislocation which required a Lisfranc ORIF on 7/13/2022.  Patient now presents with left foot swelling and drainage from the incisional wound with clinical findings concerning for significant infection 3 weeks post surgery.  Patient thus received urgent broad-spectrum IV antibiotics with plans for surgical intervention for incision and drainage of the left foot appropriate for inpatient care.    At the time of admission with the information available to the attending physician more than 2 nights Hospital complex care was anticipated, based on patient risk of adverse outcome if treated as outpatient and complex care required. Inpatient admission is appropriate based on the Medicare guidelines.    This document was produced using voice recognition software    The information on this document is developed by the utilization review team in order for the business office to ensure compliance. This only denotes the appropriateness of proper admission status and does not reflect the quality of care rendered.    The definitions of Inpatient Status and Observation Status used in making the determination above are those provided in the CMS Coverage Manual, Chapter 1 and Chapter 6, section 70.4.    Sincerely,    Alvaro Oliva MD  Utilization Review  Physician Advisor  [facility].

## 2022-08-08 NOTE — ED PROVIDER NOTES
History     Chief Complaint:  Post-op Problem       HPI   Simran Fish is a 42 year old female referred to the emergency department from [facility] urgent care due to a postoperative infection of the foot.  Patient sustained a Lisfranc type fracture of the left foot on June 27 and underwent open reduction internal fixation of the fracture on July 13.  She says she has remained in a postoperative boot since then.  She has had some clear drainage from the incision since surgery but over the last day or so her foot has become much more swollen and she has had a large amount of purulent drainage.  She was seen in urgent care last night and they referred her here in anticipation of hospital admission.  She has not had any fever she is aware of.    The patient is currently pregnant, 18 weeks and 5 days.  She has not yet felt fetal motion.  No bleeding or fluid discharge.  She does report a strange sensation in the stomach that she says is not like labor pains.  She thinks it might be nausea.    ROS:  Review of Systems   All other systems reviewed and are negative.      Allergies:  Asa [Aspirin]  Codeine     Medications:    acetaminophen (TYLENOL) 500 MG tablet  albuterol (ALBUTEROL) 108 (90 BASE) MCG/ACT inhaler  Aspirin-Acetaminophen-Caffeine (EXCEDRIN PO)  PRENATAL VIT-DOCUSATE-IRON-FA PO        Past Medical History:    Past Medical History:   Diagnosis Date     Abnormal Pap smear      ASCUS on Pap smear 7/16/10,  10/19/11     Asthma      heart murmur      Heart murmur      hsv 04/23/2015     HSV-2 infection 04/23/2015     Migraines        Past Surgical History:    Past Surgical History:   Procedure Laterality Date     ARTHRODESIS FOOT Left 7/13/2022    Procedure: 1.  Open reduction internal fixation of left Lisfranc injury 2.  Left second tarsometatarsal joint arthrodesis 3.  Left third tarsometatarsal joint arthrodesis;  Surgeon: Aramis Chacon MD;  Location: [facility] OR     HERNIORRHAPHY UMBILICAL  04/23/2014     Procedure: HERNIORRHAPHY UMBILICAL;  Surgeon: Luz Kinney MD;  Location: [facility] OR     ORTHOPEDIC SURGERY Right 2019    ORIF hand     ZZC NONSPECIFIC PROCEDURE  03/01/2008    wisdom teeth        Family History:    family history includes Alcohol/Drug in her mother; C.A.D. in her paternal grandfather; Cancer in her paternal grandmother; Cerebrovascular Disease in her paternal grandfather; Diabetes in her father.    Social History:   reports that she has never smoked. She has never used smokeless tobacco. She reports that she does not drink alcohol and does not use drugs.  PCP: Madelyn Howard     Physical Exam     Patient Vitals for the past 24 hrs:   BP Temp Temp src Pulse Resp SpO2   08/07/22 2047 129/85 99.8  F (37.7  C) Oral 89 20 98 %        Physical Exam  Constitutional:       Comments: Pleasant and cooperative   HENT:      Mouth/Throat:      Pharynx: No posterior oropharyngeal erythema.   Eyes:      Conjunctiva/sclera: Conjunctivae normal.   Cardiovascular:      Rate and Rhythm: Normal rate and regular rhythm.      Heart sounds: Normal heart sounds.   Pulmonary:      Effort: Pulmonary effort is normal.      Breath sounds: Normal breath sounds.   Abdominal:      General: Bowel sounds are normal. There is no distension.      Palpations: Abdomen is soft.      Tenderness: There is no abdominal tenderness. There is no guarding or rebound.      Comments: Gravid, uterus about two thirds to umbilicus   Musculoskeletal:         General: Normal range of motion.      Cervical back: Neck supple.      Left foot: Swelling and tenderness present.      Comments: Wound on the dorsum of the left foot has some spreading, some purulent drainage.  The foot is swollen with some erythema and tenderness.   Skin:     General: Skin is warm and dry.   Neurological:      Mental Status: She is alert.         Emergency Department Course   ECG:  ECG results from 07/13/22   EKG 12-lead, tracing only     Value    Systolic Blood  Pressure     Diastolic Blood Pressure     Ventricular Rate 79    Atrial Rate 79    SD Interval 158    QRS Duration 84        QTc 444    P Axis 52    R AXIS 19    T Axis 31    Interpretation ECG      Sinus rhythm  Normal ECG    Confirmed by MD HENNA, ANDREY ([id number]),  ALTON GUZMÁN ([id number]) on 7/14/2022 2:33:32 PM         Imaging:  CT Foot Left w Contrast              Laboratory:  Labs Ordered and Resulted from Time of ED Arrival to Time of ED Departure   COMPREHENSIVE METABOLIC PANEL - Abnormal       Result Value    Sodium 134 (*)     Potassium 3.7      Creatinine 0.60      Urea Nitrogen 6.7      Chloride 103      Carbon Dioxide (CO2) 23      Anion Gap 8      Glucose 84      Calcium 8.4 (*)     Protein Total 6.8      Albumin 3.4 (*)     Bilirubin Total 0.2      Alkaline Phosphatase 68      AST 13      ALT 9 (*)     GFR Estimate >90     ROUTINE UA WITH MICROSCOPIC REFLEX TO CULTURE - Abnormal    Color Urine Yellow      Appearance Urine Slightly Cloudy (*)     Glucose Urine Negative      Bilirubin Urine Negative      Ketones Urine Trace (*)     Specific Gravity Urine 1.027      Blood Urine Negative      pH Urine 7.0      Protein Albumin Urine 20  (*)     Urobilinogen Urine Normal      Nitrite Urine Positive (*)     Leukocyte Esterase Urine Small (*)     Bacteria Urine Few (*)     Mucus Urine Present (*)     RBC Urine 6 (*)     WBC Urine 16 (*)     Squamous Epithelials Urine 2 (*)    CBC WITH PLATELETS AND DIFFERENTIAL - Abnormal    WBC Count 10.3      RBC Count 3.40 (*)     Hemoglobin 9.1 (*)     Hematocrit 29.7 (*)     MCV 87      MCH 26.8      MCHC 30.6 (*)     RDW 15.5 (*)     Platelet Count 342      % Neutrophils 65      % Lymphocytes 22      % Monocytes 8      % Eosinophils 4      % Basophils 0      % Immature Granulocytes 1      NRBCs per 100 WBC 0      Absolute Neutrophils 6.8      Absolute Lymphocytes 2.2      Absolute Monocytes 0.8      Absolute Eosinophils 0.4      Absolute Basophils 0.0       Absolute Immature Granulocytes 0.1      Absolute NRBCs 0.0     LACTIC ACID WHOLE BLOOD - Normal    Lactic Acid 1.0     COVID-19 VIRUS (CORONAVIRUS) BY PCR   BLOOD CULTURE   BLOOD CULTURE   URINE CULTURE          Emergency Department Course:         Reviewed:  I reviewed nursing notes, vitals and past medical history      Interventions:  Medications   sodium chloride (PF) 0.9% PF flush 3 mL (has no administration in time range)   sodium chloride (PF) 0.9% PF flush 3 mL (has no administration in time range)   piperacillin-tazobactam (ZOSYN) intermittent infusion 4.5 g (0 g Intravenous Stopped 8/7/22 2219)   iopamidol (ISOVUE-370) solution 500 mL (100 mLs Intravenous Given 8/7/22 2243)   for CT scan flush use (100 mLs Intravenous Given 8/7/22 2243)        Disposition:  The patient was admitted to the hospital under the care of Dr. Nielsen.     Impression & Plan      Medical Decision Making:    Simran Fish is a 42 year old female referred to the emergency department from orthopedic urgent care with signs and symptoms consistent with a postoperative infection of her foot.  I discussed the case with Dr. Trejo of orthopedics.  Patient is treated with initial antibiotics.  Dr. Trejo requested a CT of the foot in anticipation of likely surgical intervention.  I discussed the case with Dr. Foster of OB.  She indicated they would be happy to consult and follow along.  I discussed the case with William Nielsen MD of the hospitalist service.  She will be admitted to a medical bed for further evaluation and management.      Diagnosis:    ICD-10-CM    1. Displaced fracture of second metatarsal bone, left foot, initial encounter for closed fracture  S92.322A Case Request: Left foot wound incision and drainage     Case Request: Left foot wound incision and drainage   2. Infection of deep incisional surgical site after procedure, initial encounter  T81.42XA         Discharge Medications:  New Prescriptions    No medications on  file        8/7/2022   Evy Lee MD Van Pelt, Susan Gail, MD  08/08/22 0028

## 2022-08-08 NOTE — CONSULTS
Consult Date: 08/08/2022    INFECTIOUS DISEASE CONSULTATION    LOCATION:  Room 606, [facility].    REFERRING PHYSICIAN:  Nasim Trejo MD    IMPRESSION:     1.  A 42-year-old female with recent left foot Lisfranc fracture with hardware repair, now early postop draining, redness and presumed infection.  No major clinical sepsis, status post operation with cultures pending.  2.  Twenty weeks' pregnancy.    RECOMMENDATIONS:  Currently getting vancomycin and Zosyn.  Continue for now, but await culture and try to simplify, presumably extended antibiotics needed here given the need for fracture repair, hardware in place, etc.    HISTORY OF PRESENT ILLNESS:  This 42-year-old female is seen in consultation.  The patient is generally healthy, is 20 weeks pregnant.  She had a Lisfranc left foot repair from a fracture in mid July.  Postop about 10 days later, started having some drainage, slight redness, not really perceived to be infection.  Did not see anyone, then some worsening and further drainage. At this point, concern for deep infection.  She has underwent operative I and D with cultures pending.  No major clinical sepsis or fever.    PAST MEDICAL HISTORY:  No significant infection problems historically, is 20 weeks pregnant and otherwise healthy.    ALLERGIES:  NO ANTIBIOTIC ALLERGIES.    MEDICATIONS:  As listed.    SOCIAL AND FAMILY HISTORY:  No recent travel exposures.  No one she has been around has been ill.  No known resistant pathogens historically.    REVIEW OF SYSTEMS:  Continued pain in the foot.  This has been an issue as it has been increasing.    PHYSICAL EXAMINATION:    GENERAL:  The patient appears stated age.  She looks well.  She is just out of surgery, so coming out of anesthesia somewhat sleepy.  HEENT:  No thrush or intraoral lesions.  HEART AND LUNGS:  Unremarkable.  ABDOMEN:  Soft and nontender.  EXTREMITIES:  The foot is wrapped.  No signs of proximal infection.    LABORATORY DATA:   Operative cultures are pending.  CRP 19.  Sed rate ordered and pending.  White count 10,300.    Thank you very much for the consultation.  I will follow with the patient with you.    Kevyn Mondragon MD        D: 2022   T: 2022   MT: MAX    Name:     MARTINE HAZEL  MRN:      [id number]        Account:      [id number]   :      1980           Consult Date: 2022     Document: [id number]    cc:  Nasim Trejo MD

## 2022-08-08 NOTE — PHARMACY-ADMISSION MEDICATION HISTORY
Admission medication history interview status for this patient is complete. See [facility] admission navigator for allergy information, prior to admission medications and immunization status.     Medication history interview done, indicate source(s): Patient @ [phone]  Medication history resources (including written lists, pill bottles, clinic record):None      Changes made to PTA medication list:  Added: excedrin and Prenatl vitamin  Changed: none  Reported as Not Taking: none  Removed: Symbicort and Norco    Actions taken by pharmacist (provider contacted, etc):None     Additional medication history information:None    Medication reconciliation/reorder completed by provider prior to medication history?  n   (Y/N)     For patients on insulin therapy:   Do you use sliding scale insulin based on blood sugars?   What is your pre-meal insulin coverage?    Do you typically eat three meals a day?   How many times do you check your blood glucose per day?   How many episodes of hypoglycemia do you typically have per month?   Do you have a Continuous Glucose Monitor (CGM)?      Prior to Admission medications    Medication Sig Last Dose Taking? Auth Provider Long Term End Date   acetaminophen (TYLENOL) 500 MG tablet Take 500-1,000 mg by mouth every 6 hours as needed  Yes Reported, Patient     albuterol (ALBUTEROL) 108 (90 BASE) MCG/ACT inhaler Inhale 1-2 puffs into the lungs every 4 hours as needed for shortness of breath / dyspnea  Yes Juliana Reilly PA-C Yes    Aspirin-Acetaminophen-Caffeine (EXCEDRIN PO) Take 2 tablets by mouth daily as needed (Headaches)  Yes Reported, Patient     PRENATAL VIT-DOCUSATE-IRON-FA PO Take 1 tablet by mouth daily 8/6/2022 at Unknown time Yes Reported, Patient

## 2022-08-08 NOTE — PLAN OF CARE
Goal Outcome Evaluation:             Pt  is  A&OX4. Dx: L/foot infections.yellow color scant drainage. +3 edema to L/ foot and ankle. Pt denies SOB, N/V.  No N/T.Pt is 18 weeks pregnant.pt is NPO scheduled for surgery on 8/8/22. Periferal IV to Left antecubital areas. Pt has   2 IV antibiotics Vancomycin  and Zosyn.will continue monitoring.

## 2022-08-08 NOTE — H&P
[facility]    Hospitalist Admission Note    Name: Simran Fish    MRN: [id number]  YOB: 1980    Age: 42 year old  Date of admission: 8/7/2022  Primary care provider: Madelyn Howard  Admitting physician : William Nielsen M.D. ,M.B.A.       Brief summary of admission assessment/MDM:    Simran Fish is a 42 year old -American female with a significant past medical history of left foot fracture and dislocation which required operative reduction internal fixation by Dr. Chacon on July 13, 2022 who presents with left foot swelling and drainage from incisional wound.    On presentation to emergency department blood pressure 129/85, heart rate 89, temperature 99.8.  Respiratory rate 20 and oxygen saturation 98%.    Blood work revealed sodium of 134, calcium 8.4, otherwise unremarkable comprehensive metabolic panel.  WBC count 10.3, hemoglobin 9.1, platelet 342.    CT of the left foot showed mineralization within the adjacent soft tissue and soft tissue edema along the dorsal lateral midfoot.  Further musculoskeletal radiology review pending.    Orthopedic surgery was consulted and Dr. Trejo was contacted who recommended admission and possible incision and drainage tomorrow.    Assessment and Plan       #1.Postop pain and swelling , erythema and drainage of left foot incisional wound following ORIF of left foot fracture July 13 by Dr Chacon   -- concern for incisional wound infection     Plan :   -- admit to ortho floor   -- orthopedic surgery already contacted and Dr Trejo recommended admission ,to  hold abx pending surgery anticipated tomorrow.  Antibiotics already started in the emergency department, the wound looks infected-we continue Zosyn and will involve ID service for further antibiotic management  -- NPO after midnight   -- pain medication as needed     #2.20 weeks pregnancy   -- consult OB service -she follows with [facility] n[facility]      #3.   History of vasovagal syncope with close displaced fracture of metatarsal bone of the left foot June 27, 2022.  -- Seen in the emergency department on June 27 and July 3 acute after a fall.  Patient was subsequently seen by orthopedic surgery and surgical intervention noted on July 13 as above.  -- Currently hemodynamically stable.    Chronic  comorbid medical conditions:    #Migraine headache            # Admission Status: Will admit patient to hospitalist service as inpatient as patient likely need over two mid night stay  in the hospital.    # Diet:NPO after midnight    # Activity:Regular activity    # Education/Counseling :Discussed treatment plan with the patient    # Consults:Inpatient consult with orthopedics    # VTE prophylactic measures:prophylaxis against venous thromboembolism with PCD       # Code Status:Full Code    # Disposition:anticipate discharge to home and Anticipate discharge in 3 days        Disclaimer: This note consists of symbols derived from keyboarding, dictation and/or voice recognition software. As a result, there may be errors in the script that have gone undetected. Please consider this when interpreting information found in this chart.             Chief Complaint:     Left foot swelling and draining wound  History is obtained from the patient          History of Present Illness:      This patient is a 42 year old  female with a significant past medical history of surgery last month on her left foot who presents with the following condition requiring a hospital admission:      Left foot postoperative wound infection  Patient is 42-year-old female who had vasovagal syncope end of June which resulted in fall and fracture of her left foot.  She was seen in the emergency department and was conservatively treated.  She fell again on July 3 and was referred to orthopedic surgery for evaluation.  She had operative reduction and internal fixation on July 13 here at [facility].   She relatively doing well until yesterday when she started to have swelling, redness and drainage from her left foot.  She denies any fever or chills.  She has no chest pain or shortness of breath.  No syncopal event.  She came to the emergency department for evaluation and CT scan was obtained as above.  Orthopedic surgery was consulted and patient was recommended admission and further operative intervention planned for tomorrow.           Past Medical History:     Past Medical History:   Diagnosis Date     Abnormal Pap smear      ASCUS on Pap smear 7/16/10,  10/19/11    neg HPV,  Pos HPV     Asthma      heart murmur      Heart murmur      hsv 04/23/2015     HSV-2 infection 04/23/2015     Migraines             Past Surgical History:     Past Surgical History:   Procedure Laterality Date     ARTHRODESIS FOOT Left 7/13/2022    Procedure: 1.  Open reduction internal fixation of left Lisfranc injury 2.  Left second tarsometatarsal joint arthrodesis 3.  Left third tarsometatarsal joint arthrodesis;  Surgeon: Aramis Chacon MD;  Location: [facility] OR     HERNIORRHAPHY UMBILICAL  04/23/2014    Procedure: HERNIORRHAPHY UMBILICAL;  Surgeon: Luz Kinney MD;  Location: [facility] OR     ORTHOPEDIC SURGERY Right 2019    ORIF hand     ZZC NONSPECIFIC PROCEDURE  03/01/2008    wisdom teeth             Social History:     Social History     Tobacco Use     Smoking status: Never Smoker     Smokeless tobacco: Never Used   Substance Use Topics     Alcohol use: No             Family History:     Family History   Problem Relation Age of Onset     Diabetes Father      Cancer Paternal Grandmother         lung     Alcohol/Drug Mother      C.A.D. Paternal Grandfather      Cerebrovascular Disease Paternal Grandfather             Allergies:     Allergies   Allergen Reactions     Asa [Aspirin] Hives     Codeine Hives             Medications:        Prior to Admission medications    Medication Sig Last Dose Taking? Auth Provider Long Term  End Date   acetaminophen (TYLENOL) 500 MG tablet Take 500-1,000 mg by mouth every 6 hours as needed  Yes Reported, Patient     albuterol (ALBUTEROL) 108 (90 BASE) MCG/ACT inhaler Inhale 1-2 puffs into the lungs every 4 hours as needed for shortness of breath / dyspnea  Yes Juliana Reilly PA-C Yes    Aspirin-Acetaminophen-Caffeine (EXCEDRIN PO) Take 2 tablets by mouth daily as needed (Headaches)  Yes Reported, Patient     PRENATAL VIT-DOCUSATE-IRON-FA PO Take 1 tablet by mouth daily 8/6/2022 at Unknown time Yes Reported, Patient            Review of Systems:     A Comprehensive greater than 10 system review of systems was carried out.  Pertinent positives and negatives are noted above in HPI.  Otherwise negative for contributory information.           Physical Exam:     Vital signs were reviewed    Temp:  [99.8  F (37.7  C)] 99.8  F (37.7  C)  Pulse:  [89] 89  Resp:  [20] 20  BP: (129)/(85) 129/85  SpO2:  [98 %] 98 %        GEN: awake, alert, cooperative, no apparent distress, oriented x 3    NECK:Supple ,no mass or thyromegaly     HEENT:  Normocephalic/atraumatic, no scleral icterus, no nasal discharge, mouth moist.    CV:  Regular rate and rhythm, no murmur or JVD.  S1 + S2 noted, no S3 or S4.    LUNGS:  Clear to auscultation bilaterally without rales/rhonchi/wheezing/retractions.  Symmetric chest rise on inhalation noted.    ABD:  Active bowel sounds, soft, non-tender/non-distended.  No rebound/guarding/rigidity.    EXT: Left foot swollen and tender.  Incisional wound is tender but not open.  Mild erythema present.  LGS: No cervical or axillary lymphadenopathy     SKIN:  Dry to touch, warm ,no exanthems noted in the visualized areas.    Neurologic:Grossly intact,non focal . No acute focal neurologic deficit     Psychaitric exam: Mood and affect normal                  Data:       All laboratory and imaging data in the past 24 hours reviewed     Results for orders placed or performed during the hospital  encounter of 08/07/22   CT Foot Left w Contrast     Status: None (Preliminary result)    Narrative    PRELIMINARY EXAM: CT FOOT LEFT W CONTRAST  LOCATION: [facility]  DATE/TIME: 8/7/2022 10:42 PM    INDICATION: Surgery, now signs of infection.  COMPARISON: None.  TECHNIQUE: IV contrast. Axial, sagittal and coronal thin-section reconstruction. Dose reduction techniques were used.   CONTRAST: 100 mL Isovue 370.    PRELIMINARY FINDINGS:   Postsurgical change with plate and screw fixation across the second and third tarsometatarsal joints. Additional screw extending from the medial cuneiform to the base of the second metatarsal. Uncertain if ill-defined sclerosis at base of second and   third metatarsals is related to healing of prior fractures. There is mineralization within adjacent soft tissues and soft tissue edema along the dorsal lateral midfoot. Infection not excluded.    Please see final interpretation by MSK in the a.m.    Preliminary Interpretation Dictated By: Soni Elias MD  Date: 8/7/2022    Comprehensive metabolic panel     Status: Abnormal   Result Value Ref Range    Sodium 134 (L) 136 - 145 mmol/L    Potassium 3.7 3.4 - 5.3 mmol/L    Creatinine 0.60 0.51 - 0.95 mg/dL    Urea Nitrogen 6.7 6.0 - 20.0 mg/dL    Chloride 103 98 - 107 mmol/L    Carbon Dioxide (CO2) 23 22 - 29 mmol/L    Anion Gap 8 7 - 15 mmol/L    Glucose 84 70 - 99 mg/dL    Calcium 8.4 (L) 8.6 - 10.0 mg/dL    Protein Total 6.8 6.4 - 8.3 g/dL    Albumin 3.4 (L) 3.5 - 5.2 g/dL    Bilirubin Total 0.2 <=1.2 mg/dL    Alkaline Phosphatase 68 35 - 104 U/L    AST 13 10 - 35 U/L    ALT 9 (L) 10 - 35 U/L    GFR Estimate >90 >60 mL/min/1.73m2   UA with Microscopic reflex to Culture     Status: Abnormal    Specimen: Urine, Clean Catch   Result Value Ref Range    Color Urine Yellow Colorless, Straw, Light Yellow, Yellow    Appearance Urine Slightly Cloudy (A) Clear    Glucose Urine Negative Negative mg/dL    Bilirubin Urine  Negative Negative    Ketones Urine Trace (A) Negative mg/dL    Specific Gravity Urine 1.027 1.003 - 1.035    Blood Urine Negative Negative    pH Urine 7.0 5.0 - 7.0    Protein Albumin Urine 20  (A) Negative mg/dL    Urobilinogen Urine Normal Normal, 2.0 mg/dL    Nitrite Urine Positive (A) Negative    Leukocyte Esterase Urine Small (A) Negative    Bacteria Urine Few (A) None Seen /HPF    Mucus Urine Present (A) None Seen /LPF    RBC Urine 6 (H) <=2 /HPF    WBC Urine 16 (H) <=5 /HPF    Squamous Epithelials Urine 2 (H) <=1 /HPF    Narrative    Urine Culture ordered based on laboratory criteria   Lactic acid whole blood     Status: Normal   Result Value Ref Range    Lactic Acid 1.0 0.7 - 2.0 mmol/L   CBC with platelets and differential     Status: Abnormal   Result Value Ref Range    WBC Count 10.3 4.0 - 11.0 10e3/uL    RBC Count 3.40 (L) 3.80 - 5.20 10e6/uL    Hemoglobin 9.1 (L) 11.7 - 15.7 g/dL    Hematocrit 29.7 (L) 35.0 - 47.0 %    MCV 87 78 - 100 fL    MCH 26.8 26.5 - 33.0 pg    MCHC 30.6 (L) 31.5 - 36.5 g/dL    RDW 15.5 (H) 10.0 - 15.0 %    Platelet Count 342 150 - 450 10e3/uL    % Neutrophils 65 %    % Lymphocytes 22 %    % Monocytes 8 %    % Eosinophils 4 %    % Basophils 0 %    % Immature Granulocytes 1 %    NRBCs per 100 WBC 0 <1 /100    Absolute Neutrophils 6.8 1.6 - 8.3 10e3/uL    Absolute Lymphocytes 2.2 0.8 - 5.3 10e3/uL    Absolute Monocytes 0.8 0.0 - 1.3 10e3/uL    Absolute Eosinophils 0.4 0.0 - 0.7 10e3/uL    Absolute Basophils 0.0 0.0 - 0.2 10e3/uL    Absolute Immature Granulocytes 0.1 <=0.4 10e3/uL    Absolute NRBCs 0.0 10e3/uL   CBC with platelets differential     Status: Abnormal    Narrative    The following orders were created for panel order CBC with platelets differential.  Procedure                               Abnormality         Status                     ---------                               -----------         ------                     CBC with platelets and d...[[id number]]  Abnormal             Final result                 Please view results for these tests on the individual orders.         All imaging studies reviewed by me.       Patient`s old medical records reviewed and case discussed with the ED physician.    ED course-Reviewed  and care plan discussed with Dr. Lee

## 2022-08-08 NOTE — ED NOTES
[facility]  ED Nurse Handoff Report    Simran Fish is a 42 year old female   ED Chief complaint: Post-op Problem  . ED Diagnosis:   Final diagnoses:   Infection of deep incisional surgical site after procedure, initial encounter     Allergies:   Allergies   Allergen Reactions     Asa [Aspirin] Hives     Codeine Hives       Code Status: Full Code  Activity level - Baseline/Home:  Independent. Activity Level - Current:   Stand by Assist. Lift room needed: No. Bariatric: No   Needed: No   Isolation: No. Infection: Not Applicable.     Vital Signs:   Vitals:    08/07/22 2047   BP: 129/85   Pulse: 89   Resp: 20   Temp: 99.8  F (37.7  C)   TempSrc: Oral   SpO2: 98%       Cardiac Rhythm:  ,      Pain level:    Patient confused: No. Patient Falls Risk: Yes.   Elimination Status: Has voided   Patient Report - Initial Complaint: Foot pain related to post op issue. Focused Assessment: Open reduction internal fixation of fracture in left foot within past month. Now presents with increased pain and redness/swelling to left foot for past 2 days. Of note, pt is approximately 20 weeks pregnant. NPO at 0000 8/8  Tests Performed: Labs, CT. Abnormal Results:   Labs Ordered and Resulted from Time of ED Arrival to Time of ED Departure   COMPREHENSIVE METABOLIC PANEL - Abnormal       Result Value    Sodium 134 (*)     Potassium 3.7      Creatinine 0.60      Urea Nitrogen 6.7      Chloride 103      Carbon Dioxide (CO2) 23      Anion Gap 8      Glucose 84      Calcium 8.4 (*)     Protein Total 6.8      Albumin 3.4 (*)     Bilirubin Total 0.2      Alkaline Phosphatase 68      AST 13      ALT 9 (*)     GFR Estimate >90     ROUTINE UA WITH MICROSCOPIC REFLEX TO CULTURE - Abnormal    Color Urine Yellow      Appearance Urine Slightly Cloudy (*)     Glucose Urine Negative      Bilirubin Urine Negative      Ketones Urine Trace (*)     Specific Gravity Urine 1.027      Blood Urine Negative      pH Urine 7.0      Protein  Albumin Urine 20  (*)     Urobilinogen Urine Normal      Nitrite Urine Positive (*)     Leukocyte Esterase Urine Small (*)     Bacteria Urine Few (*)     Mucus Urine Present (*)     RBC Urine 6 (*)     WBC Urine 16 (*)     Squamous Epithelials Urine 2 (*)    CBC WITH PLATELETS AND DIFFERENTIAL - Abnormal    WBC Count 10.3      RBC Count 3.40 (*)     Hemoglobin 9.1 (*)     Hematocrit 29.7 (*)     MCV 87      MCH 26.8      MCHC 30.6 (*)     RDW 15.5 (*)     Platelet Count 342      % Neutrophils 65      % Lymphocytes 22      % Monocytes 8      % Eosinophils 4      % Basophils 0      % Immature Granulocytes 1      NRBCs per 100 WBC 0      Absolute Neutrophils 6.8      Absolute Lymphocytes 2.2      Absolute Monocytes 0.8      Absolute Eosinophils 0.4      Absolute Basophils 0.0      Absolute Immature Granulocytes 0.1      Absolute NRBCs 0.0     LACTIC ACID WHOLE BLOOD - Normal    Lactic Acid 1.0     COVID-19 VIRUS (CORONAVIRUS) BY PCR   BLOOD CULTURE   BLOOD CULTURE   URINE CULTURE     CT Foot Left w Contrast           .   Treatments provided: See MAR  Family Comments: n/a  OBS brochure/video discussed/provided to patient:  N/A  ED Medications:   Medications   sodium chloride (PF) 0.9% PF flush 3 mL (has no administration in time range)   sodium chloride (PF) 0.9% PF flush 3 mL (has no administration in time range)   piperacillin-tazobactam (ZOSYN) intermittent infusion 4.5 g (0 g Intravenous Stopped 8/7/22 2219)   iopamidol (ISOVUE-370) solution 500 mL (100 mLs Intravenous Given 8/7/22 2243)   for CT scan flush use (100 mLs Intravenous Given 8/7/22 2243)     Drips infusing:  No  For the majority of the shift, the patient's behavior Green. Interventions performed were n/a.    Sepsis treatment initiated: No     Patient tested for COVID 19 prior to admission: YES    ED Nurse Name/Phone Number: Radha Bowens RN,   11:33 PM    RECEIVING UNIT ED HANDOFF REVIEW    Above ED Nurse Handoff Report was reviewed: Yes  Reviewed  by: Rhianna Vaughn RN on August 8, 2022 at 1:01 AM

## 2022-08-08 NOTE — ED TRIAGE NOTES
Pt states seen at [facility]. Pt had open reduction internal fixation of fracture on left foot within past month. Pt states having worsening pain, swelling and redness to left foot for past 2 days. Pt currently approximately 20 weeks gestation. ABCs intact GCS 15     Triage Assessment     Row Name 08/07/22 2047       Triage Assessment (Adult)    Airway WDL WDL       Respiratory WDL    Respiratory WDL WDL       Skin Circulation/Temperature WDL    Skin Circulation/Temperature WDL WDL       Cardiac WDL    Cardiac WDL WDL       Peripheral/Neurovascular WDL    Peripheral Neurovascular WDL WDL       Cognitive/Neuro/Behavioral WDL    Cognitive/Neuro/Behavioral WDL WDL

## 2022-08-08 NOTE — PROGRESS NOTES
[facility]  Hospitalist Progress Note  William Nielsen M.D., M.B.A.   08/08/2022    Reason for Stay/active problem list      Postoperative left foot wound dehiscence s/p left foot debridement by orthopedic surgery's admission    Pregnancy         Assessment and Plan:        Summary of Stay: Simran Fish is a 42 year old -American female with a significant past medical history of left foot fracture and dislocation which required operative reduction internal fixation by Dr. Chacon on July 13, 2022 who presents with left foot swelling and drainage from incisional wound.     On presentation to emergency department blood pressure 129/85, heart rate 89, temperature 99.8.  Respiratory rate 20 and oxygen saturation 98%.     Blood work revealed sodium of 134, calcium 8.4, otherwise unremarkable comprehensive metabolic panel.  WBC count 10.3, hemoglobin 9.1, platelet 342.     CT of the left foot showed mineralization within the adjacent soft tissue and soft tissue edema along the dorsal lateral midfoot.  Further musculoskeletal radiology review pending.     Orthopedic surgery was consulted and Dr. Trejo was contacted who recommended admission and incision and drainage next day.    Patient was taken to the OR on August 8 and had left foot wound debridement.          Problem List with Assessment and Plan:    #1.Postop pain and swelling , erythema and drainage of left foot incisional wound following ORIF of left foot fracture July 13 by Dr Chacon   -- concern for incisional wound infection   -- Orthopedic surgery consulted and patient had incision and drainage on August 8.  Patient was continued with antibiotic and infectious disease was consulted to assist with further antibiotic management.  --Currently s/p surgery and patient is somnolent.  She denies any chest pain or shortness of breath or significant pain.  Continue to monitor respiratory status, pain control and follow closely.       #2.  "Midtrimester pregnancy  --Follows with [facility] in [place].  --OB edition consulted and recommendation noted.  --Plan to avoid NSAIDs and continue daily fetal heart Dopplers          #3.  History of vasovagal syncope with close displaced fracture of metatarsal bone of the left foot June 27, 2022.  -- Seen in the emergency department on June 27 and July 3 acute after a fall.  Patient was subsequently seen by orthopedic surgery and surgical intervention noted on July 13 as above.  -- Currently hemodynamically stable.    #4.  Asthma: Stable    #5.  Social concerns: Social service consulted      Plan for today:    Continue postoperative monitoring    Pain control    Incentive spirometry    Advance diet     Anticipate infectious disease consult      VTE Prophylaxis: Pneumatic Compression Devices  Code Status: Full Code  Diet: Advance Diet as Tolerated: Regular Diet Adult    Eric Catheter: Not present    Family updated today: No     Disposition: May discharge in the next 2 to 3 days if okay with orthopedic and infectious disease service        Interval History (Subjective):        Patient is seen and examined by me today and medical record reviewed.Overnight events noted and care discussed with nursing staff.  Patient had surgery this morning.  He was seen postoperatively.  She is somnolent but arousable.  Denies any complaint.                      Physical Exam:        Last Vital Signs:  /74 (BP Location: Right arm)   Pulse 74   Temp 97.5  F (36.4  C) (Temporal)   Resp 28   Ht 1.626 m (5' 4\")   Wt 89.6 kg (197 lb 9.6 oz)   LMP 04/14/2022 (Approximate)   SpO2 97%   BMI 33.92 kg/m      I/O last 3 completed shifts:  In: -   Out: 400 [Urine:400]    Wt Readings from Last 5 Encounters:   08/08/22 89.6 kg (197 lb 9.6 oz)   07/13/22 81.6 kg (180 lb)   04/22/21 81.6 kg (180 lb)   04/26/19 90.7 kg (200 lb)   05/13/16 83.9 kg (185 lb)        Constitutional: Awake, alert, cooperative, no apparent distress "     Respiratory: Clear to auscultation bilaterally, no crackles or wheezing   Cardiovascular: Regular rate and rhythm, normal S1 and S2, and no murmur noted   Abdomen: Normal bowel sounds, soft, non-distended, non-tender   Skin: No new rashes, no cyanosis, dry to touch   Neuro: Alert with  no new focal weakness   Extremities:  left foot dressed   Other(s):        All other systems: Negative          Medications:        All current medications were reviewed with changes reflected in problem list.         Data:      All new lab and imaging data was reviewed.      Data reviewed today: I reviewed all new labs and imaging results over the last 24 hours. I personally reviewed       Recent Labs   Lab 08/08/22 0631 08/07/22 2125   WBC 7.9 10.3   HGB 8.8* 9.1*   HCT 29.0* 29.7*   MCV 88 87    342     No results for input(s): CULT in the last 168 hours.  Recent Labs   Lab 08/08/22 0631 08/07/22 2125    134*   POTASSIUM 4.0 3.7   CHLORIDE 108* 103   CO2 21* 23   ANIONGAP 7 8   GLC 88 84   BUN 5.5* 6.7   CR 0.58 0.60   GFRESTIMATED >90 >90   СВЕТЛАНА 7.9* 8.4*   PROTTOTAL  --  6.8   ALBUMIN  --  3.4*   BILITOTAL  --  0.2   ALKPHOS  --  68   AST  --  13   ALT  --  9*       Recent Labs   Lab 08/08/22 0631 08/07/22 2125   GLC 88 84       No results for input(s): INR in the last 168 hours.      No results for input(s): TROPONIN, TROPI, TROPR in the last 168 hours.    Invalid input(s): TROP, TROPONINIES    Recent Results (from the past 48 hour(s))   CT Foot Left w Contrast    Narrative    FINAL REPORT    I agree with the preliminary report. Dorsal plate and screw fixation with bone graft at the second and third TMT joints including fixation of the proximal 2nd and 3rd metatarsal fractures. Screw fixation of the medial to middle cuneiform. Ununited   fracture of the base of the 4th metatarsal. No hardware fracture or lucency adjacent to the hardware fixation. No acute fractures are evident. Diffuse soft tissue swelling  of the dorsum of the foot with scattered areas of bone graft/osseous debris. No   subcutaneous abscess or subcutaneous emphysema. Additional mild diffuse subcutaneous edema in the ankle. No joint effusions. No retracted tendon tear. Normal alignment of the peroneal tendons.    Final Interpretation Dictated By: Max Abrams MD  Date: 8/8/2022       PRELIMINARY EXAM: CT FOOT LEFT W CONTRAST  LOCATION: [facility]  DATE/TIME: 8/7/2022 10:42 PM    INDICATION: Surgery, now signs of infection.  COMPARISON: None.  TECHNIQUE: IV contrast. Axial, sagittal and coronal thin-section reconstruction. Dose reduction techniques were used.   CONTRAST: 100 mL Isovue 370.    PRELIMINARY FINDINGS:   Postsurgical change with plate and screw fixation across the second and third tarsometatarsal joints. Additional screw extending from the medial cuneiform to the base of the second metatarsal. Uncertain if ill-defined sclerosis at base of second and   third metatarsals is related to healing of prior fractures. There is mineralization within adjacent soft tissues and soft tissue edema along the dorsal lateral midfoot. Infection not excluded.    Please see final interpretation by MSK in the a.m.    Preliminary Interpretation Dictated By: Soni Elias MD  Date: 8/7/2022        COVID Status:  COVID-19 PCR Results    COVID-19 PCR Results 11/16/20 4/22/21 4/22/21 11/1/21 1/16/22 7/13/22 8/7/22 2045 2321       COVID-19 Virus by PCR (External Result) Detected (A)  Negative Negative      Flu A/B & SARS-COV-2 PCR Source  Nasopharyngeal        SARS-CoV-2 PCR Result  NEGATIVE        SARS CoV2 PCR     Positive (A) Negative Negative   (A) Abnormal value       Comments are available for some flowsheets but are not being displayed.         COVID-19 Antibody Results, Testing for Immunity    COVID-19 Antibody Results, Testing for Immunity   No data to display.              Disclaimer: This note consists of symbols derived  from keyboarding, dictation and/or voice recognition software. As a result, there may be errors in the script that have gone undetected. Please consider this when interpreting information found in this chart.

## 2022-08-08 NOTE — CONSULTS
ID consult dictated IMP 1 41 yo female early postop L foot hardware reapir likely deep infection I and D cxs pending    REC vanco /zosyn await cxs and adjust

## 2022-08-08 NOTE — OP NOTE
ORTHOPEDIC OPERATIVE NOTE      PREOPERATIVE DIAGNOSIS:  Left foot wound dehiscence status post Lisfranc ORIF/fusion    POSTOPERATIVE DIAGNOSIS: Same    PROCEDURE:  Left foot wound debridement, delayed secondary closure    SURGEON:  Nasim Trejo MD    ASSISTANT: Sameer Pickering PA-C, whose assistance was required for positioning, prep and drape, debridement, and closure    SPECIMENS: Cultures    COMPLICATIONS: None    ESTIMATED BLOOD LOSS: 25 mL    TOURNIQUET TIME:  N/A    INDICATIONS: The patient is a 42-year-old female who underwent ORIF and fusion of a Lisfranc injury.  She presented yesterday to urgent care with drainage from her wound as well as an acute increase in swelling.  She states she is ambulating occasionally on it.  She states she has difficulty remaining nonweightbearing due to her small children.  She states she is fairly often putting at least some weight on her foot.  She denies any other complaints.  She denies any fevers.  CT scan demonstrates no obvious fluid collection and intact fixation.  She is an appropriately indicated candidate for an I&D.  I discussed the risks benefits and alternatives of the procedure with the patient including risk of infection, wound healing complication, neurovascular, blood loss, and the risk of anesthesia.  We discussed benefits including improved chance of infection clearance.  We discussed alternatives include nonoperative management.  The informed consent was signed and document.  Met with the patient preoperatively to manas the operative extremity.    OPERATIVE COURSE: She underwent general anesthesia was positioned supine.  Bony problems well-padded.  Left lower extremity prepped and draped in sterile orthopedic fashion using ChloraPrep.  The surgical team prepped in.  Procedural pause was conducted.  Following generalized agreement with the pause the old wound edges were excised.  Dry crusted drainage was present in the central portion of the wound  although the wound had undergone a dehiscence of the distal portion of the wound.  Upon dissecting into the subcutaneous tissue a clear tract down to the hardware was identified.  There is some fibrinous necrotic tissue in the wound.  Some of this was sampled for culture.  Once cultures were obtained we debrided the wound.  The debridement was excisional and deep cell debridement was fascia.  Devitalized tissue was removed with rongeur and curette.  When that was complete we thoroughly irrigated the wound with copious muscle normal saline.  We then closed the wound again with 2-0 Monocryl and 3-0 nylon suture.  Sterile dressings were applied.  The patient was transferred to the recovery room in stable condition, sustaining no complications    POST OP PLAN:    1. Activity: Nonweightbearing operative extremity elevate to reduce swelling.  Boot immobilization  2. ABX: Zosyn and vancomycin pending cultures and ID consult  3. DVT prophylaxis: SCDs  4. Dispo: Pending antibiotic recommendations

## 2022-08-08 NOTE — ANESTHESIA POSTPROCEDURE EVALUATION
Patient: Simran Fish    Procedure: Procedure(s):  Left foot wound incision and drainage       Anesthesia Type:  General    Note:  Disposition: Inpatient   Postop Pain Control: Uneventful            Sign Out: Well controlled pain   PONV: No   Neuro/Psych: Uneventful            Sign Out: Acceptable/Baseline neuro status   Airway/Respiratory: Uneventful            Sign Out: Acceptable/Baseline resp. status   CV/Hemodynamics: Uneventful            Sign Out: Acceptable CV status; No obvious hypovolemia; No obvious fluid overload   Other NRE: NONE   DID A NON-ROUTINE EVENT OCCUR? No           Last vitals:  Vitals Value Taken Time   /78 08/08/22 1100   Temp 97.1  F (36.2  C) 08/08/22 1100   Pulse 82 08/08/22 1100   Resp 20 08/08/22 1100   SpO2 93 % 08/08/22 1100       Electronically Signed By: Jasper Barkley MD  August 8, 2022  11:54 AM

## 2022-08-08 NOTE — PHARMACY-VANCOMYCIN DOSING SERVICE
"Pharmacy Vancomycin Initial Note  Date of Service 2022  Patient's  1980  42 year old, female    Indication: Skin and Soft Tissue Infection    Current estimated CrCl = Estimated Creatinine Clearance: 137 mL/min (based on SCr of 0.58 mg/dL).    Creatinine for last 3 days  2022:  9:25 PM Creatinine 0.60 mg/dL  2022:  6:31 AM Creatinine 0.58 mg/dL    Recent Vancomycin Level(s) for last 3 days  No results found for requested labs within last 72 hours.      Vancomycin IV Administrations (past 72 hours)                   vancomycin 1750 mg in 0.9% NaCl 500 ml intermittent infusion 1,750 mg (mg) 1,750 mg Given 22 0259                Nephrotoxins and other renal medications (From now, onward)    Start     Dose/Rate Route Frequency Ordered Stop    22 1400  vancomycin 1250 mg in 0.9% NaCl 250 mL intermittent infusion 1,250 mg         1,250 mg  over 90 Minutes Intravenous EVERY 12 HOURS 22 1345      22 0630  piperacillin-tazobactam (ZOSYN) infusion 3.375 g        Note to Pharmacy: For [facility], [facility] and [facility]: For Zosyn-naive patients, use the \"Zosyn initial dose + extended infusion\" order panel.    3.375 g  100 mL/hr over 30 Minutes Intravenous EVERY 6 HOURS 22 0141            Contrast Orders - past 72 hours (72h ago, onward)    Start     Dose/Rate Route Frequency Stop    22 2245  iopamidol (ISOVUE-370) solution 500 mL         500 mL Intravenous ONCE 22 2243          Insight[employer] Prediction of Planned Initial Vancomycin Regimen  Loading dose: N/A  Regimen: 1250 mg IV every 12 hours.  Start time: 14:41 on 2022  Exposure target: AUC24 (range)400-600 mg/L.hr   AUC24,ss: 462 mg/L.hr  Probability of AUC24 > 400: 65 %  Ctrough,ss: 13.7 mg/L  Probability of Ctrough,ss > 20: 23 %  Probability of nephrotoxicity (Lodise BEATRIS ): 9 %          Plan:  1. Start vancomycin  1250 mg IV q12h.   2. Vancomycin monitoring method: AUC  3. Vancomycin therapeutic monitoring goal: " 400-600 mg*h/L  4. Pharmacy will check vancomycin levels as appropriate in 1-3 Days.    5. Serum creatinine levels will be ordered daily for the first week of therapy and at least twice weekly for subsequent weeks.      CHAR PARK RP

## 2022-08-08 NOTE — PROVIDER NOTIFICATION
08/08/22 0817   Fetal Assessment   Fetal Movement active   Fetal HR Assessment Method intermittent auscultation, using Doppler   Fetal HR (beats/min) 145   Fetal HR Baseline normal range   Patient in pre op for an ankle /foot procedure.  Fetal heart rate was obtained prior to the surgery.

## 2022-08-08 NOTE — PROGRESS NOTES
Left foot wound concerns for infection 3 weeks s/p Lisfranc ORIF by Dr. Chacon.    Plan:  - Admit to medicine, hold ABX pending surgery  - Anticipate OR tomorrow for I&D L foot  - NPO p MN  - Formal ortho consult to follow

## 2022-08-08 NOTE — CONSULTS
"  2022    Simran Fish  [id number]            OB Inpatient Consult      Ms. Fish  is here with postoperative wound infection of the left foot. She had a displaced fracture in the left foot, requiring ORIF on 2022. She is being admitted for IV antibiotics (Zosyn) and possible I&D 2022. Additionally, she is pregnant, due 1/3/2023 by 8w1d US, making her 18w6d on 2022.    She has noticed no abdominal cramping, vaginal bleeding, or leaking of fluid per vagina. She notes no quickening/fetal movement as yet in the pregnancy.    Patient's last menstrual period was 2022 (approximate).   Her Estimated Date of Delivery: 2023, making her 18 weeks 6 days     Estimated body mass index is 31.89 kg/m  as calculated from the following:    Height as of 22: 1.6 m (5' 3\").    Weight as of 22: 81.6 kg (180 lb).  Her prenatal course has been complicated by:  fracture of left foot with ORIF and subsequent infection  grand multiparity  AMA (42 @ delivery)   Pt hx of heart murmur   Obesity (Most recent BMI: 33)   Asthma   Short interval pregnancy   - Last delivery 21 (39+2. 6lb 4.5oz)   Hx of cholestasis in recent pregnancy   Social concern of unstable relationship  Food insecurity    See prenatal for labs.  no GBBS, Rubella Immune, RH AB+    She is a 42 year old   Her OB history:   OB History    Para Term  AB Living   8 4 4 0 1 4   SAB IAB Ectopic Multiple Live Births   0 0 0 0 4      # Outcome Date GA Lbr Zach/2nd Weight Sex Delivery Anes PTL Lv   8 Current            7 Term 2006 39w0d   M Vag-Spont Local N FAHAD   6 Term 2002 40w0d  3.629 kg (8 lb) M Vag-Spont None N FAHAD   5 Term 2001 39w0d  3.175 kg (7 lb) F Vag-Spont Local N FAHAD      Birth Comments: PIH   4             3 AB            2 Term            1                      Past Medical History:   Diagnosis Date     Abnormal Pap smear      ASCUS on Pap smear 7/16/10,  10/19/11    neg HPV,  Pos HPV "     Asthma      heart murmur      Heart murmur      hsv 04/23/2015     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  Open reduction internal fixation of left Lisfranc injury 2.  Left second tarsometatarsal joint arthrodesis 3.  Left third tarsometatarsal joint arthrodesis;  Surgeon: Aramis Chacon MD;  Location: [facility] OR     HERNIORRHAPHY UMBILICAL  04/23/2014    Procedure: HERNIORRHAPHY UMBILICAL;  Surgeon: Luz Kinney MD;  Location: [facility] OR     ORTHOPEDIC SURGERY Right 2019    ORIF hand     ZZC NONSPECIFIC PROCEDURE  03/01/2008    wisdom teeth         Current Outpatient Medications   Medication Sig Dispense Refill     acetaminophen (TYLENOL) 500 MG tablet Take 500-1,000 mg by mouth every 6 hours as needed       albuterol (ALBUTEROL) 108 (90 BASE) MCG/ACT inhaler Inhale 1-2 puffs into the lungs every 4 hours as needed for shortness of breath / dyspnea 1 Inhaler 11     Aspirin-Acetaminophen-Caffeine (EXCEDRIN PO) Take 2 tablets by mouth daily as needed (Headaches)       PRENATAL VIT-DOCUSATE-IRON-FA PO Take 1 tablet by mouth daily         Allergies: Asa [aspirin] and Codeine      REVIEW OF SYSTEMS:  NEUROLOGIC:  Negative  EYES:  Negative  ENT:  Negative  GI:  Negative  BREAST:  Negative  :  Negative  GYN:  Negative  CV:  Negative  PULMONARY:  Negative  MUSCULOSKELETAL:  Negative  PSYCH:  Negative        Social History     Socioeconomic History     Marital status: Single     Spouse name: Not on file     Number of children: Not on file     Years of education: Not on file     Highest education level: Not on file   Occupational History     Not on file   Tobacco Use     Smoking status: Never Smoker     Smokeless tobacco: Never Used   Substance and Sexual Activity     Alcohol use: No     Drug use: No     Sexual activity: Yes     Partners: Male     Birth control/protection: Injection   Other Topics  Concern     Parent/sibling w/ CABG, MI or angioplasty before 65F 55M? Not Asked   Social History Narrative     Not on file     Social Determinants of Health     Financial Resource Strain: Not on file   Food Insecurity: Not on file   Transportation Needs: Not on file   Physical Activity: Not on file   Stress: Not on file   Social Connections: Not on file   Intimate Partner Violence: Not on file   Housing Stability: Not on file      Family History   Problem Relation Age of Onset     Diabetes Father      Cancer Paternal Grandmother         lung     Alcohol/Drug Mother      C.A.D. Paternal Grandfather      Cerebrovascular Disease Paternal Grandfather        Vitals:   FHT 150s    Alert Awake in NAD  HEENT grossly normal  Neck: no lymphadenopathy or thryoidomegaly  Lungs CTAB  Back no spinal or CVAT  Heart RRR  ABD gravid, nontender on exam with fundus palpable  Pelvic:  deferred  Neuro:  Grossly intact    Assessment/Plan:  IUP at 18w6 by 8w1 US    fracture of left foot with ORIF and subsequent infection  -Ortho considering I&D 8/8/2022  -continue Zosyn    Pregnancy in midtrimester  -care with [facility]  -avoid NSAIDs  -daily fetal heart dopplers    grand multiparity  -Level 2 US    AMA (42 @ delivery)   -Invitae results not available    Pt hx of heart murmur     Obesity (Most recent BMI: 33)     Asthma   -stable    Short interval pregnancy   - Last delivery 11/2/21 (39+2. 6lb 4.5oz)     Hx of cholestasis in recent pregnancy   -monitor for itching/LFTs    Social concerns  -unstable relationship, food insecurity  -sees [facility]      Randa Foster MD  Dept of OB/GYN  August 8, 2022

## 2022-08-08 NOTE — ANESTHESIA PROCEDURE NOTES
Airway       Patient location during procedure: OR       Procedure Start/Stop Times: 8/8/2022 9:16 AM  Staff -        CRNA: Milagros Valentin APRN CRNA       Performed By: CRNA  Consent for Airway        Urgency: elective  Indications and Patient Condition       Indications for airway management: thony-procedural       Induction type:intravenous       Mask difficulty assessment: 1 - vent by mask    Final Airway Details       Final airway type: endotracheal airway       Successful airway: ETT - single and Oral  Endotracheal Airway Details        ETT size (mm): 7.0       Cuffed: yes       Successful intubation technique: direct laryngoscopy       DL Blade Type: Pa 2       Grade View of Cords: 1       Adjucts: stylet       Position: Right       Measured from: lips       Bite block used: None    Post intubation assessment        Placement verified by: capnometry, equal breath sounds and chest rise        Number of attempts at approach: 1       Number of other approaches attempted: 0       Secured with: plastic tape       Ease of procedure: easy       Dentition: Intact and Unchanged    Medication(s) Administered   Medication Administration Time: 8/8/2022 9:16 AM

## 2022-08-09 ENCOUNTER — APPOINTMENT (OUTPATIENT)
Dept: PHYSICAL THERAPY | Facility: CLINIC | Age: 42
DRG: 818 | End: 2022-08-09
Payer: COMMERCIAL

## 2022-08-09 LAB
CREAT SERPL-MCNC: 0.79 MG/DL (ref 0.51–0.95)
ERYTHROCYTE [SEDIMENTATION RATE] IN BLOOD BY WESTERGREN METHOD: 48 MM/HR (ref 0–20)
GFR SERPL CREATININE-BSD FRML MDRD: >90 ML/MIN/1.73M2
GLUCOSE SERPL-MCNC: 86 MG/DL (ref 70–99)
HGB BLD-MCNC: 8.8 G/DL (ref 11.7–15.7)

## 2022-08-09 PROCEDURE — 82947 ASSAY GLUCOSE BLOOD QUANT: CPT | Performed by: INTERNAL MEDICINE

## 2022-08-09 PROCEDURE — 250N000013 HC RX MED GY IP 250 OP 250 PS 637

## 2022-08-09 PROCEDURE — 999N000127 HC STATISTIC PERIPHERAL IV START W US GUIDANCE

## 2022-08-09 PROCEDURE — 85652 RBC SED RATE AUTOMATED: CPT | Performed by: INTERNAL MEDICINE

## 2022-08-09 PROCEDURE — 99232 SBSQ HOSP IP/OBS MODERATE 35: CPT | Performed by: INTERNAL MEDICINE

## 2022-08-09 PROCEDURE — 97530 THERAPEUTIC ACTIVITIES: CPT | Mod: GP

## 2022-08-09 PROCEDURE — 258N000003 HC RX IP 258 OP 636

## 2022-08-09 PROCEDURE — 36415 COLL VENOUS BLD VENIPUNCTURE: CPT | Performed by: INTERNAL MEDICINE

## 2022-08-09 PROCEDURE — 250N000011 HC RX IP 250 OP 636

## 2022-08-09 PROCEDURE — 85018 HEMOGLOBIN: CPT | Performed by: INTERNAL MEDICINE

## 2022-08-09 PROCEDURE — 250N000011 HC RX IP 250 OP 636: Performed by: INTERNAL MEDICINE

## 2022-08-09 PROCEDURE — L4361 PNEUMA/VAC WALK BOOT PRE OTS: HCPCS

## 2022-08-09 PROCEDURE — 250N000013 HC RX MED GY IP 250 OP 250 PS 637: Performed by: INTERNAL MEDICINE

## 2022-08-09 PROCEDURE — 120N000001 HC R&B MED SURG/OB

## 2022-08-09 PROCEDURE — 97161 PT EVAL LOW COMPLEX 20 MIN: CPT | Mod: GP

## 2022-08-09 PROCEDURE — 82565 ASSAY OF CREATININE: CPT

## 2022-08-09 RX ORDER — MICONAZOLE NITRATE 2 %
1 CREAM WITH APPLICATOR VAGINAL AT BEDTIME
Status: DISCONTINUED | OUTPATIENT
Start: 2022-08-09 | End: 2022-08-12 | Stop reason: HOSPADM

## 2022-08-09 RX ADMIN — SENNOSIDES AND DOCUSATE SODIUM 1 TABLET: 50; 8.6 TABLET ORAL at 08:09

## 2022-08-09 RX ADMIN — POLYETHYLENE GLYCOL 3350 17 G: 17 POWDER, FOR SOLUTION ORAL at 08:08

## 2022-08-09 RX ADMIN — OXYCODONE HYDROCHLORIDE 5 MG: 5 TABLET ORAL at 08:09

## 2022-08-09 RX ADMIN — ACETAMINOPHEN 975 MG: 325 TABLET ORAL at 00:13

## 2022-08-09 RX ADMIN — OXYCODONE HYDROCHLORIDE 10 MG: 5 TABLET ORAL at 00:26

## 2022-08-09 RX ADMIN — TAZOBACTAM SODIUM AND PIPERACILLIN SODIUM 3.38 G: 375; 3 INJECTION, SOLUTION INTRAVENOUS at 13:06

## 2022-08-09 RX ADMIN — OXYCODONE HYDROCHLORIDE 5 MG: 5 TABLET ORAL at 13:16

## 2022-08-09 RX ADMIN — MICONAZOLE NITRATE 1 APPLICATOR: 20 CREAM VAGINAL at 21:40

## 2022-08-09 RX ADMIN — BENZOCAINE 6 MG-MENTHOL 10 MG LOZENGES 1 LOZENGE: at 21:59

## 2022-08-09 RX ADMIN — VANCOMYCIN HYDROCHLORIDE 1250 MG: 10 INJECTION, POWDER, LYOPHILIZED, FOR SOLUTION INTRAVENOUS at 13:50

## 2022-08-09 RX ADMIN — ACETAMINOPHEN 975 MG: 325 TABLET ORAL at 08:08

## 2022-08-09 RX ADMIN — TAZOBACTAM SODIUM AND PIPERACILLIN SODIUM 3.38 G: 375; 3 INJECTION, SOLUTION INTRAVENOUS at 18:30

## 2022-08-09 RX ADMIN — TAZOBACTAM SODIUM AND PIPERACILLIN SODIUM 3.38 G: 375; 3 INJECTION, SOLUTION INTRAVENOUS at 06:26

## 2022-08-09 RX ADMIN — SENNOSIDES AND DOCUSATE SODIUM 1 TABLET: 50; 8.6 TABLET ORAL at 19:42

## 2022-08-09 RX ADMIN — ACETAMINOPHEN 975 MG: 325 TABLET ORAL at 15:52

## 2022-08-09 RX ADMIN — TAZOBACTAM SODIUM AND PIPERACILLIN SODIUM 3.38 G: 375; 3 INJECTION, SOLUTION INTRAVENOUS at 00:13

## 2022-08-09 RX ADMIN — MICONAZOLE NITRATE 1 APPLICATOR: 20 CREAM VAGINAL at 00:57

## 2022-08-09 RX ADMIN — VANCOMYCIN HYDROCHLORIDE 1250 MG: 10 INJECTION, POWDER, LYOPHILIZED, FOR SOLUTION INTRAVENOUS at 02:40

## 2022-08-09 ASSESSMENT — ACTIVITIES OF DAILY LIVING (ADL)
ADLS_ACUITY_SCORE: 30

## 2022-08-09 NOTE — DISCHARGE INSTRUCTIONS
Your home care referral was sent to [facility]  If you haven't heard from them within the next 24-48 hours,  Please call them at [phone]    Your home infusion referral was sent to [facility]  If you haven't heard from them regarding supply delivery, nurse visit, or have questions,  Please call them at [phone]    ________________________________________________________________    ORTHOPEDICS:  Please call Dr Chacon's patient coordinator, Sravani, to schedule your 2 week post-op appointment and also with any questions or concerns.  She can be reached during normal business hours (M-F, 8am-5pm) at [phone].   If you have any questions or concerns after-hours, please contact the on call surgeon at [phone].      If you have concerns regarding your incision, dressing, or any urgent issues that require more immediate attention or an in-person visit, please go to the nearest [facility] location (there are many located throughout the [facility]).  [facility] is open 7 days a week from 8 AM until 8 PM.  No appointment is needed.  Patients are seen on a walk-in basis.     [place] location is across from [facility].  [address]  [phone]  [place] location is nearby [facility].  [address]  [phone]   [place] location [facility].  [address] [place]   [phone]

## 2022-08-09 NOTE — PROGRESS NOTES
VSS on RA. AxOx4. Ax1, pivot to bedside commode. Non-weight bearing on L ankle/foot. Pain controlled overnight with Tylenol and Oxycodone. Pt denies n/v/SOB/chest pain. RPIV running D5 1/2NS w/ 20mEq K @75/hr. Tolerating regular diet. Voiding spontaneously. Pt 19wks pregnant. MD paged regarding medication for vaginal itching, pt has vaginal candidiasis. Order for miconazole cream x7 days, relief noted. Pt will need boot for L ankle/foot prior to discharge. Will continue to provide supportive cares.     Assumed Care: [phone]

## 2022-08-09 NOTE — PLAN OF CARE
"Patient vital signs are at baseline: Yes  Patient able to ambulate as they were prior to admission or with assist devices provided by therapies during their stay:  No,  Reason:  NWBLL  Patient MUST void prior to discharge:  Yes  Patient able to tolerate oral intake:  Yes  Pain has adequate pain control using Oral analgesics:  Yes  Does patient have an identified :  No,  Reason:   Has goal D/C date and time been discussed with patient:  No,  Reason:  TBD.    /50 (BP Location: Left arm)   Pulse 84   Temp 97  F (36.1  C) (Temporal)   Resp 18   Ht 1.626 m (5' 4\")   Wt 89.6 kg (197 lb 9.6 oz)   LMP 04/14/2022 (Approximate)   SpO2 96%   BMI 33.92 kg/m      "

## 2022-08-09 NOTE — PROGRESS NOTES
08/09/22 1024   Quick Adds   Type of Visit Initial PT Evaluation   Living Environment   People in Home child(naty), dependent   Current Living Arrangements house   Home Accessibility stairs within home   Number of Stairs, Within Home, Primary seven   Stair Railings, Within Home, Primary railings safe and in good condition   Transportation Anticipated family or friend will provide   Living Environment Comments Pt lives in a house with her children.   Self-Care   Usual Activity Tolerance good   Current Activity Tolerance poor   Equipment Currently Used at Home   (knee scooter)   Fall history within last six months yes   Number of times patient has fallen within last six months 2   Activity/Exercise/Self-Care Comment Pt IND at baseline, has been using a knee scooter for mobility past few weeks. Pt's mother can provide minimal assist   General Information   Onset of Illness/Injury or Date of Surgery 08/07/22   Referring Physician Sameer Pickering PA-C   Patient/Family Therapy Goals Statement (PT) Return home   Pertinent History of Current Problem (include personal factors and/or comorbidities that impact the POC) Per chart:  Simran Fish is a 42 year old -American female with a significant past medical history of left foot fracture and dislocation which required operative reduction internal fixation by Dr. Chacon on July 13, 2022 who presents with left foot swelling and drainage from incisional wound.   Existing Precautions/Restrictions fall;weight bearing   Weight-Bearing Status - LLE nonweight-bearing   General Observations Pt supine in bed w/ L LE elevated   Cognition   Affect/Mental Status (Cognition) WNL   Orientation Status (Cognition) oriented x 3   Follows Commands (Cognition) WNL   Pain Assessment   Patient Currently in Pain   (Pt reports no pain at rest)   Integumentary/Edema   Integumentary/Edema Comments LE wrapped in ace bandage   Posture    Posture Not impaired   Range of Motion (ROM)   Range  of Motion ROM is WFL   Strength (Manual Muscle Testing)   Strength (Manual Muscle Testing) strength is WNL   Bed Mobility   Comment, (Bed Mobility) Supine to sit SBA   Transfers   Comment, (Transfers) Sit to stand Min A w/ FWW   Gait/Stairs (Locomotion)   Comment, (Gait/Stairs) Pt able to hop 2' w/ FWW and Min A   Balance   Balance Comments Impaired L LE NWB   Sensory Examination   Sensory Perception patient reports no sensory changes   Clinical Impression   Criteria for Skilled Therapeutic Intervention Yes, treatment indicated   PT Diagnosis (PT) Impaired mobility and gait   Influenced by the following impairments Pain, decreased strength, NWB, decreased activity tolerance   Functional limitations due to impairments Impaired mobility   Clinical Presentation (PT Evaluation Complexity) Evolving/Changing   Clinical Presentation Rationale Based on PMH, current status, and social support   Clinical Decision Making (Complexity) low complexity   Planned Therapy Interventions (PT) bed mobility training;gait training;stair training;strengthening   Anticipated Equipment Needs at Discharge (PT) walker, standard   Risk & Benefits of therapy have been explained evaluation/treatment results reviewed;care plan/treatment goals reviewed;risks/benefits reviewed   PT Discharge Planning   PT Discharge Recommendation (DC Rec) home with assist;home with home care physical therapy   PT Rationale for DC Rec PT below baseline for mobility with NWB restrictions, min A for gait and stair navigation, will benefit from 2nd session with PT for stair instruction. SW to assist with inc home support, pt with laundry on lower level, 4 children to care for etc. DC home with home PT   Plan of Care Review   Plan of Care Reviewed With patient   Total Evaluation Time   Total Evaluation Time (Minutes) 10   Physical Therapy Goals   PT Frequency 2x/day   PT Predicted Duration/Target Date for Goal Attainment 08/16/22   PT Goals Bed  Mobility;Transfers;Gait;Stairs   PT: Bed Mobility Modified independent;Supine to/from sit   PT: Transfers Modified independent;Sit to/from stand;Assistive device   PT: Gait Modified independent;Assistive device;25 feet   PT: Stairs Minimal assist;7 stairs;Rail on left

## 2022-08-09 NOTE — PROGRESS NOTES
"[facility]  Infectious Disease Progress Note          Assessment and Plan:   IMPRESSION:     1.  A 42-year-old female with recent left foot Lisfranc fracture with hardware repair, now early postop draining, redness and presumed infection.  No major clinical sepsis, status post operation with cultures pending.  2.  Twenty weeks' pregnancy.     RECOMMENDATIONS:  1 vancomycin and Zosyn.  Continue for now, but await culture and try to simplify, presumably extended antibiotics needed here given the need for fracture repair, hardware in place, etc. So far pure cx staph which fits , sens pending   check IV options        Interval History:   no new complaints and doing well; no cp, sob, n/v/d, or abd beata pain OK cx staph aureus              Medications:       acetaminophen  975 mg Oral Q8H     miconazole  1 applicator Vaginal At Bedtime     piperacillin-tazobactam  3.375 g Intravenous Q6H     polyethylene glycol  17 g Oral Daily     senna-docusate  1 tablet Oral BID     sodium chloride (PF)  3 mL Intracatheter Q8H     vancomycin  1,250 mg Intravenous Q12H                  Physical Exam:   Blood pressure 113/50, pulse 84, temperature 97  F (36.1  C), temperature source Temporal, resp. rate 18, height 1.626 m (5' 4\"), weight 89.6 kg (197 lb 9.6 oz), last menstrual period 04/14/2022, SpO2 96 %, unknown if currently breastfeeding.  Wt Readings from Last 2 Encounters:   08/08/22 89.6 kg (197 lb 9.6 oz)   07/13/22 81.6 kg (180 lb)     Vital Signs with Ranges  Temp:  [97  F (36.1  C)-98.4  F (36.9  C)] 97  F (36.1  C)  Pulse:  [78-85] 84  Resp:  [18-28] 18  BP: (113-130)/(50-70) 113/50  SpO2:  [96 %-100 %] 96 %    Constitutional: Awake, alert, cooperative, no apparent distress   Lungs: Clear to auscultation bilaterally, no crackles or wheezing   Cardiovascular: Regular rate and rhythm, normal S1 and S2, and no murmur noted   Abdomen: Normal bowel sounds, soft, non-distended, non-tender   Skin: No rashes, no " Bulky dressing with Xeroform applied. Patient tolerated well. cyanosis, no edema foot covered   Other:           Data:   All microbiology laboratory data reviewed.  Recent Labs   Lab Test 08/09/22  0632 08/08/22  0631 08/07/22 2125 06/27/22  0654   WBC  --  7.9 10.3 9.4   HGB 8.8* 8.8* 9.1* 9.9*   HCT  --  29.0* 29.7* 31.5*   MCV  --  88 87 85   PLT  --  315 342 360     Recent Labs   Lab Test 08/09/22  0632 08/08/22  0631 08/07/22 2125   CR 0.79 0.58 0.60     Recent Labs   Lab Test 08/09/22  0131   SED 48*     Recent Labs   Lab Test 10/29/17  1141   CULT No Beta Streptococcus isolated

## 2022-08-09 NOTE — PROGRESS NOTES
Here to see patient for short cam boot.  Patient foot/ankle more than 20 plantar flexed.  I attempted to bring to neutral but still only able to get to 20 because pain limited.  Patient currently non weight bearing.  I put in a call/text  to ordering provider Kaelyn Saez with [employer].  Jorge PIPER. .

## 2022-08-09 NOTE — PROGRESS NOTES
I got information from [place] charge nurse that Kaelyn MCKEON said ok to leave boot and she will attempt fit tomorrow.  I returned.  I fit orthosis on right foot/ankle to check for sizing and went over wear, care, possible future ambulation with orthosis.  Contact information given as well.  Please call orthotics if questions.  Carlos PIPER.

## 2022-08-09 NOTE — PROGRESS NOTES
Orthopedic Surgery  Simran Fish  DATE: 2022    Admit Date:  2022    A/P:  Simran Fish is a 42 year old , 19w0d into current pregnancy, who is nearly 4 weeks s/p ORIF left foot Lisfranc injury admitted 2022 with increased drainage from incision site concerning for dehiscence, infection.    #Left foot wound dehiscence s/p I&D and delayed secondary closure, #1 Day Post-Op  No periop complications noted.  Reports significant foot pain, particularly when out of bed.  Is concerned about her ability to work or navigate stairs at home.    -  NWB LLE in CAM (ordered)  -  Patient declined dressing change at time of my visit.  Agreeable to redressing of wound later today by RN.    -  Continue to elevate foot as much as possible to help with swelling  -  Ice frequently!  -  PT to see, will assist with trial of stairs  -  Abx as per ID  -   daily for DVT PPx    #Disposition  -  Await therapy recommendations and Abx plan  -  Follow-up with Dr Chacon in 7-10 days.      Kaelyn Saez [facility]  [phone]  Text Page (7AM - 5PM)    _________________________________________________________    INTERVAL HISTORY:  No overnight events.  Reports foot pain tolerable when not moving it.  Currently on phone facetiming with her youngest child.  Poor participation in physical exam.  Declined dressing removal/change.  Declined ice at this time.  Is concerned about how she will be able to get to work and how she will manage at home.  Doesn't like using the knee scooter.  Doesn't want to trial crutches.  Questions if insurance allows rental of motorized scooters.        PHYSICAL EXAM:  Vital Signs: Temp: 97  F (36.1  C) Temp src: Temporal BP: 113/50 Pulse: 84   Resp: 18 SpO2: 96 % O2 Device: None (Room air) Oxygen Delivery: 7 LPM  I/O last 3 completed shifts:  In: 1120 [P.O.:520; I.V.:600]  Out: 2810 [Urine:2800; Blood:10]  Vitals:    22 0136   Weight: 89.6 kg (197 lb 9.6 oz)        Pleasant, cooperative.  Seems overall quite exhausted.  Nontoxic.  NAD  NCAT.  EOM grossly intact bilaterally. MMM  Respirations unlabored  Mild saturation of 4x4 covering incision but ACE clean and dry.  Patient declined request to remove ACE and redress incision/foot with more streamlined dressing  Mild soft tissue swelling of toes  Poor participation in ROM toes but appears to be intact.   Sensation intact at tip of great toe and pinkie toe  Cap refill brisk      LABORATORY DATA:  I reviewed all medications, new labs and imaging results over the last 24 hours.  Recent Labs   Lab Test 08/09/22 0632 08/08/22 0631 08/07/22 2125 06/27/22  0654   NA  --  136 134* 135   POTASSIUM  --  4.0 3.7 3.2*   BUN  --  5.5* 6.7 14   CR 0.79 0.58 0.60 0.64     Recent Labs   Lab Test 08/09/22 0632 08/08/22 0631 08/07/22 2125 06/27/22  0654 10/29/17  1157 05/13/16  1753   WBC  --  7.9 10.3 9.4 6.9 9.4   HGB 8.8* 8.8* 9.1* 9.9* 12.1 11.5*   PLT  --  315 342 360 442 385     No lab results found.    Results for orders placed or performed during the hospital encounter of 08/07/22   CT Foot Left w Contrast    Narrative    FINAL REPORT    I agree with the preliminary report. Dorsal plate and screw fixation with bone graft at the second and third TMT joints including fixation of the proximal 2nd and 3rd metatarsal fractures. Screw fixation of the medial to middle cuneiform. Ununited   fracture of the base of the 4th metatarsal. No hardware fracture or lucency adjacent to the hardware fixation. No acute fractures are evident. Diffuse soft tissue swelling of the dorsum of the foot with scattered areas of bone graft/osseous debris. No   subcutaneous abscess or subcutaneous emphysema. Additional mild diffuse subcutaneous edema in the ankle. No joint effusions. No retracted tendon tear. Normal alignment of the peroneal tendons.    Final Interpretation Dictated By: Max Abrams MD  Date: 8/8/2022       PRELIMINARY EXAM: CT FOOT  LEFT W CONTRAST  LOCATION: [facility]  DATE/TIME: 8/7/2022 10:42 PM    INDICATION: Surgery, now signs of infection.  COMPARISON: None.  TECHNIQUE: IV contrast. Axial, sagittal and coronal thin-section reconstruction. Dose reduction techniques were used.   CONTRAST: 100 mL Isovue 370.    PRELIMINARY FINDINGS:   Postsurgical change with plate and screw fixation across the second and third tarsometatarsal joints. Additional screw extending from the medial cuneiform to the base of the second metatarsal. Uncertain if ill-defined sclerosis at base of second and   third metatarsals is related to healing of prior fractures. There is mineralization within adjacent soft tissues and soft tissue edema along the dorsal lateral midfoot. Infection not excluded.    Please see final interpretation by [facility] in the a.m.    Preliminary Interpretation Dictated By: Soni Elias MD  Date: 8/7/2022

## 2022-08-09 NOTE — PROGRESS NOTES
[facility]  Hospitalist Progress Note  William Nielsen M.D., M.B.A.   08/09/2022    Reason for Stay/active problem list      Postoperative left foot wound dehiscence s/p left foot debridement by orthopedic surgery's admission    Pregnancy         Assessment and Plan:        Summary of Stay: Simran Fish is a 42 year old -American female with a significant past medical history of left foot fracture and dislocation which required operative reduction internal fixation by Dr. Chacon on July 13, 2022 who presents with left foot swelling and drainage from incisional wound.     On presentation to emergency department blood pressure 129/85, heart rate 89, temperature 99.8.  Respiratory rate 20 and oxygen saturation 98%.     Blood work revealed sodium of 134, calcium 8.4, otherwise unremarkable comprehensive metabolic panel.  WBC count 10.3, hemoglobin 9.1, platelet 342.     CT of the left foot showed mineralization within the adjacent soft tissue and soft tissue edema along the dorsal lateral midfoot.  Further musculoskeletal radiology review pending.     Orthopedic surgery was consulted and Dr. Trejo was contacted who recommended admission and incision and drainage next day.    Patient was taken to the OR on August 8 and had left foot wound debridement.          Problem List with Assessment and Plan:    #1.Postop pain and swelling , erythema and drainage of left foot incisional wound following ORIF of left foot fracture July 13 by Dr Chacon   -- concern for incisional wound infection /deep infection   -- Orthopedic surgery consulted and patient had incision and drainage on August 8.  Patient was continued with antibiotic and infectious disease was consulted to assist with further antibiotic management.  --Currently she is doing well without fever.  Hemodynamically stable.  ID and orthopedic surgery  following for further care plan.  Patient would like to hear from orthopedic surgery what  they found during surgical procedure.  Further antibiotic plan (currently on Zosyn and vancomycin )as per infectious disease service and orthopedic surgery.  Patient wants fungal infection prophylaxis oral antibiotic unfortunately she is pregnant and due to risk of systemic antifungal we are unable to give her Diflucan.  We will treat with topical fungal infections as needed.  -- Postoperative acute blood loss anemia-hemoglobin 8.8 today, transfuse as needed  -- DVT prophylaxis per orthopedic surgery team  -- PT and OT for discharge needs         #2. Midtrimester pregnancy  --Follows with Lyric Hairston in [place].  --OB edition consulted and recommendation noted.  --Plan to avoid NSAIDs and continue daily fetal heart Dopplers          #3.  History of vasovagal syncope with close displaced fracture of metatarsal bone of the left foot June 27, 2022.  -- Seen in the emergency department on June 27 and July 3 acute after a fall.  Patient was subsequently seen by orthopedic surgery and surgical intervention noted on July 13 as above.  -- Currently hemodynamically stable.    #4.  Asthma: Stable    #5.  Social concerns: Social service consulted      Plan for today:    Anticipate orthopedic surgery and infectious the service to see patient for further discussion and recommendations    No antifungal prophylaxis at this point due to pregnancy    Monitor hemoglobin and transfuse as needed    PT and OT consult      VTE Prophylaxis: Pneumatic Compression Devices  Code Status: Full Code  Diet: Advance Diet as Tolerated: Regular Diet Adult    Eric Catheter: Not present    Family updated today: No     Disposition: May need additional 1- 2 until culture result is available and antibiotic plan as well as need for  surgical intervention plan established.        Interval History (Subjective):        Patient is seen and examined by me today and medical record reviewed.Overnight events noted and care discussed with nursing staff.   "Patient is relatively doing well today.  No complaint of pain, fever, shortness of breath or chest pain.  She is wondering about findings during surgery.  We discussed about yeast infection prophylaxis and risk of antifungal for her unborn fetus.                  Physical Exam:        Last Vital Signs:  /50 (BP Location: Left arm)   Pulse 84   Temp 97  F (36.1  C) (Temporal)   Resp 18   Ht 1.626 m (5' 4\")   Wt 89.6 kg (197 lb 9.6 oz)   LMP 04/14/2022 (Approximate)   SpO2 96%   BMI 33.92 kg/m      I/O last 3 completed shifts:  In: 1120 [P.O.:520; I.V.:600]  Out: 2810 [Urine:2800; Blood:10]    Wt Readings from Last 5 Encounters:   08/08/22 89.6 kg (197 lb 9.6 oz)   07/13/22 81.6 kg (180 lb)   04/22/21 81.6 kg (180 lb)   04/26/19 90.7 kg (200 lb)   05/13/16 83.9 kg (185 lb)        Constitutional: Awake, alert, cooperative, no apparent distress     Respiratory: Clear to auscultation bilaterally, no crackles or wheezing   Cardiovascular: Regular rate and rhythm, normal S1 and S2, and no murmur noted   Abdomen: Normal bowel sounds, soft, non-distended, non-tender   Skin: No new rashes, no cyanosis, dry to touch   Neuro: Alert with  no new focal weakness   Extremities:  left foot dressed   Other(s):        All other systems: Negative          Medications:        All current medications were reviewed with changes reflected in problem list.         Data:      All new lab and imaging data was reviewed.      Data reviewed today: I reviewed all new labs and imaging results over the last 24 hours. I personally reviewed       Recent Labs   Lab 08/09/22 0632 08/08/22  0631 08/07/22 2125   WBC  --  7.9 10.3   HGB 8.8* 8.8* 9.1*   HCT  --  29.0* 29.7*   MCV  --  88 87   PLT  --  315 342     No results for input(s): CULT in the last 168 hours.  Recent Labs   Lab 08/09/22 0632 08/08/22 0631 08/07/22 2125   NA  --  136 134*   POTASSIUM  --  4.0 3.7   CHLORIDE  --  108* 103   CO2  --  21* 23   ANIONGAP  --  7 8   GLC " 86 88 84   BUN  --  5.5* 6.7   CR 0.79 0.58 0.60   GFRESTIMATED >90 >90 >90   СВЕТЛАНА  --  7.9* 8.4*   PROTTOTAL  --   --  6.8   ALBUMIN  --   --  3.4*   BILITOTAL  --   --  0.2   ALKPHOS  --   --  68   AST  --   --  13   ALT  --   --  9*       Recent Labs   Lab 08/09/22  0632 08/08/22  0631 08/07/22  2125   GLC 86 88 84       No results for input(s): INR in the last 168 hours.      No results for input(s): TROPONIN, TROPI, TROPR in the last 168 hours.    Invalid input(s): TROP, TROPONINIES    Recent Results (from the past 48 hour(s))   CT Foot Left w Contrast    Narrative    FINAL REPORT    I agree with the preliminary report. Dorsal plate and screw fixation with bone graft at the second and third TMT joints including fixation of the proximal 2nd and 3rd metatarsal fractures. Screw fixation of the medial to middle cuneiform. Ununited   fracture of the base of the 4th metatarsal. No hardware fracture or lucency adjacent to the hardware fixation. No acute fractures are evident. Diffuse soft tissue swelling of the dorsum of the foot with scattered areas of bone graft/osseous debris. No   subcutaneous abscess or subcutaneous emphysema. Additional mild diffuse subcutaneous edema in the ankle. No joint effusions. No retracted tendon tear. Normal alignment of the peroneal tendons.    Final Interpretation Dictated By: Max Abrams MD  Date: 8/8/2022       PRELIMINARY EXAM: CT FOOT LEFT W CONTRAST  LOCATION: [facility]  DATE/TIME: 8/7/2022 10:42 PM    INDICATION: Surgery, now signs of infection.  COMPARISON: None.  TECHNIQUE: IV contrast. Axial, sagittal and coronal thin-section reconstruction. Dose reduction techniques were used.   CONTRAST: 100 mL Isovue 370.    PRELIMINARY FINDINGS:   Postsurgical change with plate and screw fixation across the second and third tarsometatarsal joints. Additional screw extending from the medial cuneiform to the base of the second metatarsal. Uncertain if ill-defined  sclerosis at base of second and   third metatarsals is related to healing of prior fractures. There is mineralization within adjacent soft tissues and soft tissue edema along the dorsal lateral midfoot. Infection not excluded.    Please see final interpretation by [facility] in the a.m.    Preliminary Interpretation Dictated By: Soni Elias MD  Date: 8/7/2022        COVID Status:  COVID-19 PCR Results    COVID-19 PCR Results 11/16/20 4/22/21 4/22/21 11/1/21 1/16/22 7/13/22 8/7/22 2045 2321       COVID-19 Virus by PCR (External Result) Detected (A)  Negative Negative      Flu A/B & SARS-COV-2 PCR Source  Nasopharyngeal        SARS-CoV-2 PCR Result  NEGATIVE        SARS CoV2 PCR     Positive (A) Negative Negative   (A) Abnormal value       Comments are available for some flowsheets but are not being displayed.         COVID-19 Antibody Results, Testing for Immunity    COVID-19 Antibody Results, Testing for Immunity   No data to display.              Disclaimer: This note consists of symbols derived from keyboarding, dictation and/or voice recognition software. As a result, there may be errors in the script that have gone undetected. Please consider this when interpreting information found in this chart.

## 2022-08-09 NOTE — CARE PLAN
Messaged by pharmacy, fluconazole not recommended in pregnant patients. Will hold for now and hospitalist/ID team can reassess in am.

## 2022-08-09 NOTE — PROGRESS NOTES
Cross cover:    Ordering topical miconazole x 7 days for vaginal candidiasis in the setting of pregnancy.  Rounding MD to follow up in the morning.    Jose Andujar MD

## 2022-08-09 NOTE — PLAN OF CARE
"Patient vital signs are at baseline: Yes  Patient able to ambulate as they were prior to admission or with assist devices provided by therapies during their stay:  No,  Reason:  NEED BOOT  Patient MUST void prior to discharge:  Yes  Patient able to tolerate oral intake:  Yes  Pain has adequate pain control using Oral analgesics:  Yes  Does patient have an identified :  No,  Reason:    Has goal D/C date and time been discussed with patient:  No,  Reason:  TBD.  /56 (BP Location: Left arm)   Pulse 85   Temp 98.4  F (36.9  C) (Temporal)   Resp 28   Ht 1.626 m (5' 4\")   Wt 89.6 kg (197 lb 9.6 oz)   LMP 04/14/2022 (Approximate)   SpO2 98%   BMI 33.92 kg/m          "

## 2022-08-09 NOTE — CONSULTS
Care Management Initial Consult    General Information  Assessment completed with: Patient, Patient  Type of CM/SW Visit: Initial Assessment    Primary Care Provider verified and updated as needed: Yes   Readmission within the last 30 days: other (see comments) (Readmission)      Reason for Consult: discharge planning  Advance Care Planning:            Communication Assessment  Patient's communication style: spoken language (English or Bilingual)    Hearing Difficulty or Deaf: no   Wear Glasses or Blind: yes    Cognitive  Cognitive/Neuro/Behavioral: WDL  Level of Consciousness: alert  Arousal Level: opens eyes spontaneously                Living Environment:   People in home: child(naty), dependent     Current living Arrangements: house      Able to return to prior arrangements: yes       Family/Social Support:  Care provided by: self  Provides care for: child(naty)  Marital Status: Single  Significant Other          Description of Support System: Supportive, Involved    Support Assessment: Adequate family and caregiver support    Current Resources:   Patient receiving home care services: Yes  Skilled Home Care Services: Physicial Therapy, Occupational Therapy  Community Resources: None  Equipment currently used at home:  (knee scooter)  Supplies currently used at home:      Employment/Financial:  Employment Status:          Financial Concerns:             Lifestyle & Psychosocial Needs:  Social Determinants of Health     Tobacco Use: Low Risk      Smoking Tobacco Use: Never Smoker     Smokeless Tobacco Use: Never Used   Alcohol Use: Not on file   Financial Resource Strain: Not on file   Food Insecurity: Not on file   Transportation Needs: Not on file   Physical Activity: Not on file   Stress: Not on file   Social Connections: Not on file   Intimate Partner Violence: Not on file   Depression: Not on file   Housing Stability: Not on file       Functional Status:  Prior to admission patient needed assistance:               Mental Health Status:          Chemical Dependency Status:                Values/Beliefs:  Spiritual, Cultural Beliefs, Cheondoism Practices, Values that affect care:                 Additional Information:    Met with pt at bedside to discuss discharge planning. Pt was receiving homecare services through [facility] prior to admission and stated that she is open to resuming these services. Pt explained that she will figure out transportation upon discharge. Per chart review, pt comes from home with her children and significant other. Pt be able to return home with homecare services. This writer will call [facility] to see if they can resume services.     Addendum    Called [facility] who stated they can accept pt back for PT/OT/RN. They request orders be faxed to (F: [phone]) upon discharge.     MARGY Atkinson, [facility]  Inpatient Care Coordination  Ortho/Spine Unit  [phone]  Kitty Ramires, [facility]

## 2022-08-10 ENCOUNTER — APPOINTMENT (OUTPATIENT)
Dept: PHYSICAL THERAPY | Facility: CLINIC | Age: 42
DRG: 818 | End: 2022-08-10
Payer: COMMERCIAL

## 2022-08-10 LAB
ANION GAP SERPL CALCULATED.3IONS-SCNC: 11 MMOL/L (ref 7–15)
BACTERIA UR CULT: ABNORMAL
BACTERIA UR CULT: ABNORMAL
BUN SERPL-MCNC: 7.7 MG/DL (ref 6–20)
CALCIUM SERPL-MCNC: 8.9 MG/DL (ref 8.6–10)
CHLORIDE SERPL-SCNC: 105 MMOL/L (ref 98–107)
CREAT SERPL-MCNC: 1.14 MG/DL (ref 0.51–0.95)
CREAT SERPL-MCNC: 1.2 MG/DL (ref 0.51–0.95)
DEPRECATED HCO3 PLAS-SCNC: 21 MMOL/L (ref 22–29)
ERYTHROCYTE [DISTWIDTH] IN BLOOD BY AUTOMATED COUNT: 15.9 % (ref 10–15)
GFR SERPL CREATININE-BSD FRML MDRD: 58 ML/MIN/1.73M2
GFR SERPL CREATININE-BSD FRML MDRD: 61 ML/MIN/1.73M2
GLUCOSE BLDC GLUCOMTR-MCNC: 92 MG/DL (ref 70–99)
GLUCOSE SERPL-MCNC: 80 MG/DL (ref 70–99)
HCT VFR BLD AUTO: 30 % (ref 35–47)
HGB BLD-MCNC: 9.1 G/DL (ref 11.7–15.7)
HGB BLD-MCNC: 9.1 G/DL (ref 11.7–15.7)
MCH RBC QN AUTO: 26.8 PG (ref 26.5–33)
MCHC RBC AUTO-ENTMCNC: 30.3 G/DL (ref 31.5–36.5)
MCV RBC AUTO: 89 FL (ref 78–100)
PLATELET # BLD AUTO: 308 10E3/UL (ref 150–450)
POTASSIUM SERPL-SCNC: 4.1 MMOL/L (ref 3.4–5.3)
RBC # BLD AUTO: 3.39 10E6/UL (ref 3.8–5.2)
SODIUM SERPL-SCNC: 137 MMOL/L (ref 136–145)
WBC # BLD AUTO: 10.4 10E3/UL (ref 4–11)

## 2022-08-10 PROCEDURE — 250N000011 HC RX IP 250 OP 636: Performed by: INTERNAL MEDICINE

## 2022-08-10 PROCEDURE — 250N000011 HC RX IP 250 OP 636

## 2022-08-10 PROCEDURE — 120N000001 HC R&B MED SURG/OB

## 2022-08-10 PROCEDURE — 250N000013 HC RX MED GY IP 250 OP 250 PS 637

## 2022-08-10 PROCEDURE — 85027 COMPLETE CBC AUTOMATED: CPT | Performed by: PHYSICIAN ASSISTANT

## 2022-08-10 PROCEDURE — 82565 ASSAY OF CREATININE: CPT

## 2022-08-10 PROCEDURE — 99233 SBSQ HOSP IP/OBS HIGH 50: CPT | Performed by: INTERNAL MEDICINE

## 2022-08-10 PROCEDURE — 99232 SBSQ HOSP IP/OBS MODERATE 35: CPT | Performed by: INTERNAL MEDICINE

## 2022-08-10 PROCEDURE — 258N000003 HC RX IP 258 OP 636

## 2022-08-10 PROCEDURE — 36415 COLL VENOUS BLD VENIPUNCTURE: CPT | Performed by: INTERNAL MEDICINE

## 2022-08-10 PROCEDURE — 85018 HEMOGLOBIN: CPT | Performed by: INTERNAL MEDICINE

## 2022-08-10 PROCEDURE — 80048 BASIC METABOLIC PNL TOTAL CA: CPT | Performed by: PHYSICIAN ASSISTANT

## 2022-08-10 PROCEDURE — 250N000011 HC RX IP 250 OP 636: Performed by: HOSPITALIST

## 2022-08-10 PROCEDURE — 97530 THERAPEUTIC ACTIVITIES: CPT | Mod: GP | Performed by: PHYSICAL THERAPIST

## 2022-08-10 PROCEDURE — 250N000011 HC RX IP 250 OP 636: Performed by: PHYSICIAN ASSISTANT

## 2022-08-10 RX ORDER — CLINDAMYCIN PHOSPHATE 900 MG/50ML
900 INJECTION, SOLUTION INTRAVENOUS EVERY 8 HOURS
Status: DISCONTINUED | OUTPATIENT
Start: 2022-08-10 | End: 2022-08-12 | Stop reason: HOSPADM

## 2022-08-10 RX ORDER — ENOXAPARIN SODIUM 100 MG/ML
40 INJECTION SUBCUTANEOUS DAILY
Qty: 5.6 ML | Refills: 0 | Status: SHIPPED | OUTPATIENT
Start: 2022-08-10 | End: 2022-08-24

## 2022-08-10 RX ORDER — ENOXAPARIN SODIUM 100 MG/ML
40 INJECTION SUBCUTANEOUS EVERY 24 HOURS
Status: DISCONTINUED | OUTPATIENT
Start: 2022-08-10 | End: 2022-08-12 | Stop reason: HOSPADM

## 2022-08-10 RX ORDER — ACETAMINOPHEN 500 MG
TABLET ORAL
COMMUNITY
Start: 2022-08-10 | End: 2023-09-13

## 2022-08-10 RX ORDER — AMOXICILLIN 250 MG
1-2 CAPSULE ORAL 2 TIMES DAILY
Qty: 30 TABLET | Refills: 0 | Status: SHIPPED | OUTPATIENT
Start: 2022-08-10 | End: 2023-09-13

## 2022-08-10 RX ORDER — OXYCODONE HYDROCHLORIDE 5 MG/1
5 TABLET ORAL EVERY 4 HOURS PRN
Qty: 15 TABLET | Refills: 0 | Status: SHIPPED | OUTPATIENT
Start: 2022-08-10 | End: 2023-09-13

## 2022-08-10 RX ADMIN — TAZOBACTAM SODIUM AND PIPERACILLIN SODIUM 3.38 G: 375; 3 INJECTION, SOLUTION INTRAVENOUS at 17:54

## 2022-08-10 RX ADMIN — CLINDAMYCIN PHOSPHATE 900 MG: 900 INJECTION, SOLUTION INTRAVENOUS at 23:07

## 2022-08-10 RX ADMIN — ACETAMINOPHEN 975 MG: 325 TABLET ORAL at 00:20

## 2022-08-10 RX ADMIN — MICONAZOLE NITRATE 1 APPLICATOR: 20 CREAM VAGINAL at 21:58

## 2022-08-10 RX ADMIN — SENNOSIDES AND DOCUSATE SODIUM 1 TABLET: 50; 8.6 TABLET ORAL at 08:33

## 2022-08-10 RX ADMIN — ENOXAPARIN SODIUM 40 MG: 40 INJECTION SUBCUTANEOUS at 16:32

## 2022-08-10 RX ADMIN — ACETAMINOPHEN 975 MG: 325 TABLET ORAL at 08:33

## 2022-08-10 RX ADMIN — TAZOBACTAM SODIUM AND PIPERACILLIN SODIUM 3.38 G: 375; 3 INJECTION, SOLUTION INTRAVENOUS at 00:20

## 2022-08-10 RX ADMIN — ACETAMINOPHEN 975 MG: 325 TABLET ORAL at 16:31

## 2022-08-10 RX ADMIN — TAZOBACTAM SODIUM AND PIPERACILLIN SODIUM 3.38 G: 375; 3 INJECTION, SOLUTION INTRAVENOUS at 12:20

## 2022-08-10 RX ADMIN — VANCOMYCIN HYDROCHLORIDE 1250 MG: 10 INJECTION, POWDER, LYOPHILIZED, FOR SOLUTION INTRAVENOUS at 01:05

## 2022-08-10 RX ADMIN — TAZOBACTAM SODIUM AND PIPERACILLIN SODIUM 3.38 G: 375; 3 INJECTION, SOLUTION INTRAVENOUS at 05:48

## 2022-08-10 RX ADMIN — POLYETHYLENE GLYCOL 3350 17 G: 17 POWDER, FOR SOLUTION ORAL at 08:33

## 2022-08-10 ASSESSMENT — ACTIVITIES OF DAILY LIVING (ADL)
ADLS_ACUITY_SCORE: 28
ADLS_ACUITY_SCORE: 30
ADLS_ACUITY_SCORE: 28
ADLS_ACUITY_SCORE: 28
ADLS_ACUITY_SCORE: 30
ADLS_ACUITY_SCORE: 28
ADLS_ACUITY_SCORE: 30
ADLS_ACUITY_SCORE: 30

## 2022-08-10 NOTE — PLAN OF CARE
"Patient vital signs are at baseline: Yes  Patient able to ambulate as they were prior to admission or with assist devices provided by therapies during their stay:  Yes, scooter,   Patient MUST void prior to discharge:  Yes  Patient able to tolerate oral intake:  Yes  Pain has adequate pain control using Oral analgesics:  Yes  Does patient have an identified :  Yes  Has goal D/C date and time been discussed with patient:  No,  Reason:  need to be clear from MD., Dressing on left foot was changed today, pt. Wears boot on left leg.    /69 (BP Location: Right arm)   Pulse 78   Temp 97.4  F (36.3  C) (Temporal)   Resp 18   Ht 1.626 m (5' 4\")   Wt 89.6 kg (197 lb 9.6 oz)   LMP 04/14/2022 (Approximate)   SpO2 97%   BMI 33.92 kg/m      "

## 2022-08-10 NOTE — PLAN OF CARE
Goal Outcome Evaluation:        Patient vital signs are at baseline: Yes  Patient able to ambulate as they were prior to admission or with assist devices provided by therapies during their stay:  Yes  Patient MUST void prior to discharge:  Yes  Patient able to tolerate oral intake:  Yes  Pain has adequate pain control using Oral analgesics:  Yes  Does patient have an identified :  Yes  Has goal D/C date and time been discussed with patient:  Yes           Patient aox4. VSS on RA. 20 weeks pregnant. Scheduled tylenol given for pain. Up with assist of 1 gaitbelt/walker to the bedside commode. NWB LLE. Will be fit for CAM boot tomorrow. Ace wrap CDI. On IV zosyn and vancomycin. Possible PICC pending.

## 2022-08-10 NOTE — PROGRESS NOTES
"[facility]  Infectious Disease Progress Note          Assessment and Plan:   IMPRESSION:     1.  A 42-year-old female with recent left foot Lisfranc fracture with hardware repair, now early postop draining, redness and presumed infection.  No major clinical sepsis, status post operation with cultures pending.  2.  Twenty weeks' pregnancy.     RECOMMENDATIONS:  1 vancomycin discontinue creat rising, for now Zosyn.  Continue for now, but await culture and try to simplify, presumably extended antibiotics needed here given the need for fracture to heal and hardware in, await sens and adjust   check IV options hold on line 1 more day for final cx        Interval History:   no new complaints and doing well; no cp, sob, n/v/d, or abd beata pain OK cx staph aureus              Medications:       acetaminophen  975 mg Oral Q8H     miconazole  1 applicator Vaginal At Bedtime     piperacillin-tazobactam  3.375 g Intravenous Q6H     polyethylene glycol  17 g Oral Daily     senna-docusate  1 tablet Oral BID     sodium chloride (PF)  3 mL Intracatheter Q8H                  Physical Exam:   Blood pressure 127/69, pulse 78, temperature 97.4  F (36.3  C), temperature source Temporal, resp. rate 18, height 1.626 m (5' 4\"), weight 89.6 kg (197 lb 9.6 oz), last menstrual period 04/14/2022, SpO2 97 %, unknown if currently breastfeeding.  Wt Readings from Last 2 Encounters:   08/08/22 89.6 kg (197 lb 9.6 oz)   07/13/22 81.6 kg (180 lb)     Vital Signs with Ranges  Temp:  [97.4  F (36.3  C)-98.3  F (36.8  C)] 97.4  F (36.3  C)  Pulse:  [78-84] 78  Resp:  [18] 18  BP: (120-127)/(69-78) 127/69  SpO2:  [97 %-98 %] 97 %    Constitutional: Awake, alert, cooperative, no apparent distress   Lungs: Clear to auscultation bilaterally, no crackles or wheezing   Cardiovascular: Regular rate and rhythm, normal S1 and S2, and no murmur noted   Abdomen: Normal bowel sounds, soft, non-distended, non-tender   Skin: No rashes, no " cyanosis, no edema foot covered   Other:           Data:   All microbiology laboratory data reviewed.  Recent Labs   Lab Test 08/10/22  0731 08/09/22  0632 08/08/22  0631 08/07/22  2125   WBC 10.4  --  7.9 10.3   HGB 9.1*  9.1* 8.8* 8.8* 9.1*   HCT 30.0*  --  29.0* 29.7*   MCV 89  --  88 87     --  315 342     Recent Labs   Lab Test 08/10/22  0731 08/09/22  0632 08/08/22  0631   CR 1.14*  1.20* 0.79 0.58     Recent Labs   Lab Test 08/09/22  0131   SED 48*     Recent Labs   Lab Test 10/29/17  1141   CULT No Beta Streptococcus isolated

## 2022-08-10 NOTE — PROGRESS NOTES
Orthopedic Surgery  Simran Fish  DATE: 08/10/2022    Admit Date:  2022    A/P:  Simran Fish is a 42 year old , 19w1d into current pregnancy, who is nearly 4 weeks s/p ORIF left foot Lisfranc injury admitted 2022 with increased drainage from incision site concerning for dehiscence, infection.    #Left foot wound dehiscence s/p I&D and delayed secondary closure, #2 Days Post-Op  -  NWB LLE in CAM.  Pt reportedly unable to get right ankle to 90 degrees dorsiflexion yesterday but can today.   -  In boot as much as possible to prevent flexion contracture  -  Dressing changes daily and PRN  -  Elevate, ice  -  PT/SW  -  Abx as per ID (currently Vanc/Zosyn; MAGDA noted - Dr Mondragon to assess)   -  Intraop cultures growing 3+ staph aureus and 2+ anaerococcus.   -  Anticipate home with PICC and IV Abx for suppressive Abx until fracture fully healed and hardware removed  -   daily for DVT PPx    #Disposition  -  Await Abx choice/plan  -  Follow-up with Dr Chacon in 10-14 days.      Kaelyn Saez [facility]  [phone]  Text Page (7AM - 5PM)    _________________________________________________________    INTERVAL HISTORY:  No overnight events.  Had a tough morning yesterday but today seems better.  Refused dressing change for me yesterday but allowed RN to change it.  Has been agreeable to mobilizing in room with assistance today and will be working with therapies later on for stairs and scooter training.  Pain medications appear to be working well.  Also reports swelling better and she is able to dorsiflex ankle to 90 degrees.        PHYSICAL EXAM:  Vital Signs: Temp: 97.4  F (36.3  C) Temp src: Temporal BP: 127/69 Pulse: 78   Resp: 18 SpO2: 97 % O2 Device: None (Room air)    I/O last 3 completed shifts:  In: 400 [P.O.:400]  Out: 1650 [Urine:1650]  Vitals:    08/08/22 0136   Weight: 89.6 kg (197 lb 9.6 oz)       Pleasant, cooperative  Respirations full, unlabored  Moving ext x4,  CMS intact.  Mild left foot/ankle edema.  ACE wrap c/d/i  AOx3.  CN2-12 grossly intact.  NVI    LABORATORY DATA:  I reviewed all medications, new labs and imaging results over the last 24 hours.  Recent Labs   Lab Test 08/10/22  0731 08/09/22  0632 08/08/22  0631 08/07/22  2125 06/27/22  0654   NA  --   --  136 134* 135   POTASSIUM  --   --  4.0 3.7 3.2*   BUN  --   --  5.5* 6.7 14   CR 1.20* 0.79 0.58 0.60 0.64     Recent Labs   Lab Test 08/10/22  0731 08/09/22  0632 08/08/22  0631 08/07/22  2125 06/27/22  0654 10/29/17  1157 05/13/16  1753   WBC  --   --  7.9 10.3 9.4 6.9 9.4   HGB 9.1* 8.8* 8.8* 9.1* 9.9* 12.1 11.5*   PLT  --   --  315 342 360 442 385     No lab results found.    Results for orders placed or performed during the hospital encounter of 08/07/22   CT Foot Left w Contrast    Narrative    FINAL REPORT    I agree with the preliminary report. Dorsal plate and screw fixation with bone graft at the second and third TMT joints including fixation of the proximal 2nd and 3rd metatarsal fractures. Screw fixation of the medial to middle cuneiform. Ununited   fracture of the base of the 4th metatarsal. No hardware fracture or lucency adjacent to the hardware fixation. No acute fractures are evident. Diffuse soft tissue swelling of the dorsum of the foot with scattered areas of bone graft/osseous debris. No   subcutaneous abscess or subcutaneous emphysema. Additional mild diffuse subcutaneous edema in the ankle. No joint effusions. No retracted tendon tear. Normal alignment of the peroneal tendons.    Final Interpretation Dictated By: Max Abrams MD  Date: 8/8/2022       PRELIMINARY EXAM: CT FOOT LEFT W CONTRAST  LOCATION: [facility]  DATE/TIME: 8/7/2022 10:42 PM    INDICATION: Surgery, now signs of infection.  COMPARISON: None.  TECHNIQUE: IV contrast. Axial, sagittal and coronal thin-section reconstruction. Dose reduction techniques were used.   CONTRAST: 100 mL Isovue  370.    PRELIMINARY FINDINGS:   Postsurgical change with plate and screw fixation across the second and third tarsometatarsal joints. Additional screw extending from the medial cuneiform to the base of the second metatarsal. Uncertain if ill-defined sclerosis at base of second and   third metatarsals is related to healing of prior fractures. There is mineralization within adjacent soft tissues and soft tissue edema along the dorsal lateral midfoot. Infection not excluded.    Please see final interpretation by MSK in the a.m.    Preliminary Interpretation Dictated By: Soni Elias MD  Date: 8/7/2022

## 2022-08-10 NOTE — PLAN OF CARE
Goal Outcome Evaluation:        VSS, patient appeared to rest on and off through NOC, patient CMS intact, patient remians NWBA, patient remains on bax, awaiting cultures, no acute needs seen this shfit will cont to monitor

## 2022-08-10 NOTE — PROGRESS NOTES
[facility]  Hospitalist Progress Note  Elia Sierra MD.   08/10/2022    Reason for Stay/active problem list      Postoperative left foot wound dehiscence s/p left foot debridement by orthopedic surgery's admission    Pregnancy         Assessment and Plan:        Summary of Stay: Simran Fish is a 42 year old -American female with a significant past medical history of left foot fracture and dislocation which required operative reduction internal fixation by Dr. Chacon on July 13, 2022 who presents with left foot swelling and drainage from incisional wound.     On presentation to emergency department blood pressure 129/85, heart rate 89, temperature 99.8.  Respiratory rate 20 and oxygen saturation 98%.     Blood work revealed sodium of 134, calcium 8.4, otherwise unremarkable comprehensive metabolic panel.  WBC count 10.3, hemoglobin 9.1, platelet 342.     CT of the left foot showed mineralization within the adjacent soft tissue and soft tissue edema along the dorsal lateral midfoot.  Further musculoskeletal radiology review pending.     Orthopedic surgery was consulted and Dr. Trejo was contacted who recommended admission and incision and drainage next day.    Patient was taken to the OR on August 8 and had left foot wound debridement.    Problem List with Assessment and Plan:    1.  Status post left foot wound debridement with delayed secondary closure.  -Patient developed pain and swelling.  Erythema or drainage of the left foot incisional wound following ORIF of left foot fracture on  July 13.   -There was concern for wound infection/deep infection and underwent left foot wound debridement on 8/8/20/2022.  -Orthopedic surgery input appreciated.  -.Continue vancomycin and Zosyn per ID recommendation.  -Wound culture grew staph aureus, pending sensitivity.  -The anaerobic culture also grew anaerococcus species  -She is afebrile and hemodynamically stable.    -ID and orthopedic  "surgery  following for further care plan.    - Patient wants fungal infection prophylaxis oral antibiotic unfortunately she is pregnant and due to risk of systemic antifungal we are unable to give her Diflucan.  We will treat with topical fungal infections as needed.  -Postoperative acute blood loss anemia-hemoglobin stable no indication for transfusion.  -DVT prophylaxis per orthopedic surgery team  -PT and OT for discharge needs.    2. Midtrimester pregnancy.  --Follows with [facility] in [place].  --OB edition consulted and recommendation noted.  --Plan to avoid NSAIDs and continue daily fetal heart Dopplers     3.  History of vasovagal syncope with close displaced fracture of metatarsal bone of the left foot June 27, 2022.  - Seen in the emergency department on June 27 and July 3 acute after a fall.  Patient was subsequently seen by orthopedic surgery and surgical intervention noted on July 13 as above.  - Currently hemodynamically stable.    4.  Asthma: Stable    5.  MAGDA: Baseline creatinine was 0.79, increased to 1.20 today.  -This is likely due to medication/Vancomycin.  -Encourage oral intake of fluids  -BMP in the morning.    6. UTI: UA was mildly positive, urine culture grew out E. Coli.  -Patient is asymptomatic  -She is already on IV antibiotic Zosyn which will be continued    Clinically Significant Risk Factors Present on Admission               # Obesity: Estimated body mass index is 33.92 kg/m  as calculated from the following:    Height as of this encounter: 1.626 m (5' 4\").    Weight as of this encounter: 89.6 kg (197 lb 9.6 oz).        VTE Prophylaxis: Pneumatic Compression Devices  Code Status: Full Code  Diet: Advance Diet as Tolerated: Regular Diet Adult    Eric Catheter: Not present    Family updated today: No     Disposition: May need additional 1- 2 until culture result is available and antibiotic plan as well as need for  surgical intervention plan established.        Interval History " "(Subjective):        Patient is seen and examined, assumed care today, chart, medication, labs reviewed.  Pain is fairly controlled, she denies any new complaint.                  Physical Exam:        Last Vital Signs:  /69 (BP Location: Right arm)   Pulse 78   Temp 97.4  F (36.3  C) (Temporal)   Resp 18   Ht 1.626 m (5' 4\")   Wt 89.6 kg (197 lb 9.6 oz)   LMP 04/14/2022 (Approximate)   SpO2 97%   BMI 33.92 kg/m      I/O last 3 completed shifts:  In: 400 [P.O.:400]  Out: 1650 [Urine:1650]    Wt Readings from Last 5 Encounters:   08/08/22 89.6 kg (197 lb 9.6 oz)   07/13/22 81.6 kg (180 lb)   04/22/21 81.6 kg (180 lb)   04/26/19 90.7 kg (200 lb)   05/13/16 83.9 kg (185 lb)        Constitutional: Awake, alert, cooperative, no apparent distress     Respiratory: Clear to auscultation bilaterally, no crackles or wheezing   Cardiovascular: Regular rate and rhythm, normal S1 and S2, and no murmur noted   Abdomen: Normal bowel sounds, soft, non-distended, non-tender   Skin: No new rashes, no cyanosis, dry to touch   Neuro: Alert with  no new focal weakness   Extremities: Left foot dressed and immobilized, drainage in place.   Other(s):        All other systems: Negative          Medications:        All current medications were reviewed with changes reflected in problem list.  Current Facility-Administered Medications   Medication     [START ON 8/11/2022] acetaminophen (TYLENOL) tablet 650 mg     acetaminophen (TYLENOL) tablet 975 mg     albuterol (PROVENTIL HFA/VENTOLIN HFA) inhaler     benzocaine-menthol (CHLORASEPTIC) 6-10 MG lozenge 1 lozenge     bisacodyl (DULCOLAX) suppository 10 mg     HYDROmorphone (DILAUDID) injection 0.2 mg    Or     HYDROmorphone (DILAUDID) injection 0.4 mg     lidocaine (LMX4) kit     lidocaine 1 % 0.1-1 mL     magnesium hydroxide (MILK OF MAGNESIA) suspension 30 mL     melatonin tablet 1 mg     miconazole (MICATIN) 2 % cream 1 applicator     naloxone (NARCAN) injection 0.2 mg    Or "     naloxone (NARCAN) injection 0.4 mg    Or     naloxone (NARCAN) injection 0.2 mg    Or     naloxone (NARCAN) injection 0.4 mg     ondansetron (ZOFRAN ODT) ODT tab 4 mg    Or     ondansetron (ZOFRAN) injection 4 mg     oxyCODONE (ROXICODONE) tablet 5 mg    Or     oxyCODONE (ROXICODONE) tablet 10 mg     piperacillin-tazobactam (ZOSYN) infusion 3.375 g     polyethylene glycol (MIRALAX) Packet 17 g     prochlorperazine (COMPAZINE) injection 10 mg    Or     prochlorperazine (COMPAZINE) tablet 10 mg    Or     prochlorperazine (COMPAZINE) suppository 25 mg     senna-docusate (SENOKOT-S/PERICOLACE) 8.6-50 MG per tablet 1 tablet     sodium chloride (PF) 0.9% PF flush 3 mL     sodium chloride (PF) 0.9% PF flush 3 mL          Data:      All new lab and imaging data was reviewed.      Data reviewed today: I reviewed all new labs and imaging results over the last 24 hours. I personally reviewed   Recent Labs   Lab 08/10/22  0731 08/09/22  0632 08/08/22 0631 08/07/22  2125   WBC 10.4  --  7.9 10.3   HGB 9.1*  9.1* 8.8* 8.8* 9.1*   HCT 30.0*  --  29.0* 29.7*   MCV 89  --  88 87     --  315 342     No results for input(s): CULT in the last 168 hours.  Recent Labs   Lab 08/10/22  0731 08/10/22  0538 08/09/22  0632 08/08/22  0631 08/07/22  2125     --   --  136 134*   POTASSIUM 4.1  --   --  4.0 3.7   CHLORIDE 105  --   --  108* 103   CO2 21*  --   --  21* 23   ANIONGAP 11  --   --  7 8   GLC 80 92 86 88 84   BUN 7.7  --   --  5.5* 6.7   CR 1.14*  1.20*  --  0.79 0.58 0.60   GFRESTIMATED 61  58*  --  >90 >90 >90   СВЕТЛАНА 8.9  --   --  7.9* 8.4*   PROTTOTAL  --   --   --   --  6.8   ALBUMIN  --   --   --   --  3.4*   BILITOTAL  --   --   --   --  0.2   ALKPHOS  --   --   --   --  68   AST  --   --   --   --  13   ALT  --   --   --   --  9*       Recent Labs   Lab 08/10/22  0731 08/10/22  0538 08/09/22  0632 08/08/22  0631 08/07/22  2125   GLC 80 92 86 88 84       No results for input(s): INR in the last 168  hours.      No results for input(s): TROPONIN, TROPI, TROPR in the last 168 hours.    Invalid input(s): TROP, TROPONINIES    Recent Results (from the past 48 hour(s))   CT Foot Left w Contrast    Narrative    FINAL REPORT    I agree with the preliminary report. Dorsal plate and screw fixation with bone graft at the second and third TMT joints including fixation of the proximal 2nd and 3rd metatarsal fractures. Screw fixation of the medial to middle cuneiform. Ununited   fracture of the base of the 4th metatarsal. No hardware fracture or lucency adjacent to the hardware fixation. No acute fractures are evident. Diffuse soft tissue swelling of the dorsum of the foot with scattered areas of bone graft/osseous debris. No   subcutaneous abscess or subcutaneous emphysema. Additional mild diffuse subcutaneous edema in the ankle. No joint effusions. No retracted tendon tear. Normal alignment of the peroneal tendons.    Final Interpretation Dictated By: Max Abrams MD  Date: 8/8/2022       PRELIMINARY EXAM: CT FOOT LEFT W CONTRAST  LOCATION: [facility]  DATE/TIME: 8/7/2022 10:42 PM    INDICATION: Surgery, now signs of infection.  COMPARISON: None.  TECHNIQUE: IV contrast. Axial, sagittal and coronal thin-section reconstruction. Dose reduction techniques were used.   CONTRAST: 100 mL Isovue 370.    PRELIMINARY FINDINGS:   Postsurgical change with plate and screw fixation across the second and third tarsometatarsal joints. Additional screw extending from the medial cuneiform to the base of the second metatarsal. Uncertain if ill-defined sclerosis at base of second and   third metatarsals is related to healing of prior fractures. There is mineralization within adjacent soft tissues and soft tissue edema along the dorsal lateral midfoot. Infection not excluded.    Please see final interpretation by MSK in the a.m.    Preliminary Interpretation Dictated By: Soni Elias MD  Date: 8/7/2022         COVID Status:  COVID-19 PCR Results    COVID-19 PCR Results 11/16/20 4/22/21 4/22/21 11/1/21 1/16/22 7/13/22 8/7/22     [id number]       COVID-19 Virus by PCR (External Result) Detected (A)  Negative Negative      Flu A/B & SARS-COV-2 PCR Source  Nasopharyngeal        SARS-CoV-2 PCR Result  NEGATIVE        SARS CoV2 PCR     Positive (A) Negative Negative   (A) Abnormal value       Comments are available for some flowsheets but are not being displayed.         COVID-19 Antibody Results, Testing for Immunity    COVID-19 Antibody Results, Testing for Immunity   No data to display.

## 2022-08-11 ENCOUNTER — HOME INFUSION (PRE-WILLOW HOME INFUSION) (OUTPATIENT)
Dept: PHARMACY | Facility: CLINIC | Age: 42
End: 2022-08-11

## 2022-08-11 LAB
ANION GAP SERPL CALCULATED.3IONS-SCNC: 7 MMOL/L (ref 7–15)
BACTERIA TISS BX CULT: ABNORMAL
BACTERIA TISS BX CULT: ABNORMAL
BUN SERPL-MCNC: 9.9 MG/DL (ref 6–20)
CALCIUM SERPL-MCNC: 8.8 MG/DL (ref 8.6–10)
CHLORIDE SERPL-SCNC: 105 MMOL/L (ref 98–107)
CREAT SERPL-MCNC: 1.26 MG/DL (ref 0.51–0.95)
DEPRECATED HCO3 PLAS-SCNC: 24 MMOL/L (ref 22–29)
GFR SERPL CREATININE-BSD FRML MDRD: 54 ML/MIN/1.73M2
GLUCOSE SERPL-MCNC: 82 MG/DL (ref 70–99)
POTASSIUM SERPL-SCNC: 4.2 MMOL/L (ref 3.4–5.3)
SODIUM SERPL-SCNC: 136 MMOL/L (ref 136–145)

## 2022-08-11 PROCEDURE — 250N000011 HC RX IP 250 OP 636: Performed by: PHYSICIAN ASSISTANT

## 2022-08-11 PROCEDURE — 36415 COLL VENOUS BLD VENIPUNCTURE: CPT | Performed by: INTERNAL MEDICINE

## 2022-08-11 PROCEDURE — 250N000011 HC RX IP 250 OP 636: Performed by: INTERNAL MEDICINE

## 2022-08-11 PROCEDURE — 250N000011 HC RX IP 250 OP 636: Performed by: HOSPITALIST

## 2022-08-11 PROCEDURE — 99232 SBSQ HOSP IP/OBS MODERATE 35: CPT | Performed by: INTERNAL MEDICINE

## 2022-08-11 PROCEDURE — 272N000034 HC KIT VALVED SINGLE LUMEN

## 2022-08-11 PROCEDURE — 36569 INSJ PICC 5 YR+ W/O IMAGING: CPT

## 2022-08-11 PROCEDURE — 120N000001 HC R&B MED SURG/OB

## 2022-08-11 PROCEDURE — 80048 BASIC METABOLIC PNL TOTAL CA: CPT | Performed by: INTERNAL MEDICINE

## 2022-08-11 PROCEDURE — 250N000013 HC RX MED GY IP 250 OP 250 PS 637

## 2022-08-11 PROCEDURE — 99233 SBSQ HOSP IP/OBS HIGH 50: CPT | Performed by: INTERNAL MEDICINE

## 2022-08-11 RX ORDER — CLINDAMYCIN PHOSPHATE 900 MG/50ML
900 INJECTION, SOLUTION INTRAVENOUS EVERY 8 HOURS
Qty: 4200 ML | Refills: 0 | Status: SHIPPED | OUTPATIENT
Start: 2022-08-11 | End: 2022-09-08

## 2022-08-11 RX ADMIN — OXYCODONE HYDROCHLORIDE 5 MG: 5 TABLET ORAL at 19:50

## 2022-08-11 RX ADMIN — SENNOSIDES AND DOCUSATE SODIUM 1 TABLET: 50; 8.6 TABLET ORAL at 07:41

## 2022-08-11 RX ADMIN — MICONAZOLE NITRATE 1 APPLICATOR: 20 CREAM VAGINAL at 23:01

## 2022-08-11 RX ADMIN — CLINDAMYCIN PHOSPHATE 900 MG: 900 INJECTION, SOLUTION INTRAVENOUS at 14:38

## 2022-08-11 RX ADMIN — SENNOSIDES AND DOCUSATE SODIUM 1 TABLET: 50; 8.6 TABLET ORAL at 19:45

## 2022-08-11 RX ADMIN — ACETAMINOPHEN 975 MG: 325 TABLET ORAL at 00:43

## 2022-08-11 RX ADMIN — TAZOBACTAM SODIUM AND PIPERACILLIN SODIUM 3.38 G: 375; 3 INJECTION, SOLUTION INTRAVENOUS at 00:43

## 2022-08-11 RX ADMIN — CLINDAMYCIN PHOSPHATE 900 MG: 900 INJECTION, SOLUTION INTRAVENOUS at 07:00

## 2022-08-11 RX ADMIN — TAZOBACTAM SODIUM AND PIPERACILLIN SODIUM 3.38 G: 375; 3 INJECTION, SOLUTION INTRAVENOUS at 06:31

## 2022-08-11 RX ADMIN — ENOXAPARIN SODIUM 40 MG: 40 INJECTION SUBCUTANEOUS at 15:54

## 2022-08-11 RX ADMIN — POLYETHYLENE GLYCOL 3350 17 G: 17 POWDER, FOR SOLUTION ORAL at 07:41

## 2022-08-11 RX ADMIN — ACETAMINOPHEN 975 MG: 325 TABLET ORAL at 07:40

## 2022-08-11 ASSESSMENT — ACTIVITIES OF DAILY LIVING (ADL)
ADLS_ACUITY_SCORE: 28

## 2022-08-11 NOTE — PROGRESS NOTES
MRSA sensitive to clindamycin clindamycin ordered.  Reviewed clindamycin unlikely to cause birth defects. Continue Zosyn, will defer to ID for final recommendation while awaiting cultures.

## 2022-08-11 NOTE — PROGRESS NOTES
[facility]    Received request for benefit check should Simran require home IV abx. Pt has 100% coverage for IV abx through [employer] plan.     Met with Simran at bedside to review benefits, introduce home infusion, and offer choice of agency. She would like to proceed with [facility] for home IV abx needs. I will speak with pt on day of discharge to coordinate time for IV education for her and her mom.     Thank you for the referral.     Phuong Bynum RN  [facility] Liaison  [phone] (Mon thru Fri 8am - 5pm)  [phone] Office

## 2022-08-11 NOTE — PROCEDURES
[facility]    Single Lumen PICC Placement    Date/Time: 8/11/2022 1:45 PM  Performed by: Bushra Villarreal RN  Authorized by: Kevyn Mondragon MD   Indications: vascular access      UNIVERSAL PROTOCOL   Site Marked: Yes  Prior Images Obtained and Reviewed:  Yes  Required items: Required blood products, implants, devices and special equipment available    Patient identity confirmed:  Verbally with patient, arm band and hospital-assigned identification number  NA - No sedation, light sedation, or local anesthesia  Confirmation Checklist:  Patient's identity using two indicators, relevant allergies, procedure was appropriate and matched the consent or emergent situation and correct equipment/implants were available  Time out: Immediately prior to the procedure a time out was called    Universal Protocol: the Joint Commission Universal Protocol was followed    Preparation: Patient was prepped and draped in usual sterile fashion    ESBL (mL):  2     ANESTHESIA    Anesthesia: Local infiltration  Local Anesthetic:  Lidocaine 1% without epinephrine  Anesthetic Total (mL):  2      SEDATION    Patient Sedated: No        Preparation: skin prepped with ChloraPrep  Skin prep agent: skin prep agent completely dried prior to procedure  Sterile barriers: maximum sterile barriers were used: cap, mask, sterile gown, sterile gloves, and large sterile sheet  Hand hygiene: hand hygiene performed prior to central venous catheter insertion  Type of line used: Power PICC  Catheter type: single lumen  Lumen type: valved  Catheter size: 4 Fr  Brand: Bard  Lot number: [id number]  Placement method: venipuncture, MST, ultrasound and tip navigation system  Number of attempts: 1  Difficulty threading catheter: no  Successful placement: yes  Orientation: right  Catheter to Vein (%): 5  Location: brachial vein (medial)  : at CAJ.  Arm circumference: adults 10 cm  Extremity circumference: 33.5  Visible catheter length:  3  Internal length: 41 cm  Total catheter length: 44  Dressing and securement: additional securement method (see comment), chlorhexidine disc applied, dressing applied, line secured, securement device, site cleansed, sterile dressing applied, subcutaneous anchor securement system, taped and transparent dressing  Post procedure assessment: blood return through all ports and placement verified by 3CG technology (flushes freely)  PROCEDURE   Patient Tolerance:  Patient tolerated the procedure well with no immediate complicationsDescribe Procedure: PICC line placed in patient's right medial brachial vein without difficulty. 3CG tip confirmation utilized for PICC placement and tip is confirmed to be at CAJ. PICC line is ok to use. Tracy, bedside RN updated. See flowsheet for additional information.

## 2022-08-11 NOTE — PROGRESS NOTES
Therapy: IV abx  Insurance: [facility]    100% coverage for IV abx    In reference to admission on 8/7/22 to check IV abx coverage    Please contact Intake with any questions, [phone] or In Basket pool, [facility] ([phone]).   I have personally provided the amount of critical care time documented below excluding time spent on separate procedures.

## 2022-08-11 NOTE — PROGRESS NOTES
Care Management Follow Up    Length of Stay (days): 4    Expected Discharge Date: 08/12/2022     Concerns to be Addressed: all concerns addressed in this encounter     Patient plan of care discussed at interdisciplinary rounds: Yes    Anticipated Discharge Disposition:       Anticipated Discharge Services:  Home infusion  Anticipated Discharge DME:      Referrals Placed by CM/SW: Home Infusion   Private pay costs discussed: Not applicable    Additional Information:  Pt likely needing extended IV abx at discharge. Benefit check sent to [facility] to check for coverage. Will update as able.    Lourdes Corley RN, BSN CTS  Care Coordinator  [facility]   [phone]    Update 1053: Pt has 100% coverage for IV abx through [employer] PMAP plan.

## 2022-08-11 NOTE — PLAN OF CARE
Goal Outcome Evaluation:  Vital signs stable.  Dressing intact to left  foot, cam dotson on, extremity elevated on pillow.Ice pack applied.  NWB to LLE.Uses knee scooter with sba of 1 to transfer.  Voiding,BM today.  Hgb 9.1.  On iv Zosyn. Urine culture positive for e coli. MD web paged.  Pt's left foot wound   culture positive for MRSA per micro  Ashleigh Amaro.MD WAS WEB PAGED.   Pain controlled with tylenol.  Plan of Care Reviewed With: patient

## 2022-08-11 NOTE — PROGRESS NOTES
"[facility]  Infectious Disease Progress Note          Assessment and Plan:   IMPRESSION:     1.  A 42-year-old female with recent left foot Lisfranc fracture with hardware repair, now early postop draining, redness and presumed infection.  No major clinical sepsis, status post operation with cultures pending.  2.  Twenty weeks' pregnancy.     RECOMMENDATIONS:  1 cx is MRSA, is now on clinda not ideal but logical choice here, this is likely to be a 2 step cure process, initial goal bone fuse then hardware out and cure with 6 + week course  2 Given crat vanco not ideal, dapto unclear pregnacy risk, so clinda 4 weeks then po follow foot and labs, ortho follow closely for ? fx heal success  Orders in  Discussed MRSA, foot situation, ABX in detail        Interval History:   no new complaints and doing well; no cp, sob, n/v/d, or abd beata pain OK cx staph aureus is MRSA              Medications:       clindamycin  900 mg Intravenous Q8H     enoxaparin ANTICOAGULANT  40 mg Subcutaneous Q24H     miconazole  1 applicator Vaginal At Bedtime     polyethylene glycol  17 g Oral Daily     senna-docusate  1 tablet Oral BID     sodium chloride (PF)  3 mL Intracatheter Q8H                  Physical Exam:   Blood pressure 117/67, pulse 78, temperature 97.3  F (36.3  C), temperature source Temporal, resp. rate 16, height 1.626 m (5' 4\"), weight 89.6 kg (197 lb 9.6 oz), last menstrual period 04/14/2022, SpO2 97 %, unknown if currently breastfeeding.  Wt Readings from Last 2 Encounters:   08/08/22 89.6 kg (197 lb 9.6 oz)   07/13/22 81.6 kg (180 lb)     Vital Signs with Ranges  Temp:  [97.3  F (36.3  C)-97.8  F (36.6  C)] 97.3  F (36.3  C)  Pulse:  [78-89] 78  Resp:  [16-18] 16  BP: (114-135)/(60-77) 117/67  SpO2:  [97 %-99 %] 97 %    Constitutional: Awake, alert, cooperative, no apparent distress   Lungs: Clear to auscultation bilaterally, no crackles or wheezing   Cardiovascular: Regular rate and rhythm, normal " S1 and S2, and no murmur noted   Abdomen: Normal bowel sounds, soft, non-distended, non-tender   Skin: No rashes, no cyanosis, no edema foot covered   Other:           Data:   All microbiology laboratory data reviewed.  Recent Labs   Lab Test 08/10/22  0731 08/09/22  0632 08/08/22  0631 08/07/22  2125   WBC 10.4  --  7.9 10.3   HGB 9.1*  9.1* 8.8* 8.8* 9.1*   HCT 30.0*  --  29.0* 29.7*   MCV 89  --  88 87     --  315 342     Recent Labs   Lab Test 08/11/22  0923 08/10/22  0731 08/09/22  0632   CR 1.26* 1.14*  1.20* 0.79     Recent Labs   Lab Test 08/09/22  0131   SED 48*     Recent Labs   Lab Test 10/29/17  1141   CULT No Beta Streptococcus isolated

## 2022-08-11 NOTE — PROGRESS NOTES
Orthopedic Surgery  Simran Fish  DATE: 2022    Admit Date:  2022    A/P:  Simran Fish is a 42 year old , 19w1d into current pregnancy, who is nearly 4 weeks s/p ORIF left foot Lisfranc injury admitted 2022 with increased drainage from incision site concerning for dehiscence, infection.    #Left foot wound dehiscence s/p I&D and delayed secondary closure, #3 Days Post-Op  -  NWB LLE in CAM.   -  In boot as much as possible to prevent flexion contracture  -  Dressing changes daily and PRN  -  Elevate, ice  -  PT/SW  -  Abx as per ID (currently Vanc/Zosyn; MAGDA noted - Dr Mondragon to assess)   -  Intraop cultures growing 3+ staph aureus and 2+ anaerococcus.   -  Anticipate home with PICC and IV Abx for suppressive Abx until fracture fully healed and hardware removed  -  Lovenox for DVT PPx x 14 days post-op     #Disposition  -  Await Abx choice/plan.    -  Follow-up with Dr Chacon in 10-14 days.    _________________________________________________________    INTERVAL HISTORY:  No overnight events.  Changed dressing at bedside.  Remains TTP along distal incisional site.  Has been agreeable to mobilizing in room with assistance today and will be working with therapies later on for stairs and scooter training.   Also reports swelling better and she is able to dorsiflex ankle to 90 degrees.        PHYSICAL EXAM:  Vital Signs: Temp: 97.3  F (36.3  C) Temp src: Temporal BP: 117/67 Pulse: 78   Resp: 16 SpO2: 97 % O2 Device: None (Room air)    I/O last 3 completed shifts:  In: 980 [P.O.:980]  Out: -   Vitals:    22 0136   Weight: 89.6 kg (197 lb 9.6 oz)       Pleasant, cooperative  Respirations full, unlabored  Moving ext x4, CMS intact.  Mild left foot/ankle edema.  ACE wrap c/d/i  No drainage from incision.   AOx3.  CN2-12 grossly intact.  NVI    LABORATORY DATA:  I reviewed all medications, new labs and imaging results over the last 24 hours.  Recent Labs   Lab Test 22  0923  08/10/22  0731 08/09/22  0632 08/08/22  0631    137  --  136   POTASSIUM 4.2 4.1  --  4.0   BUN 9.9 7.7  --  5.5*   CR 1.26* 1.14*  1.20* 0.79 0.58     Recent Labs   Lab Test 08/10/22  0731 08/09/22  0632 08/08/22  0631 08/07/22  2125 06/27/22  0654 10/29/17  1157   WBC 10.4  --  7.9 10.3 9.4 6.9   HGB 9.1*  9.1* 8.8* 8.8* 9.1* 9.9* 12.1     --  315 342 360 442     No lab results found.    Results for orders placed or performed during the hospital encounter of 08/07/22   CT Foot Left w Contrast    Narrative    FINAL REPORT    I agree with the preliminary report. Dorsal plate and screw fixation with bone graft at the second and third TMT joints including fixation of the proximal 2nd and 3rd metatarsal fractures. Screw fixation of the medial to middle cuneiform. Ununited   fracture of the base of the 4th metatarsal. No hardware fracture or lucency adjacent to the hardware fixation. No acute fractures are evident. Diffuse soft tissue swelling of the dorsum of the foot with scattered areas of bone graft/osseous debris. No   subcutaneous abscess or subcutaneous emphysema. Additional mild diffuse subcutaneous edema in the ankle. No joint effusions. No retracted tendon tear. Normal alignment of the peroneal tendons.    Final Interpretation Dictated By: Max Abrams MD  Date: 8/8/2022       PRELIMINARY EXAM: CT FOOT LEFT W CONTRAST  LOCATION: [facility]  DATE/TIME: 8/7/2022 10:42 PM    INDICATION: Surgery, now signs of infection.  COMPARISON: None.  TECHNIQUE: IV contrast. Axial, sagittal and coronal thin-section reconstruction. Dose reduction techniques were used.   CONTRAST: 100 mL Isovue 370.    PRELIMINARY FINDINGS:   Postsurgical change with plate and screw fixation across the second and third tarsometatarsal joints. Additional screw extending from the medial cuneiform to the base of the second metatarsal. Uncertain if ill-defined sclerosis at base of second and   third  metatarsals is related to healing of prior fractures. There is mineralization within adjacent soft tissues and soft tissue edema along the dorsal lateral midfoot. Infection not excluded.    Please see final interpretation by MSK in the a.m.    Preliminary Interpretation Dictated By: Soni Elias MD  Date: 8/7/2022

## 2022-08-11 NOTE — PROVIDER NOTIFICATION
Received call from lab techAshleigh, [facility].  Pt's culture from left foot wound positive for MRSA.  Web paged MD and pt placed on contact isolation.

## 2022-08-11 NOTE — PLAN OF CARE
Goal Outcome Evaluation:        Pt is alert and oriented, up independently in the room with the cam boot and knee scooter. Pt taking just tylenol for pain. Pt now on IV Clindamycin and Zosyn for antibiotics, cultures growing MRSA. Probable need for PICC and long term antibiotics to be determined by ID today.

## 2022-08-11 NOTE — PROGRESS NOTES
[facility]  Hospitalist Progress Note  Elia Sierra MD.   08/11/2022    Reason for Stay/active problem list    Postoperative left foot wound dehiscence s/p left foot debridement by orthopedic surgery's admission    Pregnancy       Assessment and Plan:        Summary of Stay: Simran Fish is a 42 year old -American female with a significant past medical history of left foot fracture and dislocation which required operative reduction internal fixation by Dr. Chacon on July 13, 2022 who presents with left foot swelling and drainage from incisional wound.     On presentation to emergency department blood pressure 129/85, heart rate 89, temperature 99.8.  Respiratory rate 20 and oxygen saturation 98%.     Blood work revealed sodium of 134, calcium 8.4, otherwise unremarkable comprehensive metabolic panel.  WBC count 10.3, hemoglobin 9.1, platelet 342.     CT of the left foot showed mineralization within the adjacent soft tissue and soft tissue edema along the dorsal lateral midfoot.  Further musculoskeletal radiology review pending.     Orthopedic surgery was consulted and Dr. Trejo was contacted who recommended admission and incision and drainage next day.    Patient was taken to the OR on August 8 and had left foot wound debridement.    Problem List with Assessment and Plan:    1.  Status post left foot wound debridement with delayed secondary closure.  -Patient developed pain and swelling.  Erythema or drainage of the left foot incisional wound following ORIF of left foot fracture on  July 13.   -There was concern for wound infection/deep infection and underwent left foot wound debridement on 8/8/20/2022.  -Orthopedic surgery input appreciated.  -Vancomycin stopped, Clindamycin started, and will continue IV as an outpatient  -Wound culture grew MRSA, sensitive to Clinda..  -The anaerobic culture also grew anaerococcus species  -She is afebrile and hemodynamically stable.    -ID and  "orthopedic surgery  following for further care plan.    -Patient wants fungal infection prophylaxis oral antibiotic unfortunately she is pregnant and due to risk of systemic antifungal we are unable to give her Diflucan.  We will treat with topical fungal infections as needed.  Postoperative Hgb is stable, no significant blood loss reported.  -DVT prophylaxis per orthopedic surgery team  -PT and OT for discharge needs.    2. Second trimester pregnancy.  -Follows with [facility] Hairston in [place].  -Advised follow up with OB as an outpatient  -Plan to avoid NSAIDs and continue daily fetal heart Dopplers     3.  History of vasovagal syncope with close displaced fracture of metatarsal bone of the left foot June 27, 2022.  - Seen in the emergency department on June 27 and July 3 acute after a fall.  Patient was subsequently seen by orthopedic surgery and surgical intervention noted on July 13 as above.  - Currently hemodynamically stable.    4.  Asthma: Stable    5.  MAGDA: Baseline creatinine was 0.6, increased to 1.26 today.  -This is likely due to medication/Vancomycin, now stopped.  -Encourage oral intake of fluids.  -BMP in the morning.    6. UTI: UA was mildly positive, urine culture grew out E. Coli.  -Patient is asymptomatic.  -She is already on IV antibiotic Zosyn which will be continued    Clinically Significant Risk Factors Present on Admission               # Obesity: Estimated body mass index is 33.92 kg/m  as calculated from the following:    Height as of this encounter: 1.626 m (5' 4\").    Weight as of this encounter: 89.6 kg (197 lb 9.6 oz).        VTE Prophylaxis: Pneumatic Compression Devices  Code Status: Full Code  Diet: Advance Diet as Tolerated: Regular Diet Adult    Eric Catheter: Not present    Family updated today: No     Disposition: She will be discharged tomorrow on IV antibiotics with home infusion referral, if Cr improves and she remains stable.        Interval History (Subjective):    " "    Patient is seen and examined, chart, medication, labs reviewed.  Pain is fairly controlled, she denies any new complaint.                  Physical Exam:        Last Vital Signs:  /67 (BP Location: Left arm)   Pulse 78   Temp 97.3  F (36.3  C) (Temporal)   Resp 16   Ht 1.626 m (5' 4\")   Wt 89.6 kg (197 lb 9.6 oz)   LMP 04/14/2022 (Approximate)   SpO2 97%   BMI 33.92 kg/m      I/O last 3 completed shifts:  In: 980 [P.O.:980]  Out: -     Wt Readings from Last 5 Encounters:   08/08/22 89.6 kg (197 lb 9.6 oz)   07/13/22 81.6 kg (180 lb)   04/22/21 81.6 kg (180 lb)   04/26/19 90.7 kg (200 lb)   05/13/16 83.9 kg (185 lb)      Constitutional: Awake, alert, cooperative, no apparent distress     Respiratory: Clear to auscultation bilaterally, no crackles or wheezing   Cardiovascular: Regular rate and rhythm, normal S1 and S2, and no murmur noted   Abdomen: Normal bowel sounds, soft, non-distended, non-tender   Skin: No new rashes, no cyanosis, dry to touch   Neuro: Alert with  no new focal weakness   Extremities: Left foot dressed and immobilized, drainage in place.   Other(s):        All other systems: Negative          Medications:        All current medications were reviewed with changes reflected in problem list.  Current Facility-Administered Medications   Medication     acetaminophen (TYLENOL) tablet 650 mg     albuterol (PROVENTIL HFA/VENTOLIN HFA) inhaler     benzocaine-menthol (CHLORASEPTIC) 6-10 MG lozenge 1 lozenge     bisacodyl (DULCOLAX) suppository 10 mg     clindamycin (CLEOCIN) infusion 900 mg     enoxaparin ANTICOAGULANT (LOVENOX) injection 40 mg     HYDROmorphone (DILAUDID) injection 0.2 mg    Or     HYDROmorphone (DILAUDID) injection 0.4 mg     lidocaine (LMX4) kit     lidocaine 1 % 0.1-1 mL     lidocaine 1 % 0.1-5 mL     magnesium hydroxide (MILK OF MAGNESIA) suspension 30 mL     melatonin tablet 1 mg     miconazole (MICATIN) 2 % cream 1 applicator     naloxone (NARCAN) injection 0.2 mg "    Or     naloxone (NARCAN) injection 0.4 mg    Or     naloxone (NARCAN) injection 0.2 mg    Or     naloxone (NARCAN) injection 0.4 mg     ondansetron (ZOFRAN ODT) ODT tab 4 mg    Or     ondansetron (ZOFRAN) injection 4 mg     oxyCODONE (ROXICODONE) tablet 5 mg    Or     oxyCODONE (ROXICODONE) tablet 10 mg     polyethylene glycol (MIRALAX) Packet 17 g     prochlorperazine (COMPAZINE) injection 10 mg    Or     prochlorperazine (COMPAZINE) tablet 10 mg    Or     prochlorperazine (COMPAZINE) suppository 25 mg     senna-docusate (SENOKOT-S/PERICOLACE) 8.6-50 MG per tablet 1 tablet     sodium chloride (PF) 0.9% PF flush 10-20 mL     sodium chloride (PF) 0.9% PF flush 10-40 mL     sodium chloride (PF) 0.9% PF flush 10-40 mL     sodium chloride (PF) 0.9% PF flush 10-40 mL     sodium chloride (PF) 0.9% PF flush 3 mL     sodium chloride (PF) 0.9% PF flush 3 mL          Data:      All new lab and imaging data was reviewed.      Data reviewed today: I reviewed all new labs and imaging results over the last 24 hours. I personally reviewed   Recent Labs   Lab 08/10/22  0731 08/09/22  0632 08/08/22  0631 08/07/22  2125   WBC 10.4  --  7.9 10.3   HGB 9.1*  9.1* 8.8* 8.8* 9.1*   HCT 30.0*  --  29.0* 29.7*   MCV 89  --  88 87     --  315 342     No results for input(s): CULT in the last 168 hours.  Recent Labs   Lab 08/11/22  0923 08/10/22  0731 08/10/22  0538 08/09/22  0632 08/08/22 0631 08/07/22  2125    137  --   --  136 134*   POTASSIUM 4.2 4.1  --   --  4.0 3.7   CHLORIDE 105 105  --   --  108* 103   CO2 24 21*  --   --  21* 23   ANIONGAP 7 11  --   --  7 8   GLC 82 80 92 86 88 84   BUN 9.9 7.7  --   --  5.5* 6.7   CR 1.26* 1.14*  1.20*  --  0.79 0.58 0.60   GFRESTIMATED 54* 61  58*  --  >90 >90 >90   СВЕТЛАНА 8.8 8.9  --   --  7.9* 8.4*   PROTTOTAL  --   --   --   --   --  6.8   ALBUMIN  --   --   --   --   --  3.4*   BILITOTAL  --   --   --   --   --  0.2   ALKPHOS  --   --   --   --   --  68   AST  --   --   --    --   --  13   ALT  --   --   --   --   --  9*       Recent Labs   Lab 08/11/22  0923 08/10/22  0731 08/10/22  0538 08/09/22  0632 08/08/22  0631   GLC 82 80 92 86 88       No results for input(s): INR in the last 168 hours.      No results for input(s): TROPONIN, TROPI, TROPR in the last 168 hours.    Invalid input(s): TROP, TROPONINIES    Recent Results (from the past 48 hour(s))   CT Foot Left w Contrast    Narrative    FINAL REPORT    I agree with the preliminary report. Dorsal plate and screw fixation with bone graft at the second and third TMT joints including fixation of the proximal 2nd and 3rd metatarsal fractures. Screw fixation of the medial to middle cuneiform. Ununited   fracture of the base of the 4th metatarsal. No hardware fracture or lucency adjacent to the hardware fixation. No acute fractures are evident. Diffuse soft tissue swelling of the dorsum of the foot with scattered areas of bone graft/osseous debris. No   subcutaneous abscess or subcutaneous emphysema. Additional mild diffuse subcutaneous edema in the ankle. No joint effusions. No retracted tendon tear. Normal alignment of the peroneal tendons.    Final Interpretation Dictated By: Max Abrams MD  Date: 8/8/2022       PRELIMINARY EXAM: CT FOOT LEFT W CONTRAST  LOCATION: [facility]  DATE/TIME: 8/7/2022 10:42 PM    INDICATION: Surgery, now signs of infection.  COMPARISON: None.  TECHNIQUE: IV contrast. Axial, sagittal and coronal thin-section reconstruction. Dose reduction techniques were used.   CONTRAST: 100 mL Isovue 370.    PRELIMINARY FINDINGS:   Postsurgical change with plate and screw fixation across the second and third tarsometatarsal joints. Additional screw extending from the medial cuneiform to the base of the second metatarsal. Uncertain if ill-defined sclerosis at base of second and   third metatarsals is related to healing of prior fractures. There is mineralization within adjacent soft tissues and  soft tissue edema along the dorsal lateral midfoot. Infection not excluded.    Please see final interpretation by MSK in the a.m.    Preliminary Interpretation Dictated By: Soni Elias MD  Date: 8/7/2022        COVID Status:  COVID-19 PCR Results    COVID-19 PCR Results 11/16/20 4/22/21 4/22/21 11/1/21 1/16/22 7/13/22 8/7/22 2047 [id number]       COVID-19 Virus by PCR (External Result) Detected (A)  Negative Negative      Flu A/B & SARS-COV-2 PCR Source  Nasopharyngeal        SARS-CoV-2 PCR Result  NEGATIVE        SARS CoV2 PCR     Positive (A) Negative Negative   (A) Abnormal value       Comments are available for some flowsheets but are not being displayed.         COVID-19 Antibody Results, Testing for Immunity    COVID-19 Antibody Results, Testing for Immunity   No data to display.

## 2022-08-12 ENCOUNTER — APPOINTMENT (OUTPATIENT)
Dept: ULTRASOUND IMAGING | Facility: CLINIC | Age: 42
DRG: 818 | End: 2022-08-12
Attending: INTERNAL MEDICINE
Payer: COMMERCIAL

## 2022-08-12 ENCOUNTER — APPOINTMENT (OUTPATIENT)
Dept: PHYSICAL THERAPY | Facility: CLINIC | Age: 42
DRG: 818 | End: 2022-08-12
Payer: COMMERCIAL

## 2022-08-12 ENCOUNTER — HOME INFUSION (PRE-WILLOW HOME INFUSION) (OUTPATIENT)
Dept: PHARMACY | Facility: CLINIC | Age: 42
End: 2022-08-12

## 2022-08-12 VITALS
RESPIRATION RATE: 20 BRPM | HEART RATE: 83 BPM | SYSTOLIC BLOOD PRESSURE: 129 MMHG | OXYGEN SATURATION: 100 % | HEIGHT: 64 IN | BODY MASS INDEX: 33.73 KG/M2 | WEIGHT: 197.6 LBS | DIASTOLIC BLOOD PRESSURE: 67 MMHG | TEMPERATURE: 97.8 F

## 2022-08-12 LAB
ANION GAP SERPL CALCULATED.3IONS-SCNC: 7 MMOL/L (ref 7–15)
BACTERIA BLD CULT: NO GROWTH
BACTERIA BLD CULT: NO GROWTH
BUN SERPL-MCNC: 8.7 MG/DL (ref 6–20)
CALCIUM SERPL-MCNC: 8.6 MG/DL (ref 8.6–10)
CHLORIDE SERPL-SCNC: 107 MMOL/L (ref 98–107)
CREAT SERPL-MCNC: 1.1 MG/DL (ref 0.51–0.95)
DEPRECATED HCO3 PLAS-SCNC: 23 MMOL/L (ref 22–29)
GFR SERPL CREATININE-BSD FRML MDRD: 64 ML/MIN/1.73M2
GLUCOSE SERPL-MCNC: 79 MG/DL (ref 70–99)
POTASSIUM SERPL-SCNC: 4.1 MMOL/L (ref 3.4–5.3)
SODIUM SERPL-SCNC: 137 MMOL/L (ref 136–145)

## 2022-08-12 PROCEDURE — 250N000011 HC RX IP 250 OP 636: Performed by: HOSPITALIST

## 2022-08-12 PROCEDURE — 99239 HOSP IP/OBS DSCHRG MGMT >30: CPT | Performed by: INTERNAL MEDICINE

## 2022-08-12 PROCEDURE — 250N000013 HC RX MED GY IP 250 OP 250 PS 637

## 2022-08-12 PROCEDURE — 93971 EXTREMITY STUDY: CPT | Mod: RT

## 2022-08-12 PROCEDURE — 80048 BASIC METABOLIC PNL TOTAL CA: CPT | Performed by: INTERNAL MEDICINE

## 2022-08-12 PROCEDURE — 99232 SBSQ HOSP IP/OBS MODERATE 35: CPT | Performed by: INTERNAL MEDICINE

## 2022-08-12 PROCEDURE — 250N000011 HC RX IP 250 OP 636: Performed by: PHYSICIAN ASSISTANT

## 2022-08-12 PROCEDURE — 97530 THERAPEUTIC ACTIVITIES: CPT | Mod: GP

## 2022-08-12 RX ORDER — MICONAZOLE NITRATE 2 %
1 CREAM WITH APPLICATOR VAGINAL AT BEDTIME
Qty: 0.6 G | Refills: 0 | Status: SHIPPED | OUTPATIENT
Start: 2022-08-12 | End: 2022-08-18

## 2022-08-12 RX ADMIN — CLINDAMYCIN PHOSPHATE 900 MG: 900 INJECTION, SOLUTION INTRAVENOUS at 16:28

## 2022-08-12 RX ADMIN — CLINDAMYCIN PHOSPHATE 900 MG: 900 INJECTION, SOLUTION INTRAVENOUS at 08:05

## 2022-08-12 RX ADMIN — OXYCODONE HYDROCHLORIDE 5 MG: 5 TABLET ORAL at 00:24

## 2022-08-12 RX ADMIN — CLINDAMYCIN PHOSPHATE 900 MG: 900 INJECTION, SOLUTION INTRAVENOUS at 00:14

## 2022-08-12 RX ADMIN — ENOXAPARIN SODIUM 40 MG: 40 INJECTION SUBCUTANEOUS at 16:28

## 2022-08-12 ASSESSMENT — ACTIVITIES OF DAILY LIVING (ADL)
ADLS_ACUITY_SCORE: 29
ADLS_ACUITY_SCORE: 27
ADLS_ACUITY_SCORE: 29
ADLS_ACUITY_SCORE: 27
ADLS_ACUITY_SCORE: 29
ADLS_ACUITY_SCORE: 28
ADLS_ACUITY_SCORE: 27

## 2022-08-12 NOTE — PLAN OF CARE
Goal Outcome Evaluation:    Flat affect. Voiding. New Picc Rue-Aqua k pad ordered-some edema noted. +gas, lbm 08/11/22. Drsg-cdi. Cms-+1 edema left toes/top foot & +2 Rue. Pain managed with Oxycodone & ice. Up in chair for most of shift with Lle elevated & Cam boot in place. Plan to discharge to home with home infusion antibiotics tomorrow. No other significant issues noted this evening.

## 2022-08-12 NOTE — PROGRESS NOTES
VSS on RA. AxOx4. Up ad brice in room w/ CAM boot and scooter. PICC SL between abx. Pt endorses discomfort at new PICC site, prn oxycodone and aqua K pad utilized w/ relief. CMS intact. Plan is to discharge home with IV abx through home infusion today. Will continue to provide supportive cares.     Assumed Care: [id number]

## 2022-08-12 NOTE — PROGRESS NOTES
"Orthopedic Surgery  Simran Fish  DATE: 2022    Admit Date:  2022    A/P:  Simran Fish is a 42 year old , 19w3d into current pregnancy, who is nearly 4 weeks s/p ORIF left foot Lisfranc injury admitted 2022 with increased drainage from incision site concerning for dehiscence, infection.    #Left foot wound dehiscence s/p I&D and delayed secondary closure, #4 Days Post-Op  -  NWB LLE in CAM.   -  In boot as much as possible to prevent flexion contracture  -  Dressing changed.  Continue daily changes with dry gauze and ACE.   -  Elevate, ice  -  PT/SW  -  Abx as per ID    -  Intraop cultures growing 3+ MRSA and 2+ anaerococcus.   -  Anticipate home with PICC and IV Clindamycin for suppression until fracture fully healed and hardware removed  -  Lovenox for DVT PPx x 14 days post-op.  RN to do Lovenox teaching.    #Disposition  -  OK for discharge from Ortho standpoint  -  Follow-up with Dr Chacon in 10-14 days.    _________________________________________________________    INTERVAL HISTORY:  No overnight events.  Seems hesitant to go home.  Is also hesitant to go home on Lovenox because had some pain described \"like contractions\" after Lovenox the last few days.  Unable to use ASA due to allergy or DOAC due to pregnancy.  Ultimately agreeable.        PHYSICAL EXAM:  Vital Signs: Temp: 97.4  F (36.3  C) Temp src: Temporal BP: 118/62 Pulse: 77   Resp: 16 SpO2: 96 % O2 Device: None (Room air)    I/O last 3 completed shifts:  In: 850 [P.O.:850]  Out: -   Vitals:    22 0136   Weight: 89.6 kg (197 lb 9.6 oz)       GENERAL:  Pleasant, cooperative, alert. WDWN.  Appears apathetic, depressed  HEENT: Normocephalic, atraumatic.  Extra occular mm intact.  Sclera clear. PERRL.  Mucous membranes moist.     PULMONOLOGY: Unlabored  MUSCULOSKELETAL:  Moving x 4 spontaneously with CMS intact x4.  Left ankle ACE and soft dressing unwrapped.  Dorsal incision c/d/i.  Moderate pedal edema placing some " tension on the incision but remains well reapproximated. Minimal drainage.  No surrounding erythema or focal induration.  Full ROM distal toes.  Sensation intact distal toe tips.  WWP  NEURO: Alert and oriented x3.  CN II-XII grossly intact and symmetric.         LABORATORY DATA:  I reviewed all medications, new labs and imaging results over the last 24 hours.  Recent Labs   Lab Test 08/12/22  0555 08/11/22  0923 08/10/22  0731    136 137   POTASSIUM 4.1 4.2 4.1   BUN 8.7 9.9 7.7   CR 1.10* 1.26* 1.14*  1.20*     Recent Labs   Lab Test 08/10/22  0731 08/09/22  0632 08/08/22  0631 08/07/22  2125 06/27/22  0654 10/29/17  1157   WBC 10.4  --  7.9 10.3 9.4 6.9   HGB 9.1*  9.1* 8.8* 8.8* 9.1* 9.9* 12.1     --  315 342 360 442     No lab results found.    Results for orders placed or performed during the hospital encounter of 08/07/22   CT Foot Left w Contrast    Narrative    FINAL REPORT    I agree with the preliminary report. Dorsal plate and screw fixation with bone graft at the second and third TMT joints including fixation of the proximal 2nd and 3rd metatarsal fractures. Screw fixation of the medial to middle cuneiform. Ununited   fracture of the base of the 4th metatarsal. No hardware fracture or lucency adjacent to the hardware fixation. No acute fractures are evident. Diffuse soft tissue swelling of the dorsum of the foot with scattered areas of bone graft/osseous debris. No   subcutaneous abscess or subcutaneous emphysema. Additional mild diffuse subcutaneous edema in the ankle. No joint effusions. No retracted tendon tear. Normal alignment of the peroneal tendons.    Final Interpretation Dictated By: Max Abrams MD  Date: 8/8/2022       PRELIMINARY EXAM: CT FOOT LEFT W CONTRAST  LOCATION: [facility]  DATE/TIME: 8/7/2022 10:42 PM    INDICATION: Surgery, now signs of infection.  COMPARISON: None.  TECHNIQUE: IV contrast. Axial, sagittal and coronal thin-section  reconstruction. Dose reduction techniques were used.   CONTRAST: 100 mL Isovue 370.    PRELIMINARY FINDINGS:   Postsurgical change with plate and screw fixation across the second and third tarsometatarsal joints. Additional screw extending from the medial cuneiform to the base of the second metatarsal. Uncertain if ill-defined sclerosis at base of second and   third metatarsals is related to healing of prior fractures. There is mineralization within adjacent soft tissues and soft tissue edema along the dorsal lateral midfoot. Infection not excluded.    Please see final interpretation by [facility] in the a.m.    Preliminary Interpretation Dictated By: Soni Elias MD  Date: 8/7/2022

## 2022-08-12 NOTE — PLAN OF CARE
A/o x4. VSS. Discharge instructions gone over and understanding verbalized. IV removed. Discharged with PICC- home infusion meeting patient later today. Meds given to patient to go home with. CAM boot and coleman sent with patient. Tracy ordered for patient to be transported home.

## 2022-08-12 NOTE — PLAN OF CARE
"Patient vital signs are at baseline: Yes  Patient able to ambulate as they were prior to admission or with assist devices provided by therapies during their stay:  Yes, use scooter  Patient MUST void prior to discharge:  Yes  Patient able to tolerate oral intake:  Yes  Pain has adequate pain control using Oral analgesics:  Yes  Does patient have an identified :  Yes  Has goal D/C date and time been discussed with patient:  Yes, tomorrow.     Pt. Has PICC line is CDI, denied pain, dressing on left foot was changed today, pt. Wears boot, voiding adequately. CMS intact.    /64 (BP Location: Left arm)   Pulse 86   Temp 97.6  F (36.4  C) (Temporal)   Resp 28   Ht 1.626 m (5' 4\")   Wt 89.6 kg (197 lb 9.6 oz)   LMP 04/14/2022 (Approximate)   SpO2 98%   BMI 33.92 kg/m     "

## 2022-08-12 NOTE — PROGRESS NOTES
[facility]    Received a phone call from pt's nurse, Fide at 1620h stating Simran will discharge to home tonight. [facility] will be able to admit pt to services and provide RN visit in the home tonight for IV education. My office will call Simran to let her know what time the medication and supplies will be delivered to her home and what time the RN visit will be. I spoke with Simran to relay this information and she verbalized understanding.     Thank you for the referral.    Phuong Bynum RN  [facility] Liaison  [phone] (Mon thru Fri 8am - 5pm)  [phone] Office

## 2022-08-12 NOTE — PLAN OF CARE
Goal Outcome Evaluation:    Plan of Care Reviewed With: patient        Paged Dr. Sierra: at 1125 pt feels sob since this morning at 0800. Pt did not say anything till now. Vitals done and are stable. Rates her sob at rest a 3 and when up and moving an 8. Has hx of anxiety but pt states that this isn't that. Does not take anxiety meds.   Vitals 121/65 pulse 92, 97-98% room air. RR 20-22.  Dr. Sierra came back to assess pt.     VS-see above. Afebrile,   Lung Sounds-clear, no cough, on room air, encouraged the IS upto 750.   O2-on room air.   GI-+Bs, +flatus.had 3 bm yesterday. Held miralax and senna. On reg diet. Denies nausea.   -voiding well.   IVF-picc line in R arm, sl in between abx. Has some tenderness in arm. Notified vascular--they recommended ultrasound of RUE. They will see pt as well. Notified Dr. Sierra-- ultrasound ordered.   Dressings-elevated on one pillow in bed. Ace wrapped and splinted on L foot.   CMS-+poplieteal pulse. +wiggle toes, denies numbness and tingling, ace wrap c/d/I.   Drain-none.   Activity-cam boot on when out of bed. Has rollabout scooter, NWB on L foot. Pt should wear cam boot as much as possible.   Pain-rates pain a 2.   D/C Plan-home with iv abx. Has picc line in place. Will need [facility] teaching, did FHT on this shift. Pt is 20 weeks pregnant  Down to US at 1500  Holding discharge till ultrasound results come back.

## 2022-08-12 NOTE — PROGRESS NOTES
"[facility]  Infectious Disease Progress Note          Assessment and Plan:   IMPRESSION:     1.  A 42-year-old female with recent left foot Lisfranc fracture with hardware repair, now early postop draining, redness and presumed infection.  No major clinical sepsis, status post operation with cultures pending.  2.  Twenty weeks' pregnancy.     RECOMMENDATIONS:  1 cx is MRSA, is now on clinda not ideal but logical choice here, this is likely to be a 2 step cure process, initial goal bone fuse then hardware out and cure with 6 + week course  2 Given crat vanco not ideal, dapto unclear pregnacy risk, so clinda 4 weeks then po follow foot and labs, ortho follow closely for ? fx heal success  Orders in  Discussed MRSA, foot situation, ABX in detail        Interval History:   Sl SOB, lungs OK doing well; no cp, , n/v/d, or abd beata pain OK cx staph aureus is MRSA and now 1 anaerobe if sig is covered              Medications:       clindamycin  900 mg Intravenous Q8H     enoxaparin ANTICOAGULANT  40 mg Subcutaneous Q24H     miconazole  1 applicator Vaginal At Bedtime     polyethylene glycol  17 g Oral Daily     senna-docusate  1 tablet Oral BID     sodium chloride (PF)  10-40 mL Intracatheter Q8H     sodium chloride (PF)  3 mL Intracatheter Q8H                  Physical Exam:   Blood pressure 127/62, pulse 82, temperature 97.8  F (36.6  C), temperature source Temporal, resp. rate 20, height 1.626 m (5' 4\"), weight 89.6 kg (197 lb 9.6 oz), last menstrual period 04/14/2022, SpO2 97 %, unknown if currently breastfeeding.  Wt Readings from Last 2 Encounters:   08/08/22 89.6 kg (197 lb 9.6 oz)   07/13/22 81.6 kg (180 lb)     Vital Signs with Ranges  Temp:  [97.3  F (36.3  C)-97.8  F (36.6  C)] 97.8  F (36.6  C)  Pulse:  [77-92] 82  Resp:  [14-28] 20  BP: (107-127)/(53-65) 127/62  SpO2:  [95 %-98 %] 97 %    Constitutional: Awake, alert, cooperative, no apparent distress   Lungs: Clear to auscultation " bilaterally, no crackles or wheezing   Cardiovascular: Regular rate and rhythm, normal S1 and S2, and no murmur noted   Abdomen: Normal bowel sounds, soft, non-distended, non-tender   Skin: No rashes, no cyanosis, no edema foot covered   Other:           Data:   All microbiology laboratory data reviewed.  Recent Labs   Lab Test 08/10/22  0731 08/09/22  0632 08/08/22  0631 08/07/22  2125   WBC 10.4  --  7.9 10.3   HGB 9.1*  9.1* 8.8* 8.8* 9.1*   HCT 30.0*  --  29.0* 29.7*   MCV 89  --  88 87     --  315 342     Recent Labs   Lab Test 08/12/22  0555 08/11/22  0923 08/10/22  0731   CR 1.10* 1.26* 1.14*  1.20*     Recent Labs   Lab Test 08/09/22  0131   SED 48*     Recent Labs   Lab Test 10/29/17  1141   CULT No Beta Streptococcus isolated

## 2022-08-12 NOTE — PROGRESS NOTES
[facility]    Received update from RN Care Coordinator, Kaelyn stating Simran will discharge to home today. I spoke with Simran and she is requesting bedside education today at 1400h. Pt will need to dose her 1600h cleocin prior to discharge to home.     Simran was active with [facility] prior to this admission. They stated they would be willing to provide in-home PT but not OT. They previously evaluated her for OT services but she didn't require them. It doesn't appear that Simran was evaluated by OT during this hospitalization. [facility] will provide in-home RN. Pt's Care Coordinator updated via email.     Addendum @ 2732u: I was updated by pt's nurse that her discharge has been cancelled due to new onset SOB and pain to RUE. Per floor nurse, pt will get an ultrasound and her discharge will be determined based off of US results. [facility] will need to know before 5pm if the pt will discharge today; if after 5pm, we will need her discharge postponed until 8/13/22. Pt was updated about [facility]'s need to know about discharge before 5pm and she was understanding. She declined our planned 2pm education due to not feeling well. If pt discharges today, I will coordinate education prior to discharge. If she discharges over the weekend, [facility] will coordinate IV education with pt.     Thank you for the referral.    [facility] Liaison  [phone] (Mon thru Fri 8am - 5pm)  [phone] Office

## 2022-08-13 DIAGNOSIS — Z71.89 OTHER SPECIFIED COUNSELING: ICD-10-CM

## 2022-08-13 NOTE — PROGRESS NOTES
Physical Therapy Discharge Summary    Reason for therapy discharge:    Discharged to home with home therapy.    Progress towards therapy goal(s). See goals on Care Plan in [facility] electronic health record for goal details.  Goals not met.  Barriers to achieving goals:   discharge from facility.    Therapy recommendation(s):    Continued therapy is recommended.  Rationale/Recommendations:  strengthening and mobility.

## 2022-08-13 NOTE — DISCHARGE SUMMARY
[facility]  Discharge Summary  Name: Simran Fish    MRN: [id number]  YOB: 1980    Age: 42 year old  Date of Discharge:  8/12/2022  6:22 PM  Date of Admission: 8/7/2022  Primary Care Provider: Madelyn Howard  Discharge Physician:  Elia Sierra M.D  Discharging Service:  Hospitalist      Discharge Diagnosis:  Status post left foot recent ORIF incision wound debridement with delayed secondary closure  MRSA wound infection in a patient with hardware  Acute kidney injury secondary to medication  UTI     Other Diagnosis:  Second trimester pregnancy  Obesity     Discharge Disposition:  Discharged to home     Allergies:  Allergies   Allergen Reactions     Asa [Aspirin] Hives     Codeine Hives        Discharge Medications:   Discharge Medication List as of 8/12/2022  5:08 PM      START taking these medications    Details   clindamycin (CLEOCIN) 900 MG/50ML infusion Inject 50 mLs (900 mg) into the vein every 8 hours for 28 days Weekly CBC/diff,creat,SGOT,ESR,CRP to Giancarlo, Disp-4200 mL, R-0, Infusion Therapy      enoxaparin ANTICOAGULANT (LOVENOX) 40 MG/0.4ML syringe Inject 0.4 mLs (40 mg) Subcutaneous daily for 14 days To minimize risk of post-op blood clots, Disp-5.6 mL, R-0, E-PrescribePlease provide pre-filled syringes if possible      miconazole (MICATIN) 2 % cream Place 1 applicator vaginally At Bedtime for 6 daysDisp-0.6 g, X-[id number]      oxyCODONE (ROXICODONE) 5 MG tablet Take 1 tablet (5 mg) by mouth every 4 hours as needed for severe pain . May cause constipation, sedation. No driving if taking this med., Disp-15 tablet, R-0, E-Prescribe      senna-docusate (SENOKOT-S/PERICOLACE) 8.6-50 MG tablet Take 1-2 tablets by mouth 2 times daily Take while on oral narcotics to prevent or treat constipation., Disp-30 tablet, R-0, E-PrescribeWhile taking narcotics         CONTINUE these medications which have CHANGED    Details   acetaminophen (TYLENOL) 500 MG tablet Take 2 tablets  "(1,000 mg) by mouth 3 times daily. May also take 1 tablet (500 mg) 2 times daily as needed for fever or pain. . Do not exceed 4000 mg (8 tablets) acetaminophen in 24 hours from all meds., Historical         CONTINUE these medications which have NOT CHANGED    Details   albuterol (ALBUTEROL) 108 (90 BASE) MCG/ACT inhaler Inhale 1-2 puffs into the lungs every 4 hours as needed for shortness of breath / dyspnea, Disp-1 Inhaler, R-11, E-Prescribe      Aspirin-Acetaminophen-Caffeine (EXCEDRIN PO) Take 2 tablets by mouth daily as needed (Headaches), Historical      PRENATAL VIT-DOCUSATE-IRON-FA PO Take 1 tablet by mouth daily, Historical              Condition on Discharge:  Discharge condition: Stable   Discharge vitals: Blood pressure 129/67, pulse 83, temperature 97.8  F (36.6  C), temperature source Temporal, resp. rate 20, height 1.626 m (5' 4\"), weight 89.6 kg (197 lb 9.6 oz), last menstrual period 04/14/2022, SpO2 100 %, unknown if currently breastfeeding.   Code status on discharge: Full Code     History of Illness:  See detailed admission note for full details.    Significant Physical Exam Findings:    Constitutional: Awake, alert, cooperative, no apparent distress      Respiratory: Clear to auscultation bilaterally, no crackles or wheezing   Cardiovascular: Regular rate and rhythm, normal S1 and S2, and no murmur noted   Abdomen: Normal bowel sounds, soft, non-distended, non-tender   Skin: No new rashes, no cyanosis, dry to touch   Neuro: Alert with  no new focal weakness   Extremities: Left foot dressed and immobilized, drainage in place.   Other(s):           All other systems: Negative         Procedures other than Imaging:  None     Imaging:  Results for orders placed or performed during the hospital encounter of 08/07/22   CT Foot Left w Contrast    Narrative    FINAL REPORT    I agree with the preliminary report. Dorsal plate and screw fixation with bone graft at the second and third TMT joints including " fixation of the proximal 2nd and 3rd metatarsal fractures. Screw fixation of the medial to middle cuneiform. Ununited   fracture of the base of the 4th metatarsal. No hardware fracture or lucency adjacent to the hardware fixation. No acute fractures are evident. Diffuse soft tissue swelling of the dorsum of the foot with scattered areas of bone graft/osseous debris. No   subcutaneous abscess or subcutaneous emphysema. Additional mild diffuse subcutaneous edema in the ankle. No joint effusions. No retracted tendon tear. Normal alignment of the peroneal tendons.    Final Interpretation Dictated By: Max Abrams MD  Date: 8/8/2022       PRELIMINARY EXAM: CT FOOT LEFT W CONTRAST  LOCATION: [facility]  DATE/TIME: 8/7/2022 10:42 PM    INDICATION: Surgery, now signs of infection.  COMPARISON: None.  TECHNIQUE: IV contrast. Axial, sagittal and coronal thin-section reconstruction. Dose reduction techniques were used.   CONTRAST: 100 mL Isovue 370.    PRELIMINARY FINDINGS:   Postsurgical change with plate and screw fixation across the second and third tarsometatarsal joints. Additional screw extending from the medial cuneiform to the base of the second metatarsal. Uncertain if ill-defined sclerosis at base of second and   third metatarsals is related to healing of prior fractures. There is mineralization within adjacent soft tissues and soft tissue edema along the dorsal lateral midfoot. Infection not excluded.    Please see final interpretation by [facility] in the a.m.    Preliminary Interpretation Dictated By: Soni Elias MD  Date: 8/7/2022    US Upper Extremity Venous Duplex Right    Narrative    US UPPER EXTREMITY VENOUS DUPLEX RIGHT  8/12/2022 3:46 PM     HISTORY: Status post PICC line, rule out blood clot.    COMPARISON: None.    TECHNIQUE: Color Doppler and spectral waveform analysis obtained  throughout the deep and superficial veins of the right upper  extremity.    FINDINGS: The right  "internal jugular, subclavian, axillary, and  brachial veins demonstrate normal blood flow, compression (where  applicable), and augmentation. Basilic and cephalic veins are patent.  Radial and ulnar veins are patent.      Impression    IMPRESSION: Negative for deep venous thrombosis throughout the right  upper extremity.    PLACIDO BENDER MD         SYSTEM ID:  [id number]        Consultations:  Consultation during this admission received from infectious disease and orthopedics.     Recent Lab Results:  Recent Labs   Lab 08/10/22  0731 08/09/22  0632 08/08/22  0631 08/07/22  2125   WBC 10.4  --  7.9 10.3   HGB 9.1*  9.1* 8.8* 8.8* 9.1*   HCT 30.0*  --  29.0* 29.7*   MCV 89  --  88 87     --  315 342     Recent Labs   Lab 08/12/22  0555 08/11/22  0923 08/10/22  0731    136 137   POTASSIUM 4.1 4.2 4.1   CHLORIDE 107 105 105   CO2 23 24 21*   ANIONGAP 7 7 11   GLC 79 82 80   BUN 8.7 9.9 7.7   CR 1.10* 1.26* 1.14*  1.20*   GFRESTIMATED 64 54* 61  58*   СВЕТЛАНА 8.6 8.8 8.9     Recent Labs   Lab 08/12/22  0555 08/11/22  0923 08/10/22  0731 08/10/22  0538 08/09/22  0632   GLC 79 82 80 92 86          Pending Results:    Unresulted Labs Ordered in the Past 30 Days of this Admission     Date and Time Order Name Status Description    8/8/2022  9:38 AM Anaerobic Bacterial Culture Routine Preliminary               Home Care Referral      Home infusion referral      When to call - Contact Surgeon Team    You may experience symptoms that require follow-up before your scheduled appointment. Refer to the \"Stoplight Tool\" for instructions on when to contact your Surgeon Team if you are concerned about pain control, blood clots, constipation, or if you are unable to urinate.     Discharge Instruction - Breathing exercises    Perform breathing exercises using your Incentive Spirometer 10 times per hour while awake for 2 weeks.     Symptoms - Constipation management    Constipation (hard, dry bowel movements) is not uncommon " "after an orthopedic injury due to the combination of being less active and opioid pain medication.  You can do the following to help reduce constipation:  ~  FLUIDS:  Drink clear liquids (water or Gatorade), or juice (apple/prune).  ~  DIET:  Eat a fiber rich diet.    ~  ACTIVITY:  Get up and move around several times a day.  Increase your activity as you are able.  MEDICATIONS:  Reduce the risk of constipation by starting medications before you are constipated.  You can take Miralax (1 capful mixed in at least 8 oz fluids, 1-2 times per day) and/or a laxative (Senokot-S, 1-2 tablets taken 1-2 times a day).  Do not take bowel meds if you have loose stools.  If you already have constipation and these medications are not working, you try over-the-counter milk of magnesia, Dulcolax suppositories, or magnesium citrate (use as per package instructions).  Call your primary care provider if it has been greater than 3 days since your last bowel movement.     Comfort and Pain Management - Pain after Surgery    Pain after surgery is normal and expected.  You will have some amount of pain for several weeks after surgery.  Your pain will improve with time.  There are several things you can do to help reduce your pain including: rest, ice, elevation, and using pain medications as needed. Contact your Surgeon Team if you have pain that persists or worsens after surgery despite rest, ice, elevation, and taking your medication(s) as prescribed. Contact your Surgeon Team if you have new numbness, tingling, or weakness in your operative extremity.     Comfort and Pain Management - LOWER Extremity Elevation    Swelling is expected for several weeks-to-months after an injury or surgery. This type of swelling is usually associated with gravity and activity, and can be improved with elevation.  The best way to decrease the swelling is to elevate your leg with your \"toes above your nose\".  It may be easiest to achieve this position by " laying down and placing several pillows lengthwise under your calf and heel. When elevating your leg keep your knee completely straight.  Elevate your leg as often as possible especially for the first 2-3 weeks after injury or surgery.     Comfort and Pain Management - Cold therapy    Ice can be used to control swelling and discomfort after injury or surgery  Place a thin towel over your affected joint and lay an ice pack or bag or frozen veggies overtop.  Leave the ice pack in place for 20-30 minutes, then remove and leave off for 20 minutes. Repeat this 20 minutes on - 20 minutes off routine as often as tolerated.  Monitor skin for any signs of frostbite (skin that stays white and numb even after the ice has been removed for 20 minutes, etc).     Medication Instructions - Acetaminophen (TYLENOL) Instructions    If tolerated, please use acetaminophen (TYLENOL) for pain relief.  This can be used in addition to an NSAID and an opiate/narcotic medication.  Acetaminophen is most effective for managing pain symptoms when taken on a schedule  (every four, six, or eight hours).  For people without liver disease, max acetaminophen dose is 4000 mg in 24 hours from all medications.  Do NOT exceed this daily dose.  Most patients use acetaminophen for pain control for the first four weeks after injury.  You can wean from this medication as your pain decreases.     Medication Instructions - Opioid Instructions (0.5 - 1 tablet Q 4-6 hours, MAX 4 tablets)    You are discharging with an opioid medication (oxycodone). This medication should only be taken for severe, breakthrough pain that is not controlled with acetaminophen (TYLENOL). If you rate your pain less than 5, you do not need this medication.  Pain rating 0-5:  You do not need a narcotic/opiate medication.  Use acetaminophen, elevation, ice, and rest for pain relief.                Pain rating 6-10:  Take 1 tablet every 4-6 hours as needed               Do not exceed 4  tablets per day.  Opiates / narcotics will cause constipation and sleepiness.  No driving if taking this medication.  Use sparingly and with caution - narcotics are known to be habit-forming / addictive.  If you find you are having a hard time weaning off this medication or are concerned that you have developed a dependence, it is very important that you talk to your primary care provider to discuss strategies and create a plan. DO NOT drink alcohol if taking narcotics (such as oxycodone) or other sedating medications.     Medication Instructions - Opioids - Tapering Instructions    In the first three days following surgery, your symptoms may warrant use of the narcotic pain medication every four to six hours as prescribed. This is normal. As your pain symptoms improve, focus your efforts on decreasing (tapering) use of narcotic medications. The most successful tapering strategy is to first, decrease the number of tablets you take every 4-6 hours to the minimum prescribed. Then, increase the amount of time between doses.  For example:  First, taper to   or 1 tablet every 4-6 hours.  Then, taper to   or 1 tablet every 6-8 hours.  Then, taper to   or 1 tablet every 8-10 hours.  Then, taper to   or 1 tablet every 10-12 hours.  Then, taper to   or 1 tablet at bedtime.  The bedtime dose can help with comfort during sleep and is typically the last dose to be discontinued after surgery.     Follow Up Care    Please follow-up with Dr Chacon and Shu MCKEON, [facility], in clinic for a 2 week post-op appointment.  Please call Dr Chacon's patient coordinator, Sravani, to schedule this appointment and also with any questions or concerns.  She can be reached during normal business hours (M-F, 8am-5pm) at [phone].   If you have any questions or concerns after-hours, please contact the on call surgeon at [phone].     Return to Driving    Return to driving - Driving is NOT  permitted until directed by your provider. Under no circumstance are you permitted to drive while using narcotic pain medications.     NO weight bearing    DO NOT put weight on your left leg/foot.  Any amount of weight on the fracture will delay healing.     Dressing / Wound Care - Wound    You have a clean dressing on your surgical wound.  DO NOT get wet.  You can shower but you must be seated and wearing a waterproof plastic covering over your CAM boot.              Please change dressing daily and as needed.  Use a soft gauze dressing secured with an ACE wrap.  Monitor incision closely.  Contact your Surgeon Team if you have increased redness, warmth around the surgical wound, and/or drainage from the surgical wound.     Dressing / Wound Care - NO Tub Bathing    Tub bathing, swimming, or any other activities that will cause your incision to be submerged in water should be avoided until allowed by your Surgeon.     Ankle Foot Orthotic (AFO) or Cam-Walker    Remove CAM boot twice daily for skin assessment and hygiene.  Please wear CAM boot at all times except for skin assessment and hygiene. When removing, ensure that you are safely seated or lying in bed.     Reason for your hospital stay    Status post left foot wound debridement with delayed secondary closure for MRSA infection.     Follow-up and recommended labs and tests     Follow up with primary care provider, Madelyn Howard, within 7 days for hospital follow- up.    Follow up with OB/GYN as outpatient for routine prenatal follow up.  Follow up with Orthopedics as instructed.  Follow up with ID regarding Iv antibiotics.     Activity    Your activity upon discharge: Per Ortho recommendation     Rolling Knee Walker DME    DME Documentation: Describe the reason for need to support medical necessity: Impaired gait due to Foot/Ankle surgery. Anticipated length of need: 3 months     Walker DME    DME Documentation: Describe the reason for need to support medical  necessity: Impaired gait due to Foot/Ankle surgery. Anticipated length of need: 3 months     Diet    Follow this diet upon discharge: Orders Placed This Encounter      Advance Diet as Tolerated: Regular Diet Adult       Hospital Course:  Simran Fish is a 42 year old -American female with a significant past medical history of left foot fracture and dislocation which required operative reduction internal fixation by Dr. Chacon on July 13, 2022 who presents with left foot swelling and drainage from incisional wound.     On presentation to emergency department blood pressure 129/85, heart rate 89, temperature 99.8.  Respiratory rate 20 and oxygen saturation 98%.     Blood work revealed sodium of 134, calcium 8.4, otherwise unremarkable comprehensive metabolic panel.  WBC count 10.3, hemoglobin 9.1, platelet 342.     CT of the left foot showed mineralization within the adjacent soft tissue and soft tissue edema along the dorsal lateral midfoot.  Further musculoskeletal radiology review pending.     Orthopedic surgery was consulted and Dr. Trejo was contacted who recommended admission and incision and drainage next day.     Patient was taken to the OR on August 8 and had left foot wound debridement.     Problem List with Assessment and Plan:     1.  Status post left foot wound debridement with delayed secondary closure.  -Patient developed pain and swelling.  Erythema or drainage of the left foot incisional wound following ORIF of left foot fracture on  July 13.   -There was concern for wound infection/deep infection and underwent left foot wound debridement on 8/8/20/2022. Orthopedic surgery consulted did incision debridement of the recent ORIF of the left foot fracture.  On admission started on vancomycin which later stopped and changed to clindamycin.  Wound culture grew MRSA, sensitive to Clinda..The anaerobic culture also grew anaerococcus species. She remained afebrile and hemodynamically stable.  I  recommended to discharge patient on IV clindamycin for over 4 weeks and needs close follow-up with orthopedic surgery. Patient wanted fungal infection prophylaxis but as she is pregnant and due to risk of systemic antifungal we are unable to give her Diflucan.  Patient will use topical antifungal agents as needed for infection.  DVT prophylaxis patient will be on Lovenox injection up to her discharge.  Advised her to continue her Lovenox injection until she is able to walk.  She also has PICC line in place and as she is pregnant she is at increased risk of thromboembolism and encouraged to continue her DVT prophylaxis.     2. Second trimester pregnancy. Follows with [facility] in [place]. Advised follow up with OB as an outpatient. Plan to avoid NSAIDs and continue close monitoring with her obstetrician.     3.  History of vasovagal syncope with close displaced fracture of metatarsal bone of the left foot June 27, 2022.  Seen in the emergency department on June 27 and July 3 acute after a fall.  Patient was subsequently seen by orthopedic surgery and surgical intervention done on July 13 as above.  Patient was advised not to put weight on her foot, unfortunately she has many kids and needed to get around, which resulted in wound dehiscence and infection requiring incision debridement and need for IV antibiotic.     4.  Asthma: Stable     5.  MAGDA: Baseline creatinine was 0.6, increased to 1.26, vancomycin changed to clindamycin creatinine improved.  She will have her BMP checked as an outpatient. Encourage oral intake of fluids. BMP in the morning.     6. UTI: UA was mildly positive, urine culture grew out E. Coli.  -Patient is asymptomatic.  Treated with IV antibiotic Zosyn while in the hospital and completed a course of treatment for the UTI.      Clinically Significant Risk Factors Present on Admission                  # Obesity: Estimated body mass index is 33.92 kg/m  as calculated from the following:     "Height as of this encounter: 1.626 m (5' 4\").    Weight as of this encounter: 89.6 kg (197 lb 9.6 oz).    I discussed with patient at length the plan of care all her question concerns.  Also discussed with orthopedic surgery and infectious disease.     Total time spent in face to face contact with the patient and coordinating discharge was:  >30 Minutes.  "

## 2022-08-14 ENCOUNTER — NURSE TRIAGE (OUTPATIENT)
Dept: NURSING | Facility: CLINIC | Age: 42
End: 2022-08-14

## 2022-08-14 ENCOUNTER — PATIENT OUTREACH (OUTPATIENT)
Dept: CARE COORDINATION | Facility: CLINIC | Age: 42
End: 2022-08-14

## 2022-08-14 NOTE — PROGRESS NOTES
Clinic Care Coordination Contact  [facility]/Voicemail       Clinical Data: Care Coordinator Outreach  Outreach attempted x 1.  Left message on patient's voicemail with call back information and requested return call.  Plan: Care Coordinator will try to reach patient again in 1-2 business days.    Suzy Richey  Care Transitions Assistant  [facility]

## 2022-08-14 NOTE — TELEPHONE ENCOUNTER
Pt sent home on home injections and pt does not know how to work the needles.  Helped pt with instruction video on line - pt has viewed and feels comfortable doing self injection.  Brittany Santana RN  [facility] Nurse Advisor

## 2022-08-15 LAB
BACTERIA TISS BX CULT: ABNORMAL
BACTERIA TISS BX CULT: ABNORMAL

## 2022-08-15 NOTE — PROGRESS NOTES
Clinic Care Coordination Contact  [facility]/Voicemail       Clinical Data: Care Coordinator Outreach  Outreach attempted x 2.  Left message on patient's voicemail with call back information and requested return call.  Plan: Care Coordinator will do no further outreaches at this time.    Radha Piedra  Community Health Worker  [facility]  [phone]

## 2022-08-16 ENCOUNTER — LAB REQUISITION (OUTPATIENT)
Dept: LAB | Facility: CLINIC | Age: 42
End: 2022-08-16
Payer: COMMERCIAL

## 2022-08-16 ENCOUNTER — HOME INFUSION (PRE-WILLOW HOME INFUSION) (OUTPATIENT)
Dept: PHARMACY | Facility: CLINIC | Age: 42
End: 2022-08-16

## 2022-08-16 DIAGNOSIS — A49.02 METHICILLIN RESISTANT STAPHYLOCOCCUS AUREUS INFECTION, UNSPECIFIED SITE: ICD-10-CM

## 2022-08-16 LAB
AST SERPL W P-5'-P-CCNC: 11 U/L (ref 10–35)
BASOPHILS # BLD AUTO: 0 10E3/UL (ref 0–0.2)
BASOPHILS NFR BLD AUTO: 0 %
BUN SERPL-MCNC: 6.5 MG/DL (ref 6–20)
CREAT SERPL-MCNC: 0.9 MG/DL (ref 0.51–0.95)
CRP SERPL-MCNC: 59.59 MG/L
EOSINOPHIL # BLD AUTO: 0.3 10E3/UL (ref 0–0.7)
EOSINOPHIL NFR BLD AUTO: 3 %
ERYTHROCYTE [DISTWIDTH] IN BLOOD BY AUTOMATED COUNT: 15.5 % (ref 10–15)
ERYTHROCYTE [SEDIMENTATION RATE] IN BLOOD BY WESTERGREN METHOD: 83 MM/HR (ref 0–20)
GFR SERPL CREATININE-BSD FRML MDRD: 81 ML/MIN/1.73M2
HCT VFR BLD AUTO: 27.9 % (ref 35–47)
HGB BLD-MCNC: 8.5 G/DL (ref 11.7–15.7)
IMM GRANULOCYTES # BLD: 0.1 10E3/UL
IMM GRANULOCYTES NFR BLD: 1 %
LYMPHOCYTES # BLD AUTO: 1.4 10E3/UL (ref 0.8–5.3)
LYMPHOCYTES NFR BLD AUTO: 15 %
MCH RBC QN AUTO: 26.6 PG (ref 26.5–33)
MCHC RBC AUTO-ENTMCNC: 30.5 G/DL (ref 31.5–36.5)
MCV RBC AUTO: 87 FL (ref 78–100)
MONOCYTES # BLD AUTO: 0.8 10E3/UL (ref 0–1.3)
MONOCYTES NFR BLD AUTO: 8 %
NEUTROPHILS # BLD AUTO: 7.1 10E3/UL (ref 1.6–8.3)
NEUTROPHILS NFR BLD AUTO: 73 %
NRBC # BLD AUTO: 0 10E3/UL
NRBC BLD AUTO-RTO: 0 /100
PLATELET # BLD AUTO: 346 10E3/UL (ref 150–450)
RBC # BLD AUTO: 3.2 10E6/UL (ref 3.8–5.2)
WBC # BLD AUTO: 9.7 10E3/UL (ref 4–11)

## 2022-08-16 PROCEDURE — 82565 ASSAY OF CREATININE: CPT | Performed by: INTERNAL MEDICINE

## 2022-08-16 PROCEDURE — 86140 C-REACTIVE PROTEIN: CPT | Performed by: INTERNAL MEDICINE

## 2022-08-16 PROCEDURE — 84450 TRANSFERASE (AST) (SGOT): CPT | Performed by: INTERNAL MEDICINE

## 2022-08-16 PROCEDURE — 85652 RBC SED RATE AUTOMATED: CPT | Performed by: INTERNAL MEDICINE

## 2022-08-16 PROCEDURE — 84520 ASSAY OF UREA NITROGEN: CPT | Performed by: INTERNAL MEDICINE

## 2022-08-16 PROCEDURE — 85025 COMPLETE CBC W/AUTO DIFF WBC: CPT | Performed by: INTERNAL MEDICINE

## 2022-08-16 NOTE — PROGRESS NOTES
This is a recent snapshot of the patient's [facility] medical record.  For current drug dose and complete information and questions, call [phone] or In Basket pool, fv home infusion ([id number])  CSN Number:  [id number]

## 2022-08-17 ENCOUNTER — HOME INFUSION (PRE-WILLOW HOME INFUSION) (OUTPATIENT)
Dept: PHARMACY | Facility: CLINIC | Age: 42
End: 2022-08-17

## 2022-08-18 ENCOUNTER — HOME INFUSION (PRE-WILLOW HOME INFUSION) (OUTPATIENT)
Dept: PHARMACY | Facility: CLINIC | Age: 42
End: 2022-08-18

## 2022-08-18 NOTE — PROGRESS NOTES
This is a recent snapshot of the patient's [facility] medical record.  For current drug dose and complete information and questions, call [phone] or In Basket pool, fv home infusion ([id number])  CSN Number:  [id number]

## 2022-08-19 NOTE — PROGRESS NOTES
This is a recent snapshot of the patient's [facility] medical record.  For current drug dose and complete information and questions, call [phone] or In [facility] pool, fv home infusion ([id number])  CSN Number:  [id number]

## 2022-08-22 ENCOUNTER — HOME INFUSION (PRE-WILLOW HOME INFUSION) (OUTPATIENT)
Dept: PHARMACY | Facility: CLINIC | Age: 42
End: 2022-08-22

## 2022-08-23 ENCOUNTER — LAB REQUISITION (OUTPATIENT)
Dept: LAB | Facility: CLINIC | Age: 42
End: 2022-08-23
Payer: COMMERCIAL

## 2022-08-23 ENCOUNTER — HOME INFUSION (PRE-WILLOW HOME INFUSION) (OUTPATIENT)
Dept: PHARMACY | Facility: CLINIC | Age: 42
End: 2022-08-23

## 2022-08-23 DIAGNOSIS — A49.02 METHICILLIN RESISTANT STAPHYLOCOCCUS AUREUS INFECTION, UNSPECIFIED SITE: ICD-10-CM

## 2022-08-23 LAB
AST SERPL W P-5'-P-CCNC: 7 U/L (ref 0–45)
BASOPHILS # BLD AUTO: 0 10E3/UL (ref 0–0.2)
BASOPHILS NFR BLD AUTO: 0 %
BUN SERPL-MCNC: 5 MG/DL (ref 7–30)
CREAT SERPL-MCNC: 0.86 MG/DL (ref 0.52–1.04)
CRP SERPL-MCNC: 16 MG/L (ref 0–8)
EOSINOPHIL # BLD AUTO: 0.3 10E3/UL (ref 0–0.7)
EOSINOPHIL NFR BLD AUTO: 3 %
ERYTHROCYTE [DISTWIDTH] IN BLOOD BY AUTOMATED COUNT: 15.6 % (ref 10–15)
ERYTHROCYTE [SEDIMENTATION RATE] IN BLOOD BY WESTERGREN METHOD: 118 MM/HR (ref 0–20)
GFR SERPL CREATININE-BSD FRML MDRD: 86 ML/MIN/1.73M2
HCT VFR BLD AUTO: 27 % (ref 35–47)
HGB BLD-MCNC: 8.7 G/DL (ref 11.7–15.7)
IMM GRANULOCYTES # BLD: 0.2 10E3/UL
IMM GRANULOCYTES NFR BLD: 1 %
LYMPHOCYTES # BLD AUTO: 2.1 10E3/UL (ref 0.8–5.3)
LYMPHOCYTES NFR BLD AUTO: 17 %
MCH RBC QN AUTO: 27.4 PG (ref 26.5–33)
MCHC RBC AUTO-ENTMCNC: 32.2 G/DL (ref 31.5–36.5)
MCV RBC AUTO: 85 FL (ref 78–100)
MONOCYTES # BLD AUTO: 0.7 10E3/UL (ref 0–1.3)
MONOCYTES NFR BLD AUTO: 5 %
NEUTROPHILS # BLD AUTO: 9.2 10E3/UL (ref 1.6–8.3)
NEUTROPHILS NFR BLD AUTO: 74 %
NRBC # BLD AUTO: 0 10E3/UL
NRBC BLD AUTO-RTO: 0 /100
PLATELET # BLD AUTO: 473 10E3/UL (ref 150–450)
RBC # BLD AUTO: 3.17 10E6/UL (ref 3.8–5.2)
WBC # BLD AUTO: 12.4 10E3/UL (ref 4–11)

## 2022-08-23 PROCEDURE — 82565 ASSAY OF CREATININE: CPT | Performed by: INTERNAL MEDICINE

## 2022-08-23 PROCEDURE — 85025 COMPLETE CBC W/AUTO DIFF WBC: CPT | Performed by: INTERNAL MEDICINE

## 2022-08-23 PROCEDURE — 84450 TRANSFERASE (AST) (SGOT): CPT | Performed by: INTERNAL MEDICINE

## 2022-08-23 PROCEDURE — 85652 RBC SED RATE AUTOMATED: CPT | Performed by: INTERNAL MEDICINE

## 2022-08-23 PROCEDURE — 84520 ASSAY OF UREA NITROGEN: CPT | Performed by: INTERNAL MEDICINE

## 2022-08-23 PROCEDURE — 86140 C-REACTIVE PROTEIN: CPT | Performed by: INTERNAL MEDICINE

## 2022-08-24 ENCOUNTER — HOME INFUSION (PRE-WILLOW HOME INFUSION) (OUTPATIENT)
Dept: PHARMACY | Facility: CLINIC | Age: 42
End: 2022-08-24

## 2022-08-25 NOTE — PROGRESS NOTES
This is a recent snapshot of the patient's [facility] medical record.  For current drug dose and complete information and questions, call [phone] or In [facility] pool, fv home infusion ([id number])  CSN Number:  [id number]

## 2022-08-25 NOTE — PROGRESS NOTES
This is a recent snapshot of the patient's [facility] medical record.  For current drug dose and complete information and questions, call [phone] or In Basket pool, fv home infusion ([id number])  CSN Number:  [id number]

## 2022-08-29 NOTE — PROGRESS NOTES
This is a recent snapshot of the patient's [facility] medical record.  For current drug dose and complete information and questions, call [phone] or In Basket pool, fv home infusion ([id number])  CSN Number:  [id number]

## 2022-08-30 ENCOUNTER — HOME INFUSION (PRE-WILLOW HOME INFUSION) (OUTPATIENT)
Dept: PHARMACY | Facility: CLINIC | Age: 42
End: 2022-08-30

## 2022-08-31 ENCOUNTER — HOME INFUSION (PRE-WILLOW HOME INFUSION) (OUTPATIENT)
Dept: PHARMACY | Facility: CLINIC | Age: 42
End: 2022-08-31

## 2022-09-01 ENCOUNTER — HOME INFUSION (PRE-WILLOW HOME INFUSION) (OUTPATIENT)
Dept: PHARMACY | Facility: CLINIC | Age: 42
End: 2022-09-01

## 2022-09-01 ENCOUNTER — LAB REQUISITION (OUTPATIENT)
Dept: LAB | Facility: CLINIC | Age: 42
End: 2022-09-01
Payer: COMMERCIAL

## 2022-09-01 DIAGNOSIS — A49.02 METHICILLIN RESISTANT STAPHYLOCOCCUS AUREUS INFECTION, UNSPECIFIED SITE: ICD-10-CM

## 2022-09-01 LAB
AST SERPL W P-5'-P-CCNC: 8 U/L (ref 0–45)
BASOPHILS # BLD AUTO: 0 10E3/UL (ref 0–0.2)
BASOPHILS NFR BLD AUTO: 0 %
BUN SERPL-MCNC: 6 MG/DL (ref 7–30)
CRP SERPL-MCNC: 9.3 MG/L (ref 0–8)
EOSINOPHIL # BLD AUTO: 0.2 10E3/UL (ref 0–0.7)
EOSINOPHIL NFR BLD AUTO: 2 %
ERYTHROCYTE [DISTWIDTH] IN BLOOD BY AUTOMATED COUNT: 15.4 % (ref 10–15)
ERYTHROCYTE [SEDIMENTATION RATE] IN BLOOD BY WESTERGREN METHOD: 70 MM/HR (ref 0–20)
HCT VFR BLD AUTO: 26.8 % (ref 35–47)
HGB BLD-MCNC: 8.4 G/DL (ref 11.7–15.7)
IMM GRANULOCYTES # BLD: 0.1 10E3/UL
IMM GRANULOCYTES NFR BLD: 1 %
LYMPHOCYTES # BLD AUTO: 1.9 10E3/UL (ref 0.8–5.3)
LYMPHOCYTES NFR BLD AUTO: 16 %
MCH RBC QN AUTO: 26.7 PG (ref 26.5–33)
MCHC RBC AUTO-ENTMCNC: 31.3 G/DL (ref 31.5–36.5)
MCV RBC AUTO: 85 FL (ref 78–100)
MONOCYTES # BLD AUTO: 0.7 10E3/UL (ref 0–1.3)
MONOCYTES NFR BLD AUTO: 6 %
NEUTROPHILS # BLD AUTO: 8.9 10E3/UL (ref 1.6–8.3)
NEUTROPHILS NFR BLD AUTO: 75 %
NRBC # BLD AUTO: 0 10E3/UL
NRBC BLD AUTO-RTO: 0 /100
PLATELET # BLD AUTO: 417 10E3/UL (ref 150–450)
RBC # BLD AUTO: 3.15 10E6/UL (ref 3.8–5.2)
WBC # BLD AUTO: 12 10E3/UL (ref 4–11)

## 2022-09-01 PROCEDURE — 85652 RBC SED RATE AUTOMATED: CPT | Performed by: INTERNAL MEDICINE

## 2022-09-01 PROCEDURE — 86140 C-REACTIVE PROTEIN: CPT | Performed by: INTERNAL MEDICINE

## 2022-09-01 PROCEDURE — 84450 TRANSFERASE (AST) (SGOT): CPT | Performed by: INTERNAL MEDICINE

## 2022-09-01 PROCEDURE — 85025 COMPLETE CBC W/AUTO DIFF WBC: CPT | Performed by: INTERNAL MEDICINE

## 2022-09-01 PROCEDURE — 84520 ASSAY OF UREA NITROGEN: CPT | Performed by: INTERNAL MEDICINE

## 2022-09-02 ENCOUNTER — HOME INFUSION (PRE-WILLOW HOME INFUSION) (OUTPATIENT)
Dept: PHARMACY | Facility: CLINIC | Age: 42
End: 2022-09-02

## 2022-09-06 ENCOUNTER — LAB REQUISITION (OUTPATIENT)
Dept: LAB | Facility: CLINIC | Age: 42
End: 2022-09-06
Payer: COMMERCIAL

## 2022-09-06 ENCOUNTER — HOME INFUSION (PRE-WILLOW HOME INFUSION) (OUTPATIENT)
Dept: PHARMACY | Facility: CLINIC | Age: 42
End: 2022-09-06

## 2022-09-06 DIAGNOSIS — A49.02 METHICILLIN RESISTANT STAPHYLOCOCCUS AUREUS INFECTION, UNSPECIFIED SITE: ICD-10-CM

## 2022-09-06 LAB
AST SERPL W P-5'-P-CCNC: 10 U/L (ref 0–45)
BASOPHILS # BLD AUTO: 0 10E3/UL (ref 0–0.2)
BASOPHILS NFR BLD AUTO: 0 %
BUN SERPL-MCNC: 10 MG/DL (ref 7–30)
CREAT SERPL-MCNC: 0.81 MG/DL (ref 0.52–1.04)
CRP SERPL-MCNC: 8.8 MG/L (ref 0–8)
EOSINOPHIL # BLD AUTO: 0.5 10E3/UL (ref 0–0.7)
EOSINOPHIL NFR BLD AUTO: 4 %
ERYTHROCYTE [DISTWIDTH] IN BLOOD BY AUTOMATED COUNT: 15.4 % (ref 10–15)
ERYTHROCYTE [SEDIMENTATION RATE] IN BLOOD BY WESTERGREN METHOD: 67 MM/HR (ref 0–20)
GFR SERPL CREATININE-BSD FRML MDRD: >90 ML/MIN/1.73M2
HCT VFR BLD AUTO: 26.5 % (ref 35–47)
HGB BLD-MCNC: 8.6 G/DL (ref 11.7–15.7)
IMM GRANULOCYTES # BLD: 0.1 10E3/UL
IMM GRANULOCYTES NFR BLD: 1 %
LYMPHOCYTES # BLD AUTO: 2.4 10E3/UL (ref 0.8–5.3)
LYMPHOCYTES NFR BLD AUTO: 19 %
MCH RBC QN AUTO: 27.6 PG (ref 26.5–33)
MCHC RBC AUTO-ENTMCNC: 32.5 G/DL (ref 31.5–36.5)
MCV RBC AUTO: 85 FL (ref 78–100)
MONOCYTES # BLD AUTO: 0.9 10E3/UL (ref 0–1.3)
MONOCYTES NFR BLD AUTO: 7 %
NEUTROPHILS # BLD AUTO: 8.6 10E3/UL (ref 1.6–8.3)
NEUTROPHILS NFR BLD AUTO: 69 %
NRBC # BLD AUTO: 0 10E3/UL
NRBC BLD AUTO-RTO: 0 /100
PLATELET # BLD AUTO: 332 10E3/UL (ref 150–450)
RBC # BLD AUTO: 3.12 10E6/UL (ref 3.8–5.2)
WBC # BLD AUTO: 12.6 10E3/UL (ref 4–11)

## 2022-09-06 PROCEDURE — 85025 COMPLETE CBC W/AUTO DIFF WBC: CPT | Performed by: INTERNAL MEDICINE

## 2022-09-06 PROCEDURE — 84520 ASSAY OF UREA NITROGEN: CPT | Performed by: INTERNAL MEDICINE

## 2022-09-06 PROCEDURE — 82565 ASSAY OF CREATININE: CPT | Performed by: INTERNAL MEDICINE

## 2022-09-06 PROCEDURE — 85652 RBC SED RATE AUTOMATED: CPT | Performed by: INTERNAL MEDICINE

## 2022-09-06 PROCEDURE — 84450 TRANSFERASE (AST) (SGOT): CPT | Performed by: INTERNAL MEDICINE

## 2022-09-06 PROCEDURE — 86140 C-REACTIVE PROTEIN: CPT | Performed by: INTERNAL MEDICINE

## 2022-09-06 NOTE — PROGRESS NOTES
This is a recent snapshot of the patient's [facility] medical record.  For current drug dose and complete information and questions, call [phone] or In Basket pool, fv home infusion ([id number])  CSN Number:  [id number]      Burow's Graft Text: The defect edges were debeveled with a #15 scalpel blade.  Given the location of the defect, shape of the defect, the proximity to free margins and the presence of a standing cone deformity a Burow's skin graft was deemed most appropriate. The standing cone was removed and this tissue was then trimmed to the shape of the primary defect. The adipose tissue was also removed until only dermis and epidermis were left.  The skin margins of the secondary defect were undermined to an appropriate distance in all directions utilizing iris scissors.  The secondary defect was closed with interrupted buried subcutaneous sutures.  The skin edges were then re-apposed with running  sutures.  The skin graft was then placed in the primary defect and oriented appropriately.

## 2022-09-07 ENCOUNTER — HOME INFUSION (PRE-WILLOW HOME INFUSION) (OUTPATIENT)
Dept: PHARMACY | Facility: CLINIC | Age: 42
End: 2022-09-07

## 2022-09-07 LAB — HOLD SPECIMEN: NORMAL

## 2022-09-07 NOTE — PROGRESS NOTES
This is a recent snapshot of the patient's [facility] medical record.  For current drug dose and complete information and questions, call [phone] or In Basket pool, fv home infusion ([id number])  CSN Number:  [id number]

## 2022-09-08 NOTE — PROGRESS NOTES
This is a recent snapshot of the patient's [facility] medical record.  For current drug dose and complete information and questions, call [phone] or In Basket pool, fv home infusion ([id number])  CSN Number:  [id number]

## 2022-09-09 ENCOUNTER — HOME INFUSION (PRE-WILLOW HOME INFUSION) (OUTPATIENT)
Dept: PHARMACY | Facility: CLINIC | Age: 42
End: 2022-09-09

## 2022-10-25 NOTE — PROGRESS NOTES
This is a recent snapshot of the patient's [facility] medical record.  For current drug dose and complete information and questions, call [phone] or In Basket pool, fv home infusion ([id number])  CSN Number:  [id number]

## 2022-10-26 NOTE — PROGRESS NOTES
This is a recent snapshot of the patient's [facility] medical record.  For current drug dose and complete information and questions, call [phone] or In Basket pool, fv home infusion ([id number])  CSN Number:  [id number]

## 2022-11-02 NOTE — PROGRESS NOTES
This is a recent snapshot of the patient's [facility] medical record.  For current drug dose and complete information and questions, call [phone] or In Basket pool, fv home infusion ([id number])  CSN Number:  [id number]

## 2022-11-05 NOTE — PROGRESS NOTES
This is a recent snapshot of the patient's [facility] medical record.  For current drug dose and complete information and questions, call [phone] or In Basket pool, fv home infusion ([id number])  CSN Number:  [id number]

## 2022-11-13 NOTE — PROGRESS NOTES
This is a recent snapshot of the patient's [facility] medical record.  For current drug dose and complete information and questions, call [phone] or In Basket pool, fv home infusion ([id number])  CSN Number:  [id number]

## 2022-12-14 NOTE — ED NOTES
Abdominal cramping for about a week. Accompanied by occasional vomiting. Sore throat for the past two days. No diarrhea.   
Hypoxia

## 2023-01-01 NOTE — PROGRESS NOTES
This is a recent snapshot of the patient's [facility] medical record.  For current drug dose and complete information and questions, call [phone] or In Basket pool, fv home infusion ([id number])  CSN Number:  [id number]     None

## 2023-03-27 ENCOUNTER — HOSPITAL ENCOUNTER (EMERGENCY)
Facility: CLINIC | Age: 43
Discharge: LEFT BEFORE DISCHARGE | End: 2023-03-27
Payer: COMMERCIAL

## 2023-03-27 VITALS
SYSTOLIC BLOOD PRESSURE: 162 MMHG | HEART RATE: 100 BPM | OXYGEN SATURATION: 99 % | RESPIRATION RATE: 19 BRPM | DIASTOLIC BLOOD PRESSURE: 97 MMHG | TEMPERATURE: 97.5 F
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
## 2023-06-26 NOTE — CONSULTS
[facility]    Orthopedics Consultation    Date of Admission:  8/7/2022    Assessment & Plan     1.  Left foot wound dehiscence    Plan:    I recommended operative treatment of the patient's wound dehiscence.  We discussed the natural history of this diagnosis, and the plan for surgery.  We discussed the risks and benefits of the procedure, as well as the expected post-operative course.    Active Problems:    Displaced fracture of second metatarsal bone, left foot, initial encounter for closed fracture    Infection of deep incisional surgical site after procedure, initial encounter    Nasim Trejo MD     Code Status    Full Code    Reason for Consult   Reason for consult: I was asked by Dr. Nielsen to evaluate this patient for a left foot infection.    Primary Care Physician   Madelyn Howard    Chief Complaint   Left foot wound dehiscence    History is obtained from the patient    History of Present Illness   Simran Fish is a 42 year old female who presents with draining left foot wound.  She previously underwent Lisfranc ORIF and fusion with Dr. Chacon 3 weeks ago.  She does indicate she has been walking on this from time to time.  She stated that yesterday she noticed a dramatic increase in drainage from the foot as well as swelling and pain.  She presented to the orthopedic urgent care and was referred to the hospital.  She denies any fever chills or night sweats other symptoms or concerns.      Past Medical History   I have reviewed this patient's medical history and updated it with pertinent information if needed.   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  Past Surgical History:   Procedure Laterality Date     ARTHRODESIS FOOT Left 7/13/2022    Procedure: 1.  Open reduction internal fixation of left Lisfranc injury 2.  Left second tarsometatarsal joint arthrodesis 3.  Left third tarsometatarsal joint arthrodesis;  Surgeon: Aramis Chacon MD;  Location: [facility] OR     HERNIORRHAPHY UMBILICAL  04/23/2014    Procedure: HERNIORRHAPHY UMBILICAL;  Surgeon: Luz Kinney MD;  Location: [facility] OR     ORTHOPEDIC SURGERY Right 2019    ORIF hand     ZZC NONSPECIFIC PROCEDURE  03/01/2008    wisdom teeth       Prior to Admission Medications   Prior to Admission Medications   Prescriptions Last Dose Informant Patient Reported? Taking?   Aspirin-Acetaminophen-Caffeine (EXCEDRIN PO)   Yes Yes   Sig: Take 2 tablets by mouth daily as needed (Headaches)   PRENATAL VIT-DOCUSATE-IRON-FA PO 8/6/2022 at Unknown time  Yes Yes   Sig: Take 1 tablet by mouth daily   acetaminophen (TYLENOL) 500 MG tablet   Yes Yes   Sig: Take 500-1,000 mg by mouth every 6 hours as needed   albuterol (ALBUTEROL) 108 (90 BASE) MCG/ACT inhaler   No Yes   Sig: Inhale 1-2 puffs into the lungs every 4 hours as needed for shortness of breath / dyspnea      Facility-Administered Medications: None     Allergies   Allergies   Allergen Reactions     Asa [Aspirin] Hives     Codeine Hives       Social History   I have reviewed this patient's social history and updated it with pertinent information if needed. Simran Fish  reports that she has never smoked. She has never used smokeless tobacco. She reports that she does not drink alcohol and does not use drugs.    Family History   I have reviewed this patient's family history and updated it with pertinent information if needed.   Family History   Problem Relation Age of Onset     Diabetes Father      Cancer Paternal Grandmother         lung     Alcohol/Drug Mother      C.A.D. Paternal Grandfather      Cerebrovascular Disease Paternal Grandfather        Review  of Systems   The 10 point Review of Systems is negative other than noted in the HPI or here.     Physical Exam   Temp: 97.5  F (36.4  C) Temp src: Temporal BP: 126/74 Pulse: 74   Resp: 28 SpO2: 97 % O2 Device: None (Room air) Oxygen Delivery: 7 LPM  Vital Signs with Ranges  Temp:  [97  F (36.1  C)-99.8  F (37.7  C)] 97.5  F (36.4  C)  Pulse:  [] 74  Resp:  [12-28] 28  BP: (110-156)/() 126/74  SpO2:  [93 %-100 %] 97 %  197 lbs 9.6 oz    Constitutional: awake, alert, cooperative, no apparent distress, and appears stated age  Eyes: extra-ocular muscles intact, no scleral icterus  ENT: normocepalic, atraumatic without obvious abnormality  Hematologic / Lymphatic:  No lymphedema   Respiratory: no increased work of breathing, symmetric chest wall expansion  Skin: Superficial dehiscence of the left foot wound with fibrinous drainage some which is dried.  Some purulent drainage can be expressed.  Musculoskeletal: Moderate swelling left foot.  Tender palpation left foot.  Neurologic: Cranial Nerves:  cranial nerves II-XII are grossly intact  Motor Exam:  moves all extremities well and symmetrically  Sensory:  Sensory intact  Neuropsychiatric: General: normal, calm and normal eye contact  Level of consciousness: alert / normal  Affect: normal  Orientation: oriented to self, place, time and situation    Data   Results for orders placed or performed during the hospital encounter of 08/07/22 (from the past 24 hour(s))   CBC with platelets differential    Narrative    The following orders were created for panel order CBC with platelets differential.  Procedure                               Abnormality         Status                     ---------                               -----------         ------                     CBC with platelets and d...[[id number]]  Abnormal            Final result                 Please view results for these tests on the individual orders.   Comprehensive metabolic panel   Result Value  Ref Range    Sodium 134 (L) 136 - 145 mmol/L    Potassium 3.7 3.4 - 5.3 mmol/L    Creatinine 0.60 0.51 - 0.95 mg/dL    Urea Nitrogen 6.7 6.0 - 20.0 mg/dL    Chloride 103 98 - 107 mmol/L    Carbon Dioxide (CO2) 23 22 - 29 mmol/L    Anion Gap 8 7 - 15 mmol/L    Glucose 84 70 - 99 mg/dL    Calcium 8.4 (L) 8.6 - 10.0 mg/dL    Protein Total 6.8 6.4 - 8.3 g/dL    Albumin 3.4 (L) 3.5 - 5.2 g/dL    Bilirubin Total 0.2 <=1.2 mg/dL    Alkaline Phosphatase 68 35 - 104 U/L    AST 13 10 - 35 U/L    ALT 9 (L) 10 - 35 U/L    GFR Estimate >90 >60 mL/min/1.73m2   UA with Microscopic reflex to Culture    Specimen: Urine, Clean Catch   Result Value Ref Range    Color Urine Yellow Colorless, Straw, Light Yellow, Yellow    Appearance Urine Slightly Cloudy (A) Clear    Glucose Urine Negative Negative mg/dL    Bilirubin Urine Negative Negative    Ketones Urine Trace (A) Negative mg/dL    Specific Gravity Urine 1.027 1.003 - 1.035    Blood Urine Negative Negative    pH Urine 7.0 5.0 - 7.0    Protein Albumin Urine 20  (A) Negative mg/dL    Urobilinogen Urine Normal Normal, 2.0 mg/dL    Nitrite Urine Positive (A) Negative    Leukocyte Esterase Urine Small (A) Negative    Bacteria Urine Few (A) None Seen /HPF    Mucus Urine Present (A) None Seen /LPF    RBC Urine 6 (H) <=2 /HPF    WBC Urine 16 (H) <=5 /HPF    Squamous Epithelials Urine 2 (H) <=1 /HPF    Narrative    Urine Culture ordered based on laboratory criteria   Blood Culture Arm, Left    Specimen: Arm, Left; Blood   Result Value Ref Range    Culture No growth after 12 hours    Lactic acid whole blood   Result Value Ref Range    Lactic Acid 1.0 0.7 - 2.0 mmol/L   CBC with platelets and differential   Result Value Ref Range    WBC Count 10.3 4.0 - 11.0 10e3/uL    RBC Count 3.40 (L) 3.80 - 5.20 10e6/uL    Hemoglobin 9.1 (L) 11.7 - 15.7 g/dL    Hematocrit 29.7 (L) 35.0 - 47.0 %    MCV 87 78 - 100 fL    MCH 26.8 26.5 - 33.0 pg    MCHC 30.6 (L) 31.5 - 36.5 g/dL    RDW 15.5 (H) 10.0 - 15.0 %     Platelet Count 342 150 - 450 10e3/uL    % Neutrophils 65 %    % Lymphocytes 22 %    % Monocytes 8 %    % Eosinophils 4 %    % Basophils 0 %    % Immature Granulocytes 1 %    NRBCs per 100 WBC 0 <1 /100    Absolute Neutrophils 6.8 1.6 - 8.3 10e3/uL    Absolute Lymphocytes 2.2 0.8 - 5.3 10e3/uL    Absolute Monocytes 0.8 0.0 - 1.3 10e3/uL    Absolute Eosinophils 0.4 0.0 - 0.7 10e3/uL    Absolute Basophils 0.0 0.0 - 0.2 10e3/uL    Absolute Immature Granulocytes 0.1 <=0.4 10e3/uL    Absolute NRBCs 0.0 10e3/uL   CRP inflammation   Result Value Ref Range    CRP Inflammation 18.89 (H) <5.00 mg/L   Blood Culture Arm, Right    Specimen: Arm, Right; Blood   Result Value Ref Range    Culture No growth after 12 hours    CT Foot Left w Contrast    Narrative    FINAL REPORT    I agree with the preliminary report. Dorsal plate and screw fixation with bone graft at the second and third TMT joints including fixation of the proximal 2nd and 3rd metatarsal fractures. Screw fixation of the medial to middle cuneiform. Ununited   fracture of the base of the 4th metatarsal. No hardware fracture or lucency adjacent to the hardware fixation. No acute fractures are evident. Diffuse soft tissue swelling of the dorsum of the foot with scattered areas of bone graft/osseous debris. No   subcutaneous abscess or subcutaneous emphysema. Additional mild diffuse subcutaneous edema in the ankle. No joint effusions. No retracted tendon tear. Normal alignment of the peroneal tendons.    Final Interpretation Dictated By: Max Abrams MD  Date: 8/8/2022       PRELIMINARY EXAM: CT FOOT LEFT W CONTRAST  LOCATION: [facility]  DATE/TIME: 8/7/2022 10:42 PM    INDICATION: Surgery, now signs of infection.  COMPARISON: None.  TECHNIQUE: IV contrast. Axial, sagittal and coronal thin-section reconstruction. Dose reduction techniques were used.   CONTRAST: 100 mL Isovue 370.    PRELIMINARY FINDINGS:   Postsurgical change with plate and  screw fixation across the second and third tarsometatarsal joints. Additional screw extending from the medial cuneiform to the base of the second metatarsal. Uncertain if ill-defined sclerosis at base of second and   third metatarsals is related to healing of prior fractures. There is mineralization within adjacent soft tissues and soft tissue edema along the dorsal lateral midfoot. Infection not excluded.    Please see final interpretation by [facility] in the a.m.    Preliminary Interpretation Dictated By: Soni Elias MD  Date: 8/7/2022    Asymptomatic COVID-19 Virus (Coronavirus) by PCR Nasopharyngeal    Specimen: Nasopharyngeal; Swab   Result Value Ref Range    SARS CoV2 PCR Negative Negative    Narrative    Testing was performed using the Xpert Xpress SARS-CoV-2 Assay on the   [employer]. Additional information about   this Emergency Use Authorization (EUA) assay can be found via the Lab   Guide. This test should be ordered for the detection of SARS-CoV-2 in   individuals who meet SARS-CoV-2 clinical and/or epidemiological   criteria. Test performance is unknown in asymptomatic patients. This   test is for in vitro diagnostic use under the FDA EUA for   laboratories certified under CLIA to perform high complexity testing.   This test has not been FDA cleared or approved. A negative result   does not rule out the presence of PCR inhibitors in the specimen or   target RNA in concentration below the limit of detection for the   assay. The possibility of a false negative should be considered if   the patient's recent exposure or clinical presentation suggests   COVID-19. This test was validated by the [facility]. This laboratory is certified under the Clinical Laboratory Improvement Amendments of 1988 (CLIA-88) as qualified to perform high complexity laboratory testing.     CBC with platelets   Result Value Ref Range    WBC Count 7.9 4.0 - 11.0 10e3/uL     RBC Count 3.30 (L) 3.80 - 5.20 10e6/uL    Hemoglobin 8.8 (L) 11.7 - 15.7 g/dL    Hematocrit 29.0 (L) 35.0 - 47.0 %    MCV 88 78 - 100 fL    MCH 26.7 26.5 - 33.0 pg    MCHC 30.3 (L) 31.5 - 36.5 g/dL    RDW 15.4 (H) 10.0 - 15.0 %    Platelet Count 315 150 - 450 10e3/uL   Basic metabolic panel   Result Value Ref Range    Creatinine 0.58 0.51 - 0.95 mg/dL    Sodium 136 136 - 145 mmol/L    Potassium 4.0 3.4 - 5.3 mmol/L    Urea Nitrogen 5.5 (L) 6.0 - 20.0 mg/dL    Chloride 108 (H) 98 - 107 mmol/L    Carbon Dioxide (CO2) 21 (L) 22 - 29 mmol/L    Anion Gap 7 7 - 15 mmol/L    Glucose 88 70 - 99 mg/dL    GFR Estimate >90 >60 mL/min/1.73m2    Calcium 7.9 (L) 8.6 - 10.0 mg/dL        ED Room 6

## 2023-07-24 ENCOUNTER — TRANSFERRED RECORDS (OUTPATIENT)
Dept: HEALTH INFORMATION MANAGEMENT | Facility: CLINIC | Age: 43
End: 2023-07-24

## 2023-09-13 ENCOUNTER — ANESTHESIA EVENT (OUTPATIENT)
Dept: SURGERY | Facility: CLINIC | Age: 43
End: 2023-09-13
Payer: COMMERCIAL

## 2023-09-13 RX ORDER — ONDANSETRON 8 MG/1
8 TABLET, FILM COATED ORAL DAILY
COMMUNITY

## 2023-09-13 RX ORDER — ACETAMINOPHEN 500 MG
500-1000 TABLET ORAL EVERY 6 HOURS PRN
COMMUNITY

## 2023-09-13 RX ORDER — LABETALOL 200 MG/1
200 TABLET, FILM COATED ORAL 2 TIMES DAILY
COMMUNITY

## 2023-09-13 ASSESSMENT — LIFESTYLE VARIABLES: TOBACCO_USE: 0

## 2023-09-13 NOTE — PROGRESS NOTES
PTA medications updated by Medication Scribe prior to surgery via phone call with patient (last doses completed by Nurse)     Medication history sources: Patient, Surescripts, and H&P  In the past week, patient estimated taking medication this percent of the time: Greater than 90%      Significant changes made to the medication list:  Patient reports no longer taking the following meds (med scribe removed from PTA med list): Oxycodone, Senokot      Additional medication history information:   Patient was advised to bring: Albuterol HFA    Medication reconciliation completed by provider prior to medication history? No    Time spent in this activity: 35 minutes    The information provided in this note is only as accurate as the sources available at the time of update(s)      Prior to Admission medications    Medication Sig Last Dose Taking? Auth Provider Long Term End Date   acetaminophen (TYLENOL) 500 MG tablet Take 500-1,000 mg by mouth every 6 hours as needed for mild pain Unknown at PRN Yes Reported, Patient     albuterol (ALBUTEROL) 108 (90 BASE) MCG/ACT inhaler Inhale 1-2 puffs into the lungs every 4 hours as needed for shortness of breath / dyspnea Unknown at PRN Yes Juliana Reilly PA-C Yes    Aspirin-Acetaminophen-Caffeine (EXCEDRIN PO) Take 2 tablets by mouth daily as needed (Headaches) Unknown at PRN Yes Reported, Patient     labetalol (NORMODYNE) 200 MG tablet Take 200 mg by mouth 2 times daily  at AM Yes Reported, Patient     ondansetron (ZOFRAN) 8 MG tablet Take 8 mg by mouth daily 9/13/2023 at PM Yes Reported, Patient     PRENATAL VIT-DOCUSATE-IRON-FA PO Take 1 tablet by mouth daily Has not started Yes Reported, Patient

## 2023-09-13 NOTE — ANESTHESIA PREPROCEDURE EVALUATION
Anesthesia Pre-Procedure Evaluation    Patient: Simran Fish   MRN: [id number] : 1980        Procedure : Procedure(s):  LEFT REVISION 2ND TO 3RD TARSOMETATARSAL ARTHRODESIS, CALCANEAL AUTOGRAFT HARVEST  2ND TO 3RD TARSOMETATARSAL AND LISFRANC HARDWARE REMOVAL          Past Medical History:   Diagnosis Date    Abnormal Pap smear     Anxiety     ASCUS on Pap smear 7/16/10,  10/19/11    neg HPV,  Pos HPV    Asthma     Cervical high risk HPV (human papillomavirus) test positive     heart murmur     Heart murmur     hsv 2015    HSV-2 infection 2015    HTN (hypertension)     Migraines     Post partum depression     Varicella       Past Surgical History:   Procedure Laterality Date    ARTHRODESIS FOOT Left 2022    Procedure: 1.  Open reduction internal fixation of left Lisfranc injury 2.  Left second tarsometatarsal joint arthrodesis 3.  Left third tarsometatarsal joint arthrodesis;  Surgeon: Aramis Chacon MD;  Location: [facility] OR    HERNIORRHAPHY UMBILICAL  2014    Procedure: HERNIORRHAPHY UMBILICAL;  Surgeon: Luz Kinney MD;  Location: [facility] OR    IRRIGATION AND DEBRIDEMENT LOWER EXTREMITY, COMBINED Left 2022    Procedure: Left foot wound debridement, delayed secondary closure;  Surgeon: Nasim Trejo MD;  Location: [facility] OR    ORTHOPEDIC SURGERY Right     ORIF hand    wisdom teeth extraction      ZZC NONSPECIFIC PROCEDURE  2008    wisdom teeth      Allergies   Allergen Reactions    Asa [Aspirin] Hives    Morphine And Related Hives      Social History     Tobacco Use    Smoking status: Never    Smokeless tobacco: Never   Substance Use Topics    Alcohol use: No      Wt Readings from Last 1 Encounters:   22 89.6 kg (197 lb 9.6 oz)        Anesthesia Evaluation            ROS/MED HX  ENT/Pulmonary:     (+)                     asthma               (-) tobacco use and sleep apnea   Neurologic:     (+)      migraines,                          Cardiovascular:      (+)  hypertension- -   -  - -                                      METS/Exercise Tolerance:     Hematologic:       Musculoskeletal:       GI/Hepatic:    (-) GERD   Renal/Genitourinary:       Endo:       Psychiatric/Substance Use:     (+) psychiatric history anxiety       Infectious Disease:       Malignancy:       Other:               OUTSIDE LABS:  CBC:   Lab Results   Component Value Date    WBC 12.6 (H) 09/06/2022    WBC 12.0 (H) 09/01/2022    HGB 8.6 (L) 09/06/2022    HGB 8.4 (L) 09/01/2022    HCT 26.5 (L) 09/06/2022    HCT 26.8 (L) 09/01/2022     09/06/2022     09/01/2022     BMP:   Lab Results   Component Value Date     08/12/2022     08/11/2022    POTASSIUM 4.1 08/12/2022    POTASSIUM 4.2 08/11/2022    CHLORIDE 107 08/12/2022    CHLORIDE 105 08/11/2022    CO2 23 08/12/2022    CO2 24 08/11/2022    BUN 10 09/06/2022    BUN 6 (L) 09/01/2022    CR 0.81 09/06/2022    CR 0.86 08/23/2022    GLC 79 08/12/2022    GLC 82 08/11/2022     COAGS: No results found for: PTT, INR, FIBR  POC:   Lab Results   Component Value Date    HCG Negative 10/29/2017    HCGS Negative 05/05/2014     HEPATIC:   Lab Results   Component Value Date    ALBUMIN 3.4 (L) 08/07/2022    PROTTOTAL 6.8 08/07/2022    ALT 9 (L) 08/07/2022    AST 10 09/06/2022    ALKPHOS 68 08/07/2022    BILITOTAL 0.2 08/07/2022     OTHER:   Lab Results   Component Value Date    LACT 1.0 08/07/2022    СВЕТЛАНА 8.6 08/12/2022    LIPASE 191 05/05/2014    TSH 1.05 04/28/2015    CRP 8.8 (H) 09/06/2022    SED 67 (H) 09/06/2022       Anesthesia Plan    ASA Status:  2       Anesthesia Type: General.     - Airway: LMA   Induction: Intravenous, Propofol.   Maintenance: Balanced.        Consents            Postoperative Care    Pain management: Multi-modal analgesia.   PONV prophylaxis: Ondansetron (or other 5HT-3), Dexamethasone or Solumedrol, Background Propofol Infusion     Comments:                Karl Michel MD

## 2023-09-14 ENCOUNTER — HOSPITAL ENCOUNTER (OUTPATIENT)
Facility: CLINIC | Age: 43
Discharge: HOME | End: 2023-09-14
Attending: ORTHOPAEDIC SURGERY | Admitting: ORTHOPAEDIC SURGERY
Payer: COMMERCIAL

## 2023-09-14 ENCOUNTER — ANESTHESIA (OUTPATIENT)
Dept: SURGERY | Facility: CLINIC | Age: 43
End: 2023-09-14
Payer: COMMERCIAL

## 2023-09-14 LAB — HCG UR QL: POSITIVE

## 2023-09-14 PROCEDURE — 272N000001 HC OR GENERAL SUPPLY STERILE: Performed by: ORTHOPAEDIC SURGERY

## 2023-09-14 PROCEDURE — 999N000141 HC STATISTIC PRE-PROCEDURE NURSING ASSESSMENT: Performed by: ORTHOPAEDIC SURGERY

## 2023-09-14 PROCEDURE — 81025 URINE PREGNANCY TEST: CPT | Performed by: STUDENT IN AN ORGANIZED HEALTH CARE EDUCATION/TRAINING PROGRAM

## 2023-09-14 RX ORDER — HYDROMORPHONE HCL IN WATER/PF 6 MG/30 ML
0.4 PATIENT CONTROLLED ANALGESIA SYRINGE INTRAVENOUS EVERY 5 MIN PRN
Status: CANCELLED | OUTPATIENT
Start: 2023-09-14

## 2023-09-14 RX ORDER — CEFAZOLIN SODIUM/WATER 2 G/20 ML
2 SYRINGE (ML) INTRAVENOUS
Status: DISCONTINUED | OUTPATIENT
Start: 2023-09-14 | End: 2023-09-14 | Stop reason: HOSPADM

## 2023-09-14 RX ORDER — FENTANYL CITRATE 0.05 MG/ML
50 INJECTION, SOLUTION INTRAMUSCULAR; INTRAVENOUS
Status: DISCONTINUED | OUTPATIENT
Start: 2023-09-14 | End: 2023-09-14 | Stop reason: HOSPADM

## 2023-09-14 RX ORDER — ONDANSETRON 2 MG/ML
4 INJECTION INTRAMUSCULAR; INTRAVENOUS EVERY 30 MIN PRN
Status: CANCELLED | OUTPATIENT
Start: 2023-09-14

## 2023-09-14 RX ORDER — FENTANYL CITRATE 0.05 MG/ML
50 INJECTION, SOLUTION INTRAMUSCULAR; INTRAVENOUS EVERY 5 MIN PRN
Status: CANCELLED | OUTPATIENT
Start: 2023-09-14

## 2023-09-14 RX ORDER — CEFAZOLIN SODIUM/WATER 2 G/20 ML
2 SYRINGE (ML) INTRAVENOUS SEE ADMIN INSTRUCTIONS
Status: DISCONTINUED | OUTPATIENT
Start: 2023-09-14 | End: 2023-09-14 | Stop reason: HOSPADM

## 2023-09-14 RX ORDER — LIDOCAINE HYDROCHLORIDE 10 MG/ML
INJECTION, SOLUTION EPIDURAL; INFILTRATION; INTRACAUDAL; PERINEURAL
Status: DISCONTINUED
Start: 2023-09-14 | End: 2023-09-14 | Stop reason: HOSPADM

## 2023-09-14 RX ORDER — SODIUM CHLORIDE, SODIUM LACTATE, POTASSIUM CHLORIDE, CALCIUM CHLORIDE 600; 310; 30; 20 MG/100ML; MG/100ML; MG/100ML; MG/100ML
INJECTION, SOLUTION INTRAVENOUS CONTINUOUS
Status: DISCONTINUED | OUTPATIENT
Start: 2023-09-14 | End: 2023-09-14 | Stop reason: HOSPADM

## 2023-09-14 RX ORDER — SODIUM CHLORIDE, SODIUM LACTATE, POTASSIUM CHLORIDE, CALCIUM CHLORIDE 600; 310; 30; 20 MG/100ML; MG/100ML; MG/100ML; MG/100ML
INJECTION, SOLUTION INTRAVENOUS CONTINUOUS
Status: CANCELLED | OUTPATIENT
Start: 2023-09-14

## 2023-09-14 RX ORDER — ONDANSETRON 4 MG/1
4 TABLET, ORALLY DISINTEGRATING ORAL EVERY 30 MIN PRN
Status: CANCELLED | OUTPATIENT
Start: 2023-09-14

## 2023-09-14 RX ORDER — FENTANYL CITRATE 0.05 MG/ML
25 INJECTION, SOLUTION INTRAMUSCULAR; INTRAVENOUS EVERY 5 MIN PRN
Status: CANCELLED | OUTPATIENT
Start: 2023-09-14

## 2023-09-14 RX ORDER — HYDROMORPHONE HCL IN WATER/PF 6 MG/30 ML
0.2 PATIENT CONTROLLED ANALGESIA SYRINGE INTRAVENOUS EVERY 5 MIN PRN
Status: CANCELLED | OUTPATIENT
Start: 2023-09-14

## 2023-09-14 RX ORDER — LIDOCAINE 40 MG/G
CREAM TOPICAL
Status: DISCONTINUED | OUTPATIENT
Start: 2023-09-14 | End: 2023-09-14 | Stop reason: HOSPADM

## 2023-09-14 RX ORDER — BUPIVACAINE HYDROCHLORIDE 5 MG/ML
INJECTION, SOLUTION EPIDURAL; INTRACAUDAL
Status: DISCONTINUED
Start: 2023-09-14 | End: 2023-09-14 | Stop reason: HOSPADM

## 2023-09-14 ASSESSMENT — ACTIVITIES OF DAILY LIVING (ADL): ADLS_ACUITY_SCORE: 35

## 2023-09-14 NOTE — PROGRESS NOTES
PT surgery cancelled due to positive pregnancy. Pt was aware she was pregnant prior to arrival. Dr Anand SMITH spoke with OB and confirmed cancelled.. Dr Flores informed. Pt dressed and off unit.

## 2024-09-08 ENCOUNTER — HOSPITAL ENCOUNTER (EMERGENCY)
Facility: CLINIC | Age: 44
Discharge: HOME | End: 2024-09-08
Attending: EMERGENCY MEDICINE | Admitting: EMERGENCY MEDICINE
Payer: COMMERCIAL

## 2024-09-08 VITALS
DIASTOLIC BLOOD PRESSURE: 85 MMHG | OXYGEN SATURATION: 98 % | BODY MASS INDEX: 32.1 KG/M2 | SYSTOLIC BLOOD PRESSURE: 153 MMHG | HEART RATE: 76 BPM | HEIGHT: 64 IN | TEMPERATURE: 97.5 F | RESPIRATION RATE: 18 BRPM | WEIGHT: 188 LBS

## 2024-09-08 DIAGNOSIS — R51.9 ACUTE INTRACTABLE HEADACHE, UNSPECIFIED HEADACHE TYPE: ICD-10-CM

## 2024-09-08 DIAGNOSIS — R11.0 NAUSEA: ICD-10-CM

## 2024-09-08 LAB
HOLD SPECIMEN: NORMAL

## 2024-09-08 PROCEDURE — 258N000003 HC RX IP 258 OP 636: Performed by: EMERGENCY MEDICINE

## 2024-09-08 PROCEDURE — 93005 ELECTROCARDIOGRAM TRACING: CPT

## 2024-09-08 PROCEDURE — 96374 THER/PROPH/DIAG INJ IV PUSH: CPT

## 2024-09-08 PROCEDURE — 250N000013 HC RX MED GY IP 250 OP 250 PS 637: Performed by: EMERGENCY MEDICINE

## 2024-09-08 PROCEDURE — 99284 EMERGENCY DEPT VISIT MOD MDM: CPT | Mod: 25

## 2024-09-08 PROCEDURE — 250N000011 HC RX IP 250 OP 636: Performed by: EMERGENCY MEDICINE

## 2024-09-08 PROCEDURE — 96375 TX/PRO/DX INJ NEW DRUG ADDON: CPT

## 2024-09-08 PROCEDURE — 96361 HYDRATE IV INFUSION ADD-ON: CPT

## 2024-09-08 RX ORDER — DIPHENHYDRAMINE HYDROCHLORIDE 50 MG/ML
12.5 INJECTION INTRAMUSCULAR; INTRAVENOUS ONCE
Status: COMPLETED | OUTPATIENT
Start: 2024-09-08 | End: 2024-09-08

## 2024-09-08 RX ORDER — METOCLOPRAMIDE HYDROCHLORIDE 5 MG/ML
10 INJECTION INTRAMUSCULAR; INTRAVENOUS ONCE
Status: COMPLETED | OUTPATIENT
Start: 2024-09-08 | End: 2024-09-08

## 2024-09-08 RX ORDER — ACETAMINOPHEN 500 MG
1000 TABLET ORAL ONCE
Status: COMPLETED | OUTPATIENT
Start: 2024-09-08 | End: 2024-09-08

## 2024-09-08 RX ORDER — KETOROLAC TROMETHAMINE 15 MG/ML
15 INJECTION, SOLUTION INTRAMUSCULAR; INTRAVENOUS ONCE
Status: COMPLETED | OUTPATIENT
Start: 2024-09-08 | End: 2024-09-08

## 2024-09-08 RX ORDER — ONDANSETRON 4 MG/1
4 TABLET, ORALLY DISINTEGRATING ORAL EVERY 8 HOURS PRN
Qty: 10 TABLET | Refills: 0 | Status: SHIPPED | OUTPATIENT
Start: 2024-09-08 | End: 2024-09-11

## 2024-09-08 RX ADMIN — DIPHENHYDRAMINE HYDROCHLORIDE 12.5 MG: 50 INJECTION, SOLUTION INTRAMUSCULAR; INTRAVENOUS at 17:06

## 2024-09-08 RX ADMIN — SODIUM CHLORIDE 1000 ML: 9 INJECTION, SOLUTION INTRAVENOUS at 17:05

## 2024-09-08 RX ADMIN — KETOROLAC TROMETHAMINE 15 MG: 15 INJECTION, SOLUTION INTRAMUSCULAR; INTRAVENOUS at 17:06

## 2024-09-08 RX ADMIN — METOCLOPRAMIDE 10 MG: 5 INJECTION, SOLUTION INTRAMUSCULAR; INTRAVENOUS at 17:06

## 2024-09-08 RX ADMIN — ACETAMINOPHEN 1000 MG: 500 TABLET, FILM COATED ORAL at 17:06

## 2024-09-08 ASSESSMENT — COLUMBIA-SUICIDE SEVERITY RATING SCALE - C-SSRS
2. HAVE YOU ACTUALLY HAD ANY THOUGHTS OF KILLING YOURSELF IN THE PAST MONTH?: NO
1. IN THE PAST MONTH, HAVE YOU WISHED YOU WERE DEAD OR WISHED YOU COULD GO TO SLEEP AND NOT WAKE UP?: NO
6. HAVE YOU EVER DONE ANYTHING, STARTED TO DO ANYTHING, OR PREPARED TO DO ANYTHING TO END YOUR LIFE?: NO

## 2024-09-08 ASSESSMENT — ACTIVITIES OF DAILY LIVING (ADL)
ADLS_ACUITY_SCORE: 38

## 2024-09-08 NOTE — ED TRIAGE NOTES
BIBA from home with c/o migraine and n/v that started this morning. Has hx of migraine headaches but does not usually have associated n/v. SBP in route 170s, all other VSS. 4mg zofran given in route. Tylenol 0900 without relief of symptoms.  BP in triage 182/110. 5 months postpartum

## 2024-09-08 NOTE — ED PROVIDER NOTES
Emergency Department Note      History of Present Illness     Chief Complaint   Headache and Nausea & Vomiting      HPI   Simran Fish is a 44 year old female presenting with headache, nausea and vomiting.  She reports she went out for drinks with friends last night, but only had 3 alcoholic beverages.  She states that she is not feeling well today is unable to keep anything down.  She has a history of migraines, but it usually improves after taking Excedrin.  She took Tylenol this morning and it did not improve her symptoms.  She is 5 months postpartum, and is not breast-feeding.  No numbness/tingling or weakness in the extremities.    Independent Historian   None    Review of External Notes   4/10/24: Clinic note reviewed    Past Medical History     Medical History and Problem List   Past Medical History:   Diagnosis Date    Abnormal Pap smear     Anxiety     ASCUS on Pap smear 7/16/10,  10/19/11    Asthma     Cervical high risk HPV (human papillomavirus) test positive     heart murmur     Heart murmur     hsv 04/23/2015    HSV-2 infection 04/23/2015    HTN (hypertension)     Migraines     Post partum depression     Varicella        Medications   ondansetron (ZOFRAN ODT) 4 MG ODT tab  acetaminophen (TYLENOL) 500 MG tablet  albuterol (ALBUTEROL) 108 (90 BASE) MCG/ACT inhaler  Aspirin-Acetaminophen-Caffeine (EXCEDRIN PO)  labetalol (NORMODYNE) 200 MG tablet  ondansetron (ZOFRAN) 8 MG tablet  PRENATAL VIT-DOCUSATE-IRON-FA PO        Surgical History   Past Surgical History:   Procedure Laterality Date    ARTHRODESIS FOOT Left 07/13/2022    Procedure: 1.  Open reduction internal fixation of left Lisfranc injury 2.  Left second tarsometatarsal joint arthrodesis 3.  Left third tarsometatarsal joint arthrodesis;  Surgeon: Aramis Chacon MD;  Location: [facility] OR    HERNIORRHAPHY UMBILICAL  04/23/2014    Procedure: HERNIORRHAPHY UMBILICAL;  Surgeon: Luz Kinney MD;  Location:  OR    IRRIGATION AND  "DEBRIDEMENT LOWER EXTREMITY, COMBINED Left 08/08/2022    Procedure: Left foot wound debridement, delayed secondary closure;  Surgeon: Nasim Trejo MD;  Location: [facility] OR    ORTHOPEDIC SURGERY Right 2019    [facility] hand    wisdom teeth extraction      [facility] NONSPECIFIC PROCEDURE  03/01/2008    wisdom teeth       Physical Exam     Patient Vitals for the past 24 hrs:   BP Temp Temp src Pulse Resp SpO2 Height Weight   09/08/24 1910 (!) 153/85 -- -- 76 -- 98 % -- --   09/08/24 1900 -- -- -- -- -- 100 % -- --   09/08/24 1715 -- -- -- -- -- 98 % -- --   09/08/24 1700 -- -- -- -- -- 99 % -- --   09/08/24 1645 (!) 188/112 -- -- 69 -- 99 % -- --   09/08/24 1644 -- -- -- -- -- 98 % -- --   09/08/24 1643 (!) 188/112 -- -- 69 -- -- -- --   09/08/24 1629 (!) 182/110 97.5  F (36.4  C) Temporal 75 18 100 % 1.626 m (5' 4\") 85.3 kg (188 lb)     Physical Exam  General: No acute distress  Head: No obvious trauma to head.  Ears, Nose, Throat:  External ears normal.  Nose normal.  No pharyngeal erythema, swelling or exudate.  Midline uvula. Moist mucus membranes.  Eyes:  Conjunctivae clear. EOMI. PERRL. No nystagmus  Neck: Normal range of motion.  Neck supple.   CV: Regular rate and rhythm.  No murmurs.      Respiratory: Effort normal and breath sounds normal.  No wheezing or crackles.   Gastrointestinal: Soft.  No distension. There is no tenderness.  There is no rigidity, no rebound and no guarding.   Musculoskeletal: Normal range of motion.  Non tender extremities to palpations. No lower extremity edema  Neuro: Alert. Moving all extremities appropriately.  Normal speech. CN II-XII grossly intact, no pronator drift, normal finger-nose-finger, visual fields intact.  Gross muscle strength intact of the proximal and distal bilateral upper and lower extremities.  Sensation intact to light touch in all 4 extremities.   Skin: Skin is warm and dry.  No rash noted.   Psych: Normal mood and affect. Behavior is normal.       Diagnostics     Lab " Results   Labs Ordered and Resulted from Time of ED Arrival to Time of ED Departure - No data to display    Imaging   No orders to display       EKG   ECG results from 09/08/24   EKG 12-lead, tracing only     Value    Systolic Blood Pressure     Diastolic Blood Pressure     Ventricular Rate 87    Atrial Rate 87    MI Interval 152    QRS Duration 92        QTc 462    P Axis 50    R AXIS 28    T Axis 38    Interpretation ECG      Sinus rhythm  When compared with ECG of 13-Jul-2022 18:54,  No significant change was found           Independent Interpretation   None    ED Course      Medications Administered   Medications   sodium chloride 0.9% BOLUS 1,000 mL (0 mLs Intravenous Stopped 9/8/24 1902)   acetaminophen (TYLENOL) tablet 1,000 mg (1,000 mg Oral $Given 9/8/24 1706)   ketorolac (TORADOL) injection 15 mg (15 mg Intravenous $Given 9/8/24 1706)   diphenhydrAMINE (BENADRYL) injection 12.5 mg (12.5 mg Intravenous $Given 9/8/24 1706)   metoclopramide (REGLAN) injection 10 mg (10 mg Intravenous $Given 9/8/24 1706)       Procedures   Procedures     Discussion of Management   None    ED Course   ED Course as of 09/09/24 0008   Sun Sep 08, 2024   1637 Initial evaluation and assessment of patient   1859 I reevaluated the patient       Additional Documentation  None    Medical Decision Making / Diagnosis     CMS Diagnoses: None    MIPS       None    MDM   Simran Fish is a 44 year old female presenting with a headache.  She also endorses nausea.  She has no focal neurodeficits.  No indication to perform any advanced imaging of the brain.  She is given IV fluids, Tylenol, Toradol, IV Benadryl and Reglan.  Upon reevaluation, she reports she is feeling better.  Nausea has resolved.  She is medically cleared to discharge home and feels comfortable going home at this time.  She is encouraged to drink plenty of fluids to stay hydrated and get rest.  Return precautions are given and she verbalizes understanding.  She is  discharged home in stable condition.    Disposition   The patient was discharged.     Diagnosis     ICD-10-CM    1. Acute intractable headache, unspecified headache type  R51.9       2. Nausea  R11.0            Discharge Medications   Discharge Medication List as of 9/8/2024  7:07 PM        START taking these medications    Details   ondansetron (ZOFRAN ODT) 4 MG ODT tab Take 1 tablet (4 mg) by mouth every 8 hours as needed for nausea., Disp-10 tablet, R-0, Local Print               MD Moreno Garcia Stephen, MD  09/09/24 0008

## 2024-09-09 LAB
ATRIAL RATE - MUSE: 87 BPM
DIASTOLIC BLOOD PRESSURE - MUSE: NORMAL MMHG
INTERPRETATION ECG - MUSE: NORMAL
P AXIS - MUSE: 50 DEGREES
PR INTERVAL - MUSE: 152 MS
QRS DURATION - MUSE: 92 MS
QT - MUSE: 384 MS
QTC - MUSE: 462 MS
R AXIS - MUSE: 28 DEGREES
SYSTOLIC BLOOD PRESSURE - MUSE: NORMAL MMHG
T AXIS - MUSE: 38 DEGREES
VENTRICULAR RATE- MUSE: 87 BPM

## (undated) DEVICE — CAST PADDING 2" UNSTERILE 9062

## (undated) DEVICE — Device

## (undated) DEVICE — GOWN IMPERVIOUS SPECIALTY XLG/XLONG 32474

## (undated) DEVICE — CAST PADDING 4" UNSTERILE 9044

## (undated) DEVICE — SU VICRYL 0 CT-1 27" J340H

## (undated) DEVICE — GLOVE PROTEXIS W/NEU-THERA 6.5  2D73TE65

## (undated) DEVICE — DRAPE IOBAN INCISE 23X17" 6650EZ

## (undated) DEVICE — SOL WATER IRRIG 1000ML BOTTLE 2F7114

## (undated) DEVICE — BNDG ELASTIC 4" DBL LENGTH UNSTERILE 6611-14

## (undated) DEVICE — PREP POVIDONE-IODINE 7.5% SCRUB 4OZ BOTTLE MDS093945

## (undated) DEVICE — PACK LOWER EXTREMITY RIDGES

## (undated) DEVICE — CAST PADDING 4" STERILE 9044S

## (undated) DEVICE — LINEN TOWEL PACK X5 5464

## (undated) DEVICE — SU ETHILON 3-0 FS-1 18" 669H

## (undated) DEVICE — LINEN FULL SHEET 5511

## (undated) DEVICE — SU ETHILON 3-0 PS-2 18" 1669H

## (undated) DEVICE — SYR BULB IRRIG DOVER 60 ML LATEX FREE 67000

## (undated) DEVICE — BNDG ELASTIC 4"X5YDS UNSTERILE 6611-40

## (undated) DEVICE — BLADE KNIFE SURG 15 371115

## (undated) DEVICE — PACK EXTREMITY SOP15EXFSD

## (undated) DEVICE — LINEN ORTHO ACL PACK 5447

## (undated) DEVICE — PREP CHLORAPREP 26ML TINTED HI-LITE ORANGE 930815

## (undated) DEVICE — BAG CLEAR TRASH 1.3M 39X33" P4040C

## (undated) DEVICE — ESU GROUND PAD ADULT W/CORD E7507

## (undated) DEVICE — SYR BULB IRRIG 50ML LATEX FREE 0035280

## (undated) DEVICE — SUTURE MONOCRYL+ 3-0 PS-1 27" UNDYED MCP936H

## (undated) DEVICE — DRSG ABDOMINAL 07 1/2X8" 7197D

## (undated) DEVICE — DRAPE MINI C-ARM 4003

## (undated) DEVICE — TUBING SUCTION 12"X1/4" N612

## (undated) DEVICE — APPLICATORS COTTON TIP 6"X2 STERILE LF C15053-006

## (undated) DEVICE — TUBE CULTURE AEROBIC/ANAEROBIC W/O SWABS A.C.T.I.1. 12401

## (undated) DEVICE — GLOVE PROTEXIS BLUE W/NEU-THERA 7.5  2D73EB75

## (undated) DEVICE — PREP SKIN SCRUB TRAY 4461A

## (undated) DEVICE — DRAPE X-RAY TUBE 00-901169-01-OEC

## (undated) DEVICE — GOWN REINFORCED XXLG 9071

## (undated) DEVICE — LINEN DRAPE 54X72" 5467

## (undated) DEVICE — ESU PENCIL W/HOLSTER E2350H

## (undated) DEVICE — KIT CULTURE ESWAB AEROBE/ANAEROBE WHITE TOP R723480

## (undated) DEVICE — DECANTER BAG 2002S

## (undated) DEVICE — DRSG XEROFORM 5X9" 8884431605

## (undated) DEVICE — DRSG KERLIX FLUFFS X5

## (undated) DEVICE — SU MONOCRYL 2-0 CT-1 36" UND Y945H

## (undated) DEVICE — BLADE CLIPPER 4406

## (undated) DEVICE — PREP POVIDONE IODINE SOLUTION 10% 4OZ BOTTLE 29906-004

## (undated) DEVICE — GLOVE PROTEXIS BLUE W/NEU-THERA 6.5  2D73EB65

## (undated) DEVICE — DRAPE C-ARM 60X42" 1013

## (undated) DEVICE — GLOVE PROTEXIS MICRO 8.5  2D73PM85

## (undated) DEVICE — LINEN HALF SHEET 5512

## (undated) DEVICE — SOL NACL 0.9% IRRIG 3000ML BAG 2B7477

## (undated) DEVICE — GLOVE PROTEXIS W/NEU-THERA 7.5  2D73TE75

## (undated) RX ORDER — DEXAMETHASONE SODIUM PHOSPHATE 4 MG/ML
INJECTION, SOLUTION INTRA-ARTICULAR; INTRALESIONAL; INTRAMUSCULAR; INTRAVENOUS; SOFT TISSUE
Status: DISPENSED
Start: 2023-09-14

## (undated) RX ORDER — FENTANYL CITRATE 50 UG/ML
INJECTION, SOLUTION INTRAMUSCULAR; INTRAVENOUS
Status: DISPENSED
Start: 2022-07-13

## (undated) RX ORDER — FENTANYL CITRATE 50 UG/ML
INJECTION, SOLUTION INTRAMUSCULAR; INTRAVENOUS
Status: DISPENSED
Start: 2022-08-08

## (undated) RX ORDER — GINSENG 100 MG
CAPSULE ORAL
Status: DISPENSED
Start: 2022-07-13

## (undated) RX ORDER — CEFAZOLIN SODIUM/WATER 2 G/20 ML
SYRINGE (ML) INTRAVENOUS
Status: DISPENSED
Start: 2022-07-13

## (undated) RX ORDER — OXYCODONE HYDROCHLORIDE 5 MG/1
TABLET ORAL
Status: DISPENSED
Start: 2022-08-08

## (undated) RX ORDER — ONDANSETRON 2 MG/ML
INJECTION INTRAMUSCULAR; INTRAVENOUS
Status: DISPENSED
Start: 2023-09-14

## (undated) RX ORDER — PROPOFOL 10 MG/ML
INJECTION, EMULSION INTRAVENOUS
Status: DISPENSED
Start: 2023-09-14

## (undated) RX ORDER — FENTANYL CITRATE 50 UG/ML
INJECTION, SOLUTION INTRAMUSCULAR; INTRAVENOUS
Status: DISPENSED
Start: 2023-09-14

## (undated) RX ORDER — FENTANYL CITRATE-0.9 % NACL/PF 10 MCG/ML
PLASTIC BAG, INJECTION (ML) INTRAVENOUS
Status: DISPENSED
Start: 2022-07-13

## (undated) RX ORDER — OXYCODONE HYDROCHLORIDE 5 MG/1
TABLET ORAL
Status: DISPENSED
Start: 2022-07-13

## (undated) RX ORDER — BUPIVACAINE HYDROCHLORIDE 5 MG/ML
INJECTION, SOLUTION EPIDURAL; INTRACAUDAL
Status: DISPENSED
Start: 2022-07-13

## (undated) RX ORDER — ACETAMINOPHEN 325 MG/1
TABLET ORAL
Status: DISPENSED
Start: 2022-08-08